# Patient Record
Sex: MALE | Race: WHITE | NOT HISPANIC OR LATINO | Employment: OTHER | URBAN - METROPOLITAN AREA
[De-identification: names, ages, dates, MRNs, and addresses within clinical notes are randomized per-mention and may not be internally consistent; named-entity substitution may affect disease eponyms.]

---

## 2022-01-15 ENCOUNTER — APPOINTMENT (OUTPATIENT)
Dept: CT IMAGING | Facility: HOSPITAL | Age: 67
End: 2022-01-15

## 2022-01-15 ENCOUNTER — HOSPITAL ENCOUNTER (EMERGENCY)
Facility: HOSPITAL | Age: 67
Discharge: HOME | End: 2022-01-16
Attending: EMERGENCY MEDICINE | Admitting: EMERGENCY MEDICINE

## 2022-01-15 DIAGNOSIS — R73.9 HYPERGLYCEMIA: ICD-10-CM

## 2022-01-15 DIAGNOSIS — R10.33 PERIUMBILICAL ABDOMINAL PAIN: Primary | ICD-10-CM

## 2022-01-15 LAB
ACETONE BLD QL: NEGATIVE
ALBUMIN SERPL-MCNC: 4.1 G/DL (ref 3.5–5.2)
ALBUMIN/GLOB SERPL: 0.9 G/DL
ALP SERPL-CCNC: 133 U/L (ref 39–117)
ALT SERPL W P-5'-P-CCNC: 49 U/L (ref 1–41)
ANION GAP SERPL CALCULATED.3IONS-SCNC: 13.9 MMOL/L (ref 5–15)
AST SERPL-CCNC: 62 U/L (ref 1–40)
BASOPHILS # BLD AUTO: 0.05 10*3/MM3 (ref 0–0.2)
BASOPHILS NFR BLD AUTO: 0.5 % (ref 0–1.5)
BILIRUB SERPL-MCNC: 0.6 MG/DL (ref 0–1.2)
BILIRUB UR QL STRIP: NEGATIVE
BUN SERPL-MCNC: 27 MG/DL (ref 8–23)
BUN/CREAT SERPL: 18.9 (ref 7–25)
CALCIUM SPEC-SCNC: 9.6 MG/DL (ref 8.6–10.5)
CHLORIDE SERPL-SCNC: 94 MMOL/L (ref 98–107)
CLARITY UR: CLEAR
CO2 SERPL-SCNC: 28.1 MMOL/L (ref 22–29)
COLOR UR: YELLOW
CREAT SERPL-MCNC: 1.43 MG/DL (ref 0.76–1.27)
DEPRECATED RDW RBC AUTO: 44.5 FL (ref 37–54)
EOSINOPHIL # BLD AUTO: 0.24 10*3/MM3 (ref 0–0.4)
EOSINOPHIL NFR BLD AUTO: 2.6 % (ref 0.3–6.2)
ERYTHROCYTE [DISTWIDTH] IN BLOOD BY AUTOMATED COUNT: 13.2 % (ref 12.3–15.4)
GFR SERPL CREATININE-BSD FRML MDRD: 50 ML/MIN/1.73
GLOBULIN UR ELPH-MCNC: 4.6 GM/DL
GLUCOSE BLDC GLUCOMTR-MCNC: 340 MG/DL (ref 70–99)
GLUCOSE SERPL-MCNC: 369 MG/DL (ref 65–99)
GLUCOSE UR STRIP-MCNC: ABNORMAL MG/DL
HCT VFR BLD AUTO: 37.7 % (ref 37.5–51)
HGB BLD-MCNC: 12.4 G/DL (ref 13–17.7)
HGB UR QL STRIP.AUTO: NEGATIVE
HOLD SPECIMEN: NORMAL
HOLD SPECIMEN: NORMAL
IMM GRANULOCYTES # BLD AUTO: 0.09 10*3/MM3 (ref 0–0.05)
IMM GRANULOCYTES NFR BLD AUTO: 1 % (ref 0–0.5)
KETONES UR QL STRIP: NEGATIVE
LEUKOCYTE ESTERASE UR QL STRIP.AUTO: NEGATIVE
LIPASE SERPL-CCNC: 43 U/L (ref 13–60)
LYMPHOCYTES # BLD AUTO: 2.88 10*3/MM3 (ref 0.7–3.1)
LYMPHOCYTES NFR BLD AUTO: 31.3 % (ref 19.6–45.3)
MCH RBC QN AUTO: 30.3 PG (ref 26.6–33)
MCHC RBC AUTO-ENTMCNC: 32.9 G/DL (ref 31.5–35.7)
MCV RBC AUTO: 92.2 FL (ref 79–97)
MONOCYTES # BLD AUTO: 0.71 10*3/MM3 (ref 0.1–0.9)
MONOCYTES NFR BLD AUTO: 7.7 % (ref 5–12)
NEUTROPHILS NFR BLD AUTO: 5.22 10*3/MM3 (ref 1.7–7)
NEUTROPHILS NFR BLD AUTO: 56.9 % (ref 42.7–76)
NITRITE UR QL STRIP: NEGATIVE
NRBC BLD AUTO-RTO: 0 /100 WBC (ref 0–0.2)
PH UR STRIP.AUTO: <=5 [PH] (ref 5–8)
PLATELET # BLD AUTO: 270 10*3/MM3 (ref 140–450)
PMV BLD AUTO: 10.8 FL (ref 6–12)
POTASSIUM SERPL-SCNC: 4.1 MMOL/L (ref 3.5–5.2)
PROT SERPL-MCNC: 8.7 G/DL (ref 6–8.5)
PROT UR QL STRIP: NEGATIVE
RBC # BLD AUTO: 4.09 10*6/MM3 (ref 4.14–5.8)
SODIUM SERPL-SCNC: 136 MMOL/L (ref 136–145)
SP GR UR STRIP: 1.01 (ref 1–1.03)
TROPONIN T SERPL-MCNC: <0.01 NG/ML (ref 0–0.03)
UROBILINOGEN UR QL STRIP: ABNORMAL
WBC NRBC COR # BLD: 9.19 10*3/MM3 (ref 3.4–10.8)
WHOLE BLOOD HOLD SPECIMEN: NORMAL
WHOLE BLOOD HOLD SPECIMEN: NORMAL

## 2022-01-15 PROCEDURE — 36415 COLL VENOUS BLD VENIPUNCTURE: CPT

## 2022-01-15 PROCEDURE — 84484 ASSAY OF TROPONIN QUANT: CPT | Performed by: NURSE PRACTITIONER

## 2022-01-15 PROCEDURE — 74177 CT ABD & PELVIS W/CONTRAST: CPT

## 2022-01-15 PROCEDURE — 80053 COMPREHEN METABOLIC PANEL: CPT

## 2022-01-15 PROCEDURE — 93010 ELECTROCARDIOGRAM REPORT: CPT | Performed by: INTERNAL MEDICINE

## 2022-01-15 PROCEDURE — 0 IOPAMIDOL PER 1 ML: Performed by: EMERGENCY MEDICINE

## 2022-01-15 PROCEDURE — 82009 KETONE BODYS QUAL: CPT | Performed by: NURSE PRACTITIONER

## 2022-01-15 PROCEDURE — 82962 GLUCOSE BLOOD TEST: CPT

## 2022-01-15 PROCEDURE — 81003 URINALYSIS AUTO W/O SCOPE: CPT

## 2022-01-15 PROCEDURE — 83690 ASSAY OF LIPASE: CPT

## 2022-01-15 PROCEDURE — 96360 HYDRATION IV INFUSION INIT: CPT

## 2022-01-15 PROCEDURE — 99283 EMERGENCY DEPT VISIT LOW MDM: CPT

## 2022-01-15 PROCEDURE — 93005 ELECTROCARDIOGRAM TRACING: CPT | Performed by: NURSE PRACTITIONER

## 2022-01-15 PROCEDURE — 85025 COMPLETE CBC W/AUTO DIFF WBC: CPT

## 2022-01-15 RX ORDER — SODIUM CHLORIDE 0.9 % (FLUSH) 0.9 %
10 SYRINGE (ML) INJECTION AS NEEDED
Status: DISCONTINUED | OUTPATIENT
Start: 2022-01-15 | End: 2022-01-16 | Stop reason: HOSPADM

## 2022-01-15 RX ADMIN — IOPAMIDOL 100 ML: 755 INJECTION, SOLUTION INTRAVENOUS at 22:43

## 2022-01-15 RX ADMIN — SODIUM CHLORIDE 1000 ML: 9 INJECTION, SOLUTION INTRAVENOUS at 22:30

## 2022-01-16 VITALS
RESPIRATION RATE: 18 BRPM | TEMPERATURE: 98.6 F | BODY MASS INDEX: 46.65 KG/M2 | HEART RATE: 69 BPM | HEIGHT: 69 IN | OXYGEN SATURATION: 97 % | SYSTOLIC BLOOD PRESSURE: 138 MMHG | WEIGHT: 315 LBS | DIASTOLIC BLOOD PRESSURE: 76 MMHG

## 2022-01-16 LAB — GLUCOSE BLDC GLUCOMTR-MCNC: 189 MG/DL (ref 70–99)

## 2022-01-16 PROCEDURE — 63710000001 INSULIN REGULAR HUMAN PER 5 UNITS: Performed by: NURSE PRACTITIONER

## 2022-01-16 PROCEDURE — 82962 GLUCOSE BLOOD TEST: CPT

## 2022-01-16 RX ORDER — DICYCLOMINE HCL 20 MG
20 TABLET ORAL EVERY 6 HOURS
Qty: 20 TABLET | Refills: 0 | Status: SHIPPED | OUTPATIENT
Start: 2022-01-16

## 2022-01-16 RX ORDER — DICYCLOMINE HCL 20 MG
20 TABLET ORAL EVERY 6 HOURS
Qty: 20 TABLET | Refills: 0 | Status: SHIPPED | OUTPATIENT
Start: 2022-01-16 | End: 2022-01-16 | Stop reason: SDUPTHER

## 2022-01-16 RX ADMIN — INSULIN HUMAN 4 UNITS: 100 INJECTION, SOLUTION PARENTERAL at 00:55

## 2022-01-16 NOTE — ED TRIAGE NOTES
Patient  States he was treated last night at the [facility] for an elevated blood sugar, patient states he was treated for his high blood sugar but the abdominal pain was untreated. Patient states he has been having this abdominal pain since he received the remdesiver infusion. Patient states his labs were abnormal on his 4th day so he did not complete the infusion,. Patient states he was home today and his sugar remains elevated. Patient states he is also sill positive with covid. Patient states over the last couple of weeks his stomach has been distended.

## 2022-01-16 NOTE — ED PROVIDER NOTES
"Altagracia Liu is a 66-year-old male that presents to the emergency department today for complaints of abdominal pain and hyperglycemia for the last couple of days.  [facility] and [facility] his [facility] but not his abdominal pain.  He reports distention of his abdomen and generalized pain that he rates a 3 out of 10.  He denies any nausea, vomiting, diarrhea, fever, testicular pain or any other complaints with this.  He reports he took remdesivir when he had COVID and is concerned this may be causing his abdominal pain?          Review of Systems   Gastrointestinal: Positive for abdominal pain.   All other systems reviewed and are negative.      Past Medical History:   Diagnosis Date   • COPD (chronic obstructive pulmonary disease) (HCC)    • Diabetes mellitus (HCC)    • Hypertension        Allergies   Allergen Reactions   • Morphine Shortness Of Breath     \"stops breathing\"       Past Surgical History:   Procedure Laterality Date   • CARPAL TUNNEL RELEASE     • EYE SURGERY     • JOINT REPLACEMENT         History reviewed. No pertinent family history.    Social History     Socioeconomic History   • Marital status:    Tobacco Use   • Smoking status: Former Smoker   Substance and Sexual Activity   • Alcohol use: Yes     Comment: social   • Drug use: Never           Objective   Physical Exam  Vitals and nursing note reviewed.   Constitutional:       General: He is not in acute distress.     Appearance: He is well-developed. He is obese. He is not ill-appearing, toxic-appearing or diaphoretic.   Cardiovascular:      Rate and Rhythm: Normal rate and regular rhythm.      Heart sounds: Normal heart sounds.   Pulmonary:      Effort: Pulmonary effort is normal.      Breath sounds: Normal breath sounds.   Abdominal:      General: Bowel sounds are normal. There is distension.      Palpations: Abdomen is soft.      Tenderness: There is abdominal tenderness in the epigastric " area and periumbilical area.   Skin:     General: Skin is warm and dry.      Capillary Refill: Capillary refill takes less than 2 seconds.   Neurological:      Mental Status: He is alert.   Psychiatric:         Mood and Affect: Mood normal.         Behavior: Behavior normal.         Procedures           ED Course                                                 MDM  Number of Diagnoses or Management Options  Diagnosis management comments: Seen and assessed patient as noted.  Vital stable, no acute distress, afebrile.      Differentials include but are not limited to: DKA, hyperglycemia, other abdominal etiology.    Labs and imaging ordered.  CT abdomen pelvis shows no acute findings.  All of patient's labs showed no acute findings.  No signs of DKA today, but patient's blood sugar is 369.  Treating with IV insulin and will reevaluate.    I feel patient's abdominal pain may be related to a viral syndrome or his uncontrolled sugars.  Patient reports he was a controlled diabetic on metformin 875 mg twice a day with his sugars running in the 190s.  However, he reports recently that his sugars have been running in the 400s.  He denies any weight gain or change in diet.    Once sugar is corrected tonight and less than 300, will discharge patient home with a prescription for metformin 1000 mg twice a day and instructions to monitor his sugar 3 times a day until he can see with his PCP on Monday or Tuesday to see if this regimen is working.  I also prescribed Bentyl for his abdominal pain as needed.  I feel patient is safe to discharge home at this time as he has been taking without complication, resting, no acute distress.  I educated the patient and wife on worrisome symptoms to follow-up for and they verbalized understanding.         Amount and/or Complexity of Data Reviewed  Clinical lab tests: reviewed and ordered  Tests in the radiology section of CPT®: reviewed and ordered  Tests in the medicine section of CPT®:  reviewed  Decide to obtain previous medical records or to obtain history from someone other than the patient: yes    Risk of Complications, Morbidity, and/or Mortality  Presenting problems: moderate  Diagnostic procedures: moderate  Management options: moderate    Patient Progress  Patient progress: stable      Final diagnoses:   Periumbilical abdominal pain   Hyperglycemia       ED Disposition  ED Disposition     ED Disposition Condition Comment    Discharge Stable           [facility] EMERGENCY ROOM  [address] [facility] [address] [phone]  Go to   If symptoms worsen    Nic Anderson,   [address] [address]  [phone]    Go on 1/18/2022  for blood sugar follow up         Medication List      No changes were made to your prescriptions during this visit.          Berenice Maria APRN  01/16/22 0113       Berenice Maria, WESLEY  01/16/22 0113

## 2022-01-16 NOTE — DISCHARGE INSTRUCTIONS
You were seen in the ER tonight for abdominal pain and hyperglycemia.  Your CT abdomen pelvis showed no acute findings.  Your labs showed no emergent findings other than your blood sugar was 369 which we treated here.    I feel your abdominal pain is related to your uncontrolled sugars.  Please start taking metformin 1000 mg twice a day and keeping a blood sugar log 3 times a day until he can follow-up with your PCP on Tuesday as discussed.  Return to ER if you worsen (increased fatigue/weakness, confusion).  Take Bentyl as needed.

## 2022-01-24 LAB — QT INTERVAL: 402 MS

## 2022-12-12 ENCOUNTER — HOSPITAL ENCOUNTER (EMERGENCY)
Facility: HOSPITAL | Age: 67
Discharge: HOME | End: 2022-12-12
Attending: EMERGENCY MEDICINE | Admitting: EMERGENCY MEDICINE

## 2022-12-12 VITALS
RESPIRATION RATE: 18 BRPM | HEART RATE: 73 BPM | BODY MASS INDEX: 43.89 KG/M2 | WEIGHT: 296.3 LBS | SYSTOLIC BLOOD PRESSURE: 140 MMHG | OXYGEN SATURATION: 98 % | DIASTOLIC BLOOD PRESSURE: 86 MMHG | TEMPERATURE: 98.1 F | HEIGHT: 69 IN

## 2022-12-12 DIAGNOSIS — M10.9 ACUTE GOUT, UNSPECIFIED CAUSE, UNSPECIFIED SITE: Primary | ICD-10-CM

## 2022-12-12 DIAGNOSIS — M79.674 PAIN OF TOE OF RIGHT FOOT: ICD-10-CM

## 2022-12-12 PROCEDURE — 96372 THER/PROPH/DIAG INJ SC/IM: CPT

## 2022-12-12 PROCEDURE — 99282 EMERGENCY DEPT VISIT SF MDM: CPT

## 2022-12-12 PROCEDURE — 25010000002 KETOROLAC TROMETHAMINE PER 15 MG

## 2022-12-12 RX ORDER — FUROSEMIDE 40 MG/1
TABLET ORAL
COMMUNITY

## 2022-12-12 RX ORDER — COLCHICINE 0.6 MG/1
TABLET ORAL
Qty: 4 TABLET | Refills: 0 | Status: SHIPPED | OUTPATIENT
Start: 2022-12-12 | End: 2023-01-13

## 2022-12-12 RX ORDER — GABAPENTIN 300 MG/1
CAPSULE ORAL
COMMUNITY
End: 2023-01-13

## 2022-12-12 RX ORDER — ALLOPURINOL 300 MG/1
TABLET ORAL
COMMUNITY

## 2022-12-12 RX ORDER — LEVOTHYROXINE SODIUM 0.2 MG/1
TABLET ORAL
COMMUNITY

## 2022-12-12 RX ORDER — KETOROLAC TROMETHAMINE 30 MG/ML
60 INJECTION, SOLUTION INTRAMUSCULAR; INTRAVENOUS ONCE
Status: COMPLETED | OUTPATIENT
Start: 2022-12-12 | End: 2022-12-12

## 2022-12-12 RX ADMIN — KETOROLAC TROMETHAMINE 60 MG: 60 INJECTION, SOLUTION INTRAMUSCULAR at 13:20

## 2022-12-12 NOTE — DISCHARGE INSTRUCTIONS
Please take the medication prescribed you today as directed.  Once you have finished that medication if you continue to have pain please follow-up with your primary care provider regarding additional medications.    For the time.  In between finishing the new medication and seeing your physician, you may take anti-inflammatory such as Motrin, naproxen, or Advil.  You have not been given steroids due to the fact that you are diabetic and starting a new diabetic regiment and the steroids could possibly increase your blood glucose and interfere with your new medication.  Please follow a low purine diet.  Information regarding that diet has been provided for you.  Return to the ER if you develop worsening of pain, fever that cannot be controlled with Tylenol or Motrin, severe nausea, vomiting, or diarrhea, or feel as if you have lost feeling to your foot.
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
## 2023-01-13 ENCOUNTER — OFFICE VISIT (OUTPATIENT)
Dept: PODIATRY | Facility: CLINIC | Age: 68
End: 2023-01-13
Payer: MEDICARE

## 2023-01-13 VITALS
OXYGEN SATURATION: 98 % | HEIGHT: 69 IN | DIASTOLIC BLOOD PRESSURE: 89 MMHG | BODY MASS INDEX: 43.84 KG/M2 | HEART RATE: 72 BPM | SYSTOLIC BLOOD PRESSURE: 160 MMHG | TEMPERATURE: 96.9 F | WEIGHT: 296 LBS

## 2023-01-13 DIAGNOSIS — E11.65 TYPE 2 DIABETES MELLITUS WITH HYPERGLYCEMIA, UNSPECIFIED WHETHER LONG TERM INSULIN USE: ICD-10-CM

## 2023-01-13 DIAGNOSIS — M79.672 PAIN IN BOTH FEET: ICD-10-CM

## 2023-01-13 DIAGNOSIS — M79.671 PAIN IN BOTH FEET: ICD-10-CM

## 2023-01-13 DIAGNOSIS — B35.1 ONYCHOMYCOSIS: Primary | ICD-10-CM

## 2023-01-13 PROCEDURE — G8404 LOW EXTEMITY NEUR EXAM DOCUM: HCPCS | Performed by: PODIATRIST

## 2023-01-13 PROCEDURE — 99203 OFFICE O/P NEW LOW 30 MIN: CPT | Performed by: PODIATRIST

## 2023-01-13 PROCEDURE — 11721 DEBRIDE NAIL 6 OR MORE: CPT | Performed by: PODIATRIST

## 2023-01-13 RX ORDER — SEMAGLUTIDE 1.34 MG/ML
0.5 INJECTION, SOLUTION SUBCUTANEOUS WEEKLY
COMMUNITY

## 2023-01-13 RX ORDER — PAROXETINE 30 MG/1
30 TABLET, FILM COATED ORAL EVERY MORNING
COMMUNITY

## 2023-01-13 RX ORDER — ATORVASTATIN CALCIUM 80 MG/1
80 TABLET, FILM COATED ORAL DAILY
COMMUNITY

## 2023-01-13 RX ORDER — OMEPRAZOLE 40 MG/1
40 CAPSULE, DELAYED RELEASE ORAL DAILY
COMMUNITY

## 2023-01-13 RX ORDER — TRAMADOL HYDROCHLORIDE 50 MG/1
TABLET ORAL
COMMUNITY

## 2023-01-13 RX ORDER — HYDROXYZINE HYDROCHLORIDE 10 MG/1
10 TABLET, FILM COATED ORAL 3 TIMES DAILY PRN
COMMUNITY

## 2023-01-13 RX ORDER — LOSARTAN POTASSIUM 50 MG/1
50 TABLET ORAL DAILY
COMMUNITY

## 2023-01-13 NOTE — PROGRESS NOTES
[facility] - PODIATRY    Today's Date: 01/13/23    Patient Name: Raymond M Jr Damico  MRN: [id number]  CSN: [id number]  PCP: Nic Anderson DO,Referring Provider: No ref. provider found    SUBJECTIVE     Chief Complaint   Patient presents with   • Left Foot - Establish Care, Diabetes, Annual Exam   • Right Foot - Diabetes, Annual Exam, Establish Care     HPI: Raymond M Jr Damico, a 67 y.o.male, presents to clinic for a diabetic foot evaluation.    Patient is diabetic male with an A1c of around 9.  [facility]  He is here for bilateral foot issues.  Patient states his toes are painful in shoe gear and his nails are painful to touch.    Patient denies any fevers, chills, nausea, vomiting, shortness of breath, nor any other constitutional signs nor symptoms.    No other pedal complaints at this time.    Past Medical History:   Diagnosis Date   • Callus    • COPD (chronic obstructive pulmonary disease) (HCC)    • Diabetes mellitus (HCC)    • Gout    • Hypertension      Past Surgical History:   Procedure Laterality Date   • CARPAL TUNNEL RELEASE     • EYE SURGERY     • JOINT REPLACEMENT       Family History   Problem Relation Age of Onset   • Cancer Father    • Diabetes Father    • Heart disease Neg Hx    • Hypertension Neg Hx    • Thyroid disease Neg Hx      Social History     Socioeconomic History   • Marital status:    Tobacco Use   • Smoking status: Former     Packs/day: 1.00     Years: 40.00     Pack years: 40.00     Types: Cigarettes, Cigars     Quit date: 12/19/2012     Years since quitting: 10.0   • Tobacco comments:     Never should have started   Vaping Use   • Vaping Use: Never used   Substance and Sexual Activity   • Alcohol use: Yes     Alcohol/week: 3.0 standard drinks     Types: 3 Shots of liquor per week     Comment: social   • Drug use: Yes     Frequency: 2.0 times per week     Types: Marijuana   • Sexual activity: Yes     Partners: Female     Birth  "control/protection: None     Allergies   Allergen Reactions   • Morphine Shortness Of Breath     \"stops breathing\"     Current Outpatient Medications   Medication Sig Dispense Refill   • allopurinol (ZYLOPRIM) 300 MG tablet allopurinol oral tablet 300 mg take 1 tablet (300 mg) by oral route once daily   Active     • atorvastatin (LIPITOR) 80 MG tablet Take 80 mg by mouth Daily.     • Cyanocobalamin (VITAMIN B 12 PO) Take  by mouth.     • empagliflozin (JARDIANCE) 25 MG tablet tablet Take  by mouth Daily.     • furosemide (LASIX) 40 MG tablet Lasix oral tablet 40 mg take 1 tablet (40 mg) by oral route once daily   Active     • hydrOXYzine (ATARAX) 10 MG tablet Take 10 mg by mouth 3 (Three) Times a Day As Needed for Itching.     • levothyroxine (SYNTHROID, LEVOTHROID) 200 MCG tablet levothyroxine oral tablet 200 mcg take 1 tablet (200 mcg) by oral route once daily   Active     • losartan (COZAAR) 50 MG tablet Take 50 mg by mouth Daily.     • metFORMIN (GLUCOPHAGE) 500 MG tablet Take 2 tablets by mouth 2 (Two) Times a Day With Meals. 60 tablet 0   • omeprazole (priLOSEC) 40 MG capsule Take 40 mg by mouth Daily.     • PARoxetine (PAXIL) 30 MG tablet Take 30 mg by mouth Every Morning.     • Semaglutide,0.25 or 0.5MG/DOS, (Ozempic, 0.25 or 0.5 MG/DOSE,) 2 MG/1.5ML solution pen-injector Inject 0.5 mg under the skin into the appropriate area as directed 1 (One) Time Per Week. Takes once a week.     • traMADol (ULTRAM) 50 MG tablet tramadol oral tablet 50 mg take 1 tablet (50 mg) by oral route every twice daily as needed   Active     • dicyclomine (BENTYL) 20 MG tablet Take 1 tablet by mouth Every 6 (Six) Hours. 20 tablet 0     No current facility-administered medications for this visit.     Review of Systems   Musculoskeletal:        Bilateral foot pain       OBJECTIVE     Vitals:    01/13/23 0958   BP: 160/89   Pulse: 72   Temp: 96.9 °F (36.1 °C)   SpO2: 98%       Body mass index is 43.69 kg/m².    No results found for: " HGBA1C    Lab Results   Component Value Date    GLUCOSE 369 (H) 01/15/2022    CALCIUM 9.6 01/15/2022     01/15/2022    K 4.1 01/15/2022    CO2 28.1 01/15/2022    CL 94 (L) 01/15/2022    BUN 27 (H) 01/15/2022    CREATININE 1.43 (H) 01/15/2022    EGFRIFNONA 50 (L) 01/15/2022    BCR 18.9 01/15/2022    ANIONGAP 13.9 01/15/2022       Patient seen in no apparent distress.      PHYSICAL EXAM:     Foot/Ankle Exam:       General:   Appearance: appears stated age and healthy    Orientation: AAOx3    Affect: appropriate    Gait: unimpaired    Shoe Gear:  Casual shoes    VASCULAR      Right Foot Vascularity   Normal vascular exam    Dorsalis pedis:  2+  Posterior tibial:  2+  Skin Temperature: warm    Edema Grading:  None  CFT:  < 3 seconds  Pedal Hair Growth:  Present  Varicosities: none       Left Foot Vascularity   Normal vascular exam    Dorsalis pedis:  2+  Posterior tibial:  2+  Skin Temperature: warm    Edema Grading:  None  CFT:  < 3 seconds  Pedal Hair Growth:  Present  Varicosities: none        NEUROLOGIC     Right Foot Neurologic   Normal sensation    Light touch sensation:  Normal  Vibratory sensation:  Normal  Hot/Cold sensation: normal    Protective Sensation using Tuttle-Ari Monofilament:  10     Left Foot Neurologic   Normal sensation    Light touch sensation:  Normal  Vibratory sensation:  Normal  Hot/cold sensation: normal    Protective Sensation using Tuttle-Ari Monofilament:  10     MUSCLE STRENGTH     Right Foot Muscle Strength   Foot dorsiflexion:  4  Foot plantar flexion:  4  Foot inversion:  4  Foot eversion:  4     Left Foot Muscle Strength   Foot dorsiflexion:  4  Foot plantar flexion:  4  Foot inversion:  4  Foot eversion:  4     RANGE OF MOTION      Right Foot Range of Motion   Foot and ankle ROM within normal limits       Left Foot Range of Motion   Foot and ankle ROM within normal limits       DERMATOLOGIC     Right Foot Dermatologic   Skin: skin intact    Nails: onychomycosis,  abnormally thick, subungual debris, dystrophic nails and ingrown toenail    Nails comment:  Toenails 1, 2, 3, 4, and 5     Left Foot Dermatologic   Skin: skin intact    Nails: onychomycosis, abnormally thick, subungual debris, dystrophic nails and ingrown toenail    Nails comment:  Toenails 1, 2, 3, 4, and 5        ASSESSMENT/PLAN     Diagnoses and all orders for this visit:    1. Onychomycosis (Primary)    2. Pain in both feet    3. Type 2 diabetes mellitus with hyperglycemia, unspecified whether long term insulin use (HCC)       Toenails 1, 2, 3, 4, 5 on Right and 1, 2, 3, 4, 5 on Left were debrided with nail nippers then filed with a Navneetmel nail jon.  Patient tolerated procedure well without complications.  Comprehensive lower extremity examination and evaluation was performed.    Discussed findings and treatment plan including risks, benefits, and treatment options with patient in detail. Patient agreed with treatment plan.    Medications and allergies reviewed.  Reviewed available blood glucose and HgB A1C lab values along with other pertinent labs.  These were discussed with the patient as to their importance of diabetic maintenance.    Diabetic foot exam performed and documented this date, compliant with CQM required standards. Detail of findings as noted in physical exam.  Lower extremity Neurologic exam for diabetic patient performed and documented this date, compliant with PQRS required standards. Detail of findings as noted in physical exam.  Advised patient importance of good routine lower extremity hygiene. Advised patient importance of evaluating for intact skin and pain free nail borders.  Advised patient to use mirror to evaluate plantar/ soles of feet for better visualization. Advised patient monitor and phone office to be seen if any cracking to skin, open lesions, painful nail borders or if nails become elongated prior to next visit. Advised patient importance of daily cleansing of lower  extremities, followed by good skin cream to maintain normal hydration of skin. Also advised patient importance of close daily monitoring of blood sugar. Advised to regulate diet and medications to maintain control of blood sugar in optimal range. Contact primary care provider if difficulties maintaining blood sugar levels.  Advised Patient of presence of Diabetes Mellitus condition.  Advised Patient risk of progression and worsening or improvement, then return of condition.  Will monitor condition for any change in future. Treat with most appropriate treatment pending status of condition.  Counseled and advised patient extensively on nature and ramifications of diabetes. Standard instructions given to patient for good diabetic foot care and maintenance. Advised importance of careful monitoring to avoid break down and complications secondary to diabetes. Advised patient importance of strict maintenance of blood sugar control. Advised patient of possible ominous results from neglect of condition, i.e.: amputation/ loss of digits, feet and legs, or even death.  Patient states understands counseling, will monitor closely, continue good hygiene and routine diabetic foot care. Patient will contact office is questions or problems.      An After Visit Summary was printed and given to the patient at discharge, including (if requested) any available informative/educational handouts regarding diagnosis, treatment, or medications. All questions were answered to patient/family satisfaction. Should symptoms fail to improve or worsen they agree to call or return to clinic or to go to the Emergency Department. Discussed the importance of following up with any needed screening tests/labs/specialist appointments and any requested follow-up recommended by me today. Importance of maintaining follow-up discussed and patient accepts that missed appointments can delay diagnosis and potentially lead to worsening of conditions.    Return in  about 3 months (around 4/13/2023)., or sooner if acute issues arise.    This document has been electronically signed by Lg Robles DPM on January 13, 2023 10:51 EST

## 2023-04-18 ENCOUNTER — HOSPITAL ENCOUNTER (EMERGENCY)
Facility: HOSPITAL | Age: 68
Discharge: HOME | End: 2023-04-18
Attending: EMERGENCY MEDICINE | Admitting: EMERGENCY MEDICINE
Payer: OTHER GOVERNMENT

## 2023-04-18 ENCOUNTER — APPOINTMENT (OUTPATIENT)
Dept: CT IMAGING | Facility: HOSPITAL | Age: 68
End: 2023-04-18
Payer: OTHER GOVERNMENT

## 2023-04-18 ENCOUNTER — APPOINTMENT (OUTPATIENT)
Dept: GENERAL RADIOLOGY | Facility: HOSPITAL | Age: 68
End: 2023-04-18
Payer: OTHER GOVERNMENT

## 2023-04-18 VITALS
RESPIRATION RATE: 18 BRPM | OXYGEN SATURATION: 95 % | HEIGHT: 69 IN | TEMPERATURE: 98.6 F | DIASTOLIC BLOOD PRESSURE: 88 MMHG | SYSTOLIC BLOOD PRESSURE: 154 MMHG | HEART RATE: 85 BPM | BODY MASS INDEX: 43.27 KG/M2 | WEIGHT: 292.11 LBS

## 2023-04-18 DIAGNOSIS — J20.9 ACUTE BRONCHITIS, UNSPECIFIED ORGANISM: Primary | ICD-10-CM

## 2023-04-18 LAB
ALBUMIN SERPL-MCNC: 4 G/DL (ref 3.5–5.2)
ALBUMIN/GLOB SERPL: 0.8 G/DL
ALP SERPL-CCNC: 94 U/L (ref 39–117)
ALT SERPL W P-5'-P-CCNC: 18 U/L (ref 1–41)
ANION GAP SERPL CALCULATED.3IONS-SCNC: 14.1 MMOL/L (ref 5–15)
AST SERPL-CCNC: 20 U/L (ref 1–40)
BASOPHILS # BLD AUTO: 0.03 10*3/MM3 (ref 0–0.2)
BASOPHILS NFR BLD AUTO: 0.4 % (ref 0–1.5)
BILIRUB SERPL-MCNC: 0.4 MG/DL (ref 0–1.2)
BUN SERPL-MCNC: 18 MG/DL (ref 8–23)
BUN/CREAT SERPL: 17.1 (ref 7–25)
CALCIUM SPEC-SCNC: 9.1 MG/DL (ref 8.6–10.5)
CHLORIDE SERPL-SCNC: 98 MMOL/L (ref 98–107)
CO2 SERPL-SCNC: 25.9 MMOL/L (ref 22–29)
CREAT SERPL-MCNC: 1.05 MG/DL (ref 0.76–1.27)
DEPRECATED RDW RBC AUTO: 41 FL (ref 37–54)
EGFRCR SERPLBLD CKD-EPI 2021: 78.3 ML/MIN/1.73
EOSINOPHIL # BLD AUTO: 0.3 10*3/MM3 (ref 0–0.4)
EOSINOPHIL NFR BLD AUTO: 3.7 % (ref 0.3–6.2)
ERYTHROCYTE [DISTWIDTH] IN BLOOD BY AUTOMATED COUNT: 12.5 % (ref 12.3–15.4)
FLUAV AG NPH QL: NEGATIVE
FLUBV AG NPH QL IA: NEGATIVE
GLOBULIN UR ELPH-MCNC: 4.8 GM/DL
GLUCOSE SERPL-MCNC: 165 MG/DL (ref 65–99)
HCT VFR BLD AUTO: 39 % (ref 37.5–51)
HGB BLD-MCNC: 13 G/DL (ref 13–17.7)
HOLD SPECIMEN: NORMAL
HOLD SPECIMEN: NORMAL
IMM GRANULOCYTES # BLD AUTO: 0.06 10*3/MM3 (ref 0–0.05)
IMM GRANULOCYTES NFR BLD AUTO: 0.7 % (ref 0–0.5)
LYMPHOCYTES # BLD AUTO: 2.76 10*3/MM3 (ref 0.7–3.1)
LYMPHOCYTES NFR BLD AUTO: 34 % (ref 19.6–45.3)
MCH RBC QN AUTO: 29.7 PG (ref 26.6–33)
MCHC RBC AUTO-ENTMCNC: 33.3 G/DL (ref 31.5–35.7)
MCV RBC AUTO: 89 FL (ref 79–97)
MONOCYTES # BLD AUTO: 0.75 10*3/MM3 (ref 0.1–0.9)
MONOCYTES NFR BLD AUTO: 9.2 % (ref 5–12)
NEUTROPHILS NFR BLD AUTO: 4.22 10*3/MM3 (ref 1.7–7)
NEUTROPHILS NFR BLD AUTO: 52 % (ref 42.7–76)
NRBC BLD AUTO-RTO: 0 /100 WBC (ref 0–0.2)
NT-PROBNP SERPL-MCNC: 90.5 PG/ML (ref 0–900)
PLATELET # BLD AUTO: 277 10*3/MM3 (ref 140–450)
PMV BLD AUTO: 9.5 FL (ref 6–12)
POTASSIUM SERPL-SCNC: 3.8 MMOL/L (ref 3.5–5.2)
PROT SERPL-MCNC: 8.8 G/DL (ref 6–8.5)
RBC # BLD AUTO: 4.38 10*6/MM3 (ref 4.14–5.8)
S PYO AG THROAT QL: NEGATIVE
SARS-COV-2 RNA RESP QL NAA+PROBE: NOT DETECTED
SODIUM SERPL-SCNC: 138 MMOL/L (ref 136–145)
TROPONIN T SERPL HS-MCNC: 21 NG/L
WBC NRBC COR # BLD: 8.12 10*3/MM3 (ref 3.4–10.8)
WHOLE BLOOD HOLD COAG: NORMAL
WHOLE BLOOD HOLD SPECIMEN: NORMAL

## 2023-04-18 PROCEDURE — 71045 X-RAY EXAM CHEST 1 VIEW: CPT

## 2023-04-18 PROCEDURE — 87804 INFLUENZA ASSAY W/OPTIC: CPT | Performed by: EMERGENCY MEDICINE

## 2023-04-18 PROCEDURE — 85025 COMPLETE CBC W/AUTO DIFF WBC: CPT | Performed by: EMERGENCY MEDICINE

## 2023-04-18 PROCEDURE — 93005 ELECTROCARDIOGRAM TRACING: CPT | Performed by: EMERGENCY MEDICINE

## 2023-04-18 PROCEDURE — 87081 CULTURE SCREEN ONLY: CPT | Performed by: EMERGENCY MEDICINE

## 2023-04-18 PROCEDURE — 36415 COLL VENOUS BLD VENIPUNCTURE: CPT

## 2023-04-18 PROCEDURE — 84484 ASSAY OF TROPONIN QUANT: CPT | Performed by: EMERGENCY MEDICINE

## 2023-04-18 PROCEDURE — 71250 CT THORAX DX C-: CPT

## 2023-04-18 PROCEDURE — C9803 HOPD COVID-19 SPEC COLLECT: HCPCS | Performed by: EMERGENCY MEDICINE

## 2023-04-18 PROCEDURE — 87880 STREP A ASSAY W/OPTIC: CPT | Performed by: EMERGENCY MEDICINE

## 2023-04-18 PROCEDURE — 87040 BLOOD CULTURE FOR BACTERIA: CPT | Performed by: EMERGENCY MEDICINE

## 2023-04-18 PROCEDURE — 80053 COMPREHEN METABOLIC PANEL: CPT | Performed by: EMERGENCY MEDICINE

## 2023-04-18 PROCEDURE — 83880 ASSAY OF NATRIURETIC PEPTIDE: CPT | Performed by: EMERGENCY MEDICINE

## 2023-04-18 PROCEDURE — U0004 COV-19 TEST NON-CDC HGH THRU: HCPCS | Performed by: EMERGENCY MEDICINE

## 2023-04-18 PROCEDURE — 99284 EMERGENCY DEPT VISIT MOD MDM: CPT

## 2023-04-18 RX ORDER — AMOXICILLIN AND CLAVULANATE POTASSIUM 875; 125 MG/1; MG/1
1 TABLET, FILM COATED ORAL ONCE
Status: COMPLETED | OUTPATIENT
Start: 2023-04-18 | End: 2023-04-18

## 2023-04-18 RX ORDER — AMOXICILLIN AND CLAVULANATE POTASSIUM 875; 125 MG/1; MG/1
1 TABLET, FILM COATED ORAL EVERY 12 HOURS
Qty: 20 TABLET | Refills: 0 | Status: SHIPPED | OUTPATIENT
Start: 2023-04-18

## 2023-04-18 RX ORDER — SODIUM CHLORIDE 0.9 % (FLUSH) 0.9 %
10 SYRINGE (ML) INJECTION AS NEEDED
Status: DISCONTINUED | OUTPATIENT
Start: 2023-04-18 | End: 2023-04-18 | Stop reason: HOSPADM

## 2023-04-18 RX ADMIN — AMOXICILLIN AND CLAVULANATE POTASSIUM 1 TABLET: 875; 125 TABLET, FILM COATED ORAL at 17:26

## 2023-04-18 NOTE — DISCHARGE INSTRUCTIONS
Rest at home.  Drink plenty of fluids.  Ensure adequate nutritional intake.  Take the antibiotics as prescribed.  Up with your primary care provider in 1 week if symptoms or not improving.  Return to the ER for any change or worsening symptoms especially increasing shortness of breath, persistent fever greater than 101, or any other concerns issues that may arise.

## 2023-04-18 NOTE — ED PROVIDER NOTES
"Time: 2:58 PM EDT  Date of encounter:  4/18/2023  Independent Historian/Clinical History and Information was obtained by: Patient and Chart  Chief Complaint: sore throat  History is limited by: N/A  Room number: 06/06     History of Present Illness:  HPI  Patient is a 67 y.o. year old male who presents to the Emergency Department via private car for evaluation of sore throat. Patient has no one at bedside on initial evaluation. Patient has a medical history of chronic obstructive pulmonary disease (COPD), diabetes mellitus (DM) and hypertension (HTN). Patient has a surgical history of no clinical significance on this case. Patient has a social history of current alcohol user, current substance (marijuana) user and former smoker.    Patient is experiencing symptoms of sore throat with chills, ear pain, cough, shortness of breath that started approximately a few days ago. Patient notes cough was initially productive with yellow sputum. Patient states they are in no pain and rates their pain level 0 out of 10 at rest and with movement or activity. Patient states no modifying factors. Patient denies symptoms fever. All other systems reviews are negative.    Patient has tried over-the-counter medication(s) with minimal relief to symptoms. Patient denies known exposure to person(s) with illness. Patient took an at-home COVID-19 test, which was negative.      Patient Care Team  Primary Care Provider: Nic Anderson DO    Past Medical History:  Allergies   Allergen Reactions   • Morphine Shortness Of Breath     \"stops breathing\"     Past Medical History:   Diagnosis Date   • Callus    • COPD (chronic obstructive pulmonary disease)    • Diabetes mellitus    • Gout    • Hypertension      Past Surgical History:   Procedure Laterality Date   • CARPAL TUNNEL RELEASE     • EYE SURGERY     • JOINT REPLACEMENT       Family History   Problem Relation Age of Onset   • Cancer Father    • Diabetes Father    • Heart disease Neg Hx    • " Hypertension Neg Hx    • Thyroid disease Neg Hx        Home Medications:  Prior to Admission medications    Medication Sig Start Date End Date Taking? Authorizing Provider   allopurinol (ZYLOPRIM) 300 MG tablet allopurinol oral tablet 300 mg take 1 tablet (300 mg) by oral route once daily   Active    Mona Barron MD   atorvastatin (LIPITOR) 80 MG tablet Take 80 mg by mouth Daily.    Mona Barron MD   Cyanocobalamin (VITAMIN B 12 PO) Take  by mouth.    Mona Barron MD   dicyclomine (BENTYL) 20 MG tablet Take 1 tablet by mouth Every 6 (Six) Hours. 1/16/22   Johnson Galaviz APRN   empagliflozin (JARDIANCE) 25 MG tablet tablet Take  by mouth Daily.    Mona Barron MD   furosemide (LASIX) 40 MG tablet Lasix oral tablet 40 mg take 1 tablet (40 mg) by oral route once daily   Active    Mona Barron MD   hydrOXYzine (ATARAX) 10 MG tablet Take 10 mg by mouth 3 (Three) Times a Day As Needed for Itching.    Mona Barron MD   levothyroxine (SYNTHROID, LEVOTHROID) 200 MCG tablet levothyroxine oral tablet 200 mcg take 1 tablet (200 mcg) by oral route once daily   Active    Mona Barron MD   losartan (COZAAR) 50 MG tablet Take 50 mg by mouth Daily.    Mona Barron MD   metFORMIN (GLUCOPHAGE) 500 MG tablet Take 2 tablets by mouth 2 (Two) Times a Day With Meals. 1/16/22   Johnson Galaviz APRN   omeprazole (priLOSEC) 40 MG capsule Take 40 mg by mouth Daily.    Mona Barron MD   PARoxetine (PAXIL) 30 MG tablet Take 30 mg by mouth Every Morning.    Mona Barron MD   Semaglutide,0.25 or 0.5MG/DOS, (Ozempic, 0.25 or 0.5 MG/DOSE,) 2 MG/1.5ML solution pen-injector Inject 0.5 mg under the skin into the appropriate area as directed 1 (One) Time Per Week. Takes once a week.    Mona Barron MD   traMADol (ULTRAM) 50 MG tablet tramadol oral tablet 50 mg take 1 tablet (50 mg) by oral route every twice daily as needed   Active     "Provider, Historical, MD        Social History:  Social History     Tobacco Use   • Smoking status: Former     Packs/day: 1.00     Years: 40.00     Pack years: 40.00     Types: Cigarettes, Cigars     Quit date: 12/19/2012     Years since quitting: 10.3   • Tobacco comments:     Never should have started   Vaping Use   • Vaping Use: Never used   Substance Use Topics   • Alcohol use: Yes     Alcohol/week: 3.0 standard drinks     Types: 3 Shots of liquor per week     Comment: social   • Drug use: Yes     Frequency: 2.0 times per week     Types: Marijuana       Review of Systems:   Review of Systems   Constitutional: Positive for chills. Negative for fever.   HENT: Positive for ear pain and sore throat.    Eyes: Negative.    Respiratory: Positive for cough (productive with yellow sputum) and shortness of breath.    Cardiovascular: Negative.    Gastrointestinal: Negative.    Endocrine: Negative.    Genitourinary: Negative.    Musculoskeletal: Negative.    Skin: Negative.    Allergic/Immunologic: Negative.    Neurological: Negative.    Hematological: Negative.    Psychiatric/Behavioral: Negative.    All other systems reviewed and are negative.         Physical Exam:   /93   Pulse 84   Temp 98.6 °F (37 °C) (Oral)   Resp 18   Ht 175.3 cm (69\")   Wt 132 kg (292 lb 1.8 oz)   SpO2 96%   BMI 43.14 kg/m²     Physical Exam  Vitals and nursing note reviewed.   Constitutional:       General: He is awake. He is not in acute distress.     Appearance: Normal appearance. He is not ill-appearing or toxic-appearing.   HENT:      Head: Normocephalic and atraumatic.      Right Ear: Hearing, tympanic membrane, ear canal and external ear normal.      Left Ear: Hearing, tympanic membrane, ear canal and external ear normal.      Nose: Nose normal. No congestion or rhinorrhea.      Mouth/Throat:      Lips: Pink.      Mouth: Mucous membranes are moist.      Pharynx: Oropharynx is clear. Uvula midline. Posterior oropharyngeal " erythema present. No pharyngeal swelling, oropharyngeal exudate or uvula swelling.   Eyes:      General: No scleral icterus.     Conjunctiva/sclera: Conjunctivae normal.   Cardiovascular:      Rate and Rhythm: Normal rate and regular rhythm.      Heart sounds: Normal heart sounds.   Pulmonary:      Effort: Pulmonary effort is normal. No respiratory distress.      Breath sounds: Examination of the right-lower field reveals decreased breath sounds. Examination of the left-lower field reveals decreased breath sounds. Decreased breath sounds present.   Musculoskeletal:      Right lower leg: No edema.      Left lower leg: No edema.   Skin:     General: Skin is warm.      Coloration: Skin is not pale.      Findings: No rash.   Neurological:      General: No focal deficit present.      Mental Status: He is alert and oriented to person, place, and time.   Psychiatric:         Behavior: Behavior normal. Behavior is cooperative.                Procedures:  Procedures    Medical Decision Making:    Comorbidities that affect care:    chronic obstructive pulmonary disease (COPD), diabetes mellitus (DM) and hypertension (HTN), former smoker, current substance user (marijuana)    External Notes reviewed:    None    The following orders were placed and all results were independently analyzed by me:  Orders Placed This Encounter   Procedures   • COVID-19,APTIMA PANTHER(RALPH),[facility]/ ZEESHAN, NP/OP SWAB IN UTM/VTM/SALINE TRANSPORT MEDIA,24 HR TAT - Swab, Nasopharynx   • Influenza Antigen, Rapid - Swab, Nasopharynx   • Rapid Strep A Screen - Swab, Throat   • Beta Strep Culture, Throat - Swab, Throat   • Blood Culture - Blood,   • Blood Culture - Blood,   • XR Chest 1 View   • CT Chest Without Contrast Diagnostic   • [place] Draw   • Comprehensive Metabolic Panel   • BNP   • Single High Sensitivity Troponin T   • CBC Auto Differential   • Urinalysis With Culture If Indicated - Urine, Clean Catch   • NPO Diet NPO Type: Strict NPO   •  Undress & Gown   • Cardiac Monitoring   • Continuous Pulse Oximetry   • Vital Signs   • Oxygen Therapy- Nasal Cannula; 2 LPM; Titrate for SPO2: equal to or greater than, 92%   • ECG 12 Lead ED Triage Standing Order; SOA   • Insert Peripheral IV   • CBC & Differential   • Green Top (Gel)   • Lavender Top   • Gold Top - SST   • Light Blue Top       Medications Given in the Emergency Department:  Medications   sodium chloride 0.9 % flush 10 mL (has no administration in time range)   amoxicillin-clavulanate (AUGMENTIN) 875-125 MG per tablet 1 tablet (1 tablet Oral Given 4/18/23 1726)       ED Course:       Labs:  Lab Results (last 24 hours)     Procedure Component Value Units Date/Time    COVID-19,APTIMA PANTHER(RALPH), JOHANNA/ ZEESHAN, NP/OP SWAB IN UTM/VTM/SALINE TRANSPORT MEDIA,24 HR TAT - Swab, Nasopharynx [[id number]] Collected: 04/18/23 1448    Specimen: Swab from Nasopharynx Updated: 04/18/23 1453    Influenza Antigen, Rapid - Swab, Nasopharynx [[id number]]  (Normal) Collected: 04/18/23 1448    Specimen: Swab from Nasopharynx Updated: 04/18/23 1519     Influenza A Ag, EIA Negative     Influenza B Ag, EIA Negative    Rapid Strep A Screen - Swab, Throat [[id number]]  (Normal) Collected: 04/18/23 1448    Specimen: Swab from Throat Updated: 04/18/23 1510     Strep A Ag Negative    Beta Strep Culture, Throat - Swab, Throat [[id number]] Collected: 04/18/23 1448    Specimen: Swab from Throat Updated: 04/18/23 1510    CBC & Differential [[id number]]  (Abnormal) Collected: 04/18/23 1522    Specimen: Blood Updated: 04/18/23 1539    Narrative:      The following orders were created for panel order CBC & Differential.  Procedure                               Abnormality         Status                     ---------                               -----------         ------                     CBC Auto Differential[[id number]]        Abnormal            Final result                 Please view results for these tests on the individual  orders.    Comprehensive Metabolic Panel [[id number]]  (Abnormal) Collected: 04/18/23 1522    Specimen: Blood Updated: 04/18/23 1607     Glucose 165 mg/dL      BUN 18 mg/dL      Creatinine 1.05 mg/dL      Sodium 138 mmol/L      Potassium 3.8 mmol/L      Chloride 98 mmol/L      CO2 25.9 mmol/L      Calcium 9.1 mg/dL      Total Protein 8.8 g/dL      Albumin 4.0 g/dL      ALT (SGPT) 18 U/L      AST (SGOT) 20 U/L      Alkaline Phosphatase 94 U/L      Total Bilirubin 0.4 mg/dL      Globulin 4.8 gm/dL      A/G Ratio 0.8 g/dL      BUN/Creatinine Ratio 17.1     Anion Gap 14.1 mmol/L      eGFR 78.3 mL/min/1.73     Narrative:      GFR Normal >60  Chronic Kidney Disease <60  Kidney Failure <15      BNP [[id number]]  (Normal) Collected: 04/18/23 1522    Specimen: Blood Updated: 04/18/23 1606     proBNP 90.5 pg/mL     Narrative:      Among patients with dyspnea, NT-proBNP is highly sensitive for the detection of acute congestive heart failure. In addition NT-proBNP of <300 pg/ml effectively rules out acute congestive heart failure with 99% negative predictive value.      Single High Sensitivity Troponin T [[id number]]  (Abnormal) Collected: 04/18/23 1522    Specimen: Blood Updated: 04/18/23 1607     HS Troponin T 21 ng/L     Narrative:      High Sensitive Troponin T Reference Range:  <10.0 ng/L- Negative Female for AMI  <15.0 ng/L- Negative Male for AMI  >=10 - Abnormal Female indicating possible myocardial injury.  >=15 - Abnormal Male indicating possible myocardial injury.   Clinicians would have to utilize clinical acumen, EKG, Troponin, and serial changes to determine if it is an Acute Myocardial Infarction or myocardial injury due to an underlying chronic condition.         CBC Auto Differential [[id number]]  (Abnormal) Collected: 04/18/23 1522    Specimen: Blood Updated: 04/18/23 1539     WBC 8.12 10*3/mm3      RBC 4.38 10*6/mm3      Hemoglobin 13.0 g/dL      Hematocrit 39.0 %      MCV 89.0 fL      MCH 29.7 pg      MCHC 33.3  g/dL      RDW 12.5 %      RDW-SD 41.0 fl      MPV 9.5 fL      Platelets 277 10*3/mm3      Neutrophil % 52.0 %      Lymphocyte % 34.0 %      Monocyte % 9.2 %      Eosinophil % 3.7 %      Basophil % 0.4 %      Immature Grans % 0.7 %      Neutrophils, Absolute 4.22 10*3/mm3      Lymphocytes, Absolute 2.76 10*3/mm3      Monocytes, Absolute 0.75 10*3/mm3      Eosinophils, Absolute 0.30 10*3/mm3      Basophils, Absolute 0.03 10*3/mm3      Immature Grans, Absolute 0.06 10*3/mm3      nRBC 0.0 /100 WBC     Blood Culture - Blood, Arm, Left [[id number]] Collected: 04/18/23 1643    Specimen: Blood from Arm, Left Updated: 04/18/23 1647    Blood Culture - Blood, Arm, Left [[id number]] Collected: 04/18/23 1643    Specimen: Blood from Arm, Left Updated: 04/18/23 1647         The patient was seen and evaluated the ED by me.  The above history and physical examination was performed as document.  Diagnostic data was obtained.  Results reviewed.  Discussed with the patient.  Patient be treated for acute bronchitis.  Blood cultures are pending.  Patient is aware and agreeable to this treatment plan.    Imaging:  CT Chest Without Contrast Diagnostic    Result Date: 4/18/2023  PROCEDURE: CT CHEST WO CONTRAST DIAGNOSTIC  COMPARISON: [facility], CT, CHEST W/ CONTRAST, 6/24/2019, 11:54.  [facility], CR, XR CHEST 1 VW, 4/18/2023, 13:58.  [facility], CT, CT ABDOMEN PELVIS W CONTRAST, 1/15/2022, 22:44.  INDICATIONS: Shortness of breath, cough, rigors  TECHNIQUE: CT images were created without the administration of contrast material.   PROTOCOL:   Standard imaging protocol performed    RADIATION:   DLP: 692.4mGy*cm   Automated exposure control was utilized to minimize radiation dose.  FINDINGS:  Lung window images reveal subsegmental atelectasis and/or linear fibrosis in the left upper lobe and at the lung bases.  No consolidation or mass is evident.  Mediastinal windows reveal no mediastinal, hilar, or  axillary adenopathy.  Extensive coronary artery calcifications are evident.  A small hiatal hernia is seen.  The liver is of normal size.  The liver is of diffusely diminished density consistent with mild fatty infiltration.  Moderate degenerative spurring is seen in the thoracic spine.        CT scan of the chest without IV contrast demonstrating subsegmental atelectasis and/or linear fibrosis in the left upper lobe and at the lung bases.     JOSE MARTIN VENEGAS MD       Electronically Signed and Approved By: JOSE MARTIN VENEGAS MD on 4/18/2023 at 16:22             XR Chest 1 View    Result Date: 4/18/2023  PROCEDURE: XR CHEST 1 VW  COMPARISON: [facility], , CHEST AP/PA 1 VIEW, 8/14/2020, 22:41.  INDICATIONS: SOA Triage Protocol  FINDINGS:  Heart size and pulmonary vessels are within normal limits.  Lungs are clear bilaterally.  No pleural effusions are seen.  There is chronic elevation the right hemidiaphragm.        1. No acute cardiopulmonary disease.       STEPHANE BRIGHT MD       Electronically Signed and Approved By: STEPHANE BRIGHT MD on 4/18/2023 at 14:24               Differential Diagnosis and Discussion:    Dyspnea: Differential diagnosis includes but is not limited to metabolic acidosis, neurological disorders, psychogenic, asthma, pneumothorax, upper airway obstruction, COPD, pneumonia, noncardiogenic pulmonary edema, interstitial lung disease, anemia, congestive heart failure, and pulmonary embolism  Sore Throat: Differential diagnosis includes but is not limited to bacterial infection, viral infection, inhaled irritants, sinus drainage, thyroiditis, epiglottitis, and retropharyngeal abscess.    All labs were reviewed and interpreted by me.  All X-rays impressions were independently interpreted by me.  EKG was interpreted by me.  CT scan radiology impression was interpreted by me.    MDM         Patient Care Considerations:        Consultants/Shared Management Plan:    None    Social Determinants  of Health:    Patient is independent, reliable, and has access to care.     Disposition and Care Coordination:    Discharged: The patient is suitable and stable for discharge with no need for consideration of observation or admission.    I have explained the patient´s condition, diagnoses and treatment plan based on the information available to me at this time. I have answered questions and addressed any concerns. The patient has a good  understanding of the patient´s diagnosis, condition, and treatment plan as can be expected at this point. The vital signs have been stable. The patient´s condition is stable and appropriate for discharge from the emergency department.      The patient will pursue further outpatient evaluation with the primary care physician or other designated or consulting physician as outlined in the discharge instructions. They are agreeable to this plan of care and follow-up instructions have been explained in detail. The patient has received these instructions in written format and have expressed an understanding of the discharge instructions. The patient is aware that any significant change in condition or worsening of symptoms should prompt an immediate return to this or the closest emergency department or call to 911.  I have explained discharge medications and the need for follow up with the patient/caretakers. This was also printed in the discharge instructions. Patient was discharged with the following medications and follow up:      Medication List      No changes were made to your prescriptions during this visit.      Nic Anderson, DO  [address] [facility] [place] [address] [phone]    In 1 week  If symptoms fail to resolve or improve       Final diagnoses:   Acute bronchitis, unspecified organism        ED Disposition     ED Disposition   Discharge    Condition   Stable    Comment   --             This medical record created using voice recognition software.    Documentation  assistance provided by Christi Jorgensen acting a scribe for Uziel Mann DO. Information recorded by the scribe was verified and validated at my direction.     Christi Jorgensen  04/18/23 1508       Uziel Mann DO  04/18/23 1725

## 2023-04-20 LAB — BACTERIA SPEC AEROBE CULT: NORMAL

## 2023-04-23 LAB
BACTERIA SPEC AEROBE CULT: NORMAL
BACTERIA SPEC AEROBE CULT: NORMAL

## 2023-04-26 LAB — QT INTERVAL: 392 MS

## 2023-11-21 ENCOUNTER — APPOINTMENT (OUTPATIENT)
Dept: CT IMAGING | Facility: HOSPITAL | Age: 68
DRG: 872 | End: 2023-11-21
Payer: OTHER GOVERNMENT

## 2023-11-21 ENCOUNTER — HOSPITAL ENCOUNTER (INPATIENT)
Facility: HOSPITAL | Age: 68
LOS: 10 days | Discharge: HOME HEALTH | DRG: 872 | End: 2023-12-01
Attending: EMERGENCY MEDICINE | Admitting: FAMILY MEDICINE
Payer: OTHER GOVERNMENT

## 2023-11-21 DIAGNOSIS — A41.9 SEPSIS, DUE TO UNSPECIFIED ORGANISM, UNSPECIFIED WHETHER ACUTE ORGAN DYSFUNCTION PRESENT: Primary | ICD-10-CM

## 2023-11-21 DIAGNOSIS — L03.116 CELLULITIS OF LEFT LOWER EXTREMITY: ICD-10-CM

## 2023-11-21 DIAGNOSIS — R26.2 DIFFICULTY WALKING: ICD-10-CM

## 2023-11-21 LAB
ALBUMIN SERPL-MCNC: 3.9 G/DL (ref 3.5–5.2)
ALBUMIN/GLOB SERPL: 0.9 G/DL
ALP SERPL-CCNC: 90 U/L (ref 39–117)
ALT SERPL W P-5'-P-CCNC: 16 U/L (ref 1–41)
ANION GAP SERPL CALCULATED.3IONS-SCNC: 13.7 MMOL/L (ref 5–15)
AST SERPL-CCNC: 18 U/L (ref 1–40)
BASOPHILS # BLD AUTO: 0.07 10*3/MM3 (ref 0–0.2)
BASOPHILS NFR BLD AUTO: 0.4 % (ref 0–1.5)
BILIRUB SERPL-MCNC: 0.9 MG/DL (ref 0–1.2)
BUN SERPL-MCNC: 13 MG/DL (ref 8–23)
BUN/CREAT SERPL: 9.9 (ref 7–25)
CALCIUM SPEC-SCNC: 8.5 MG/DL (ref 8.6–10.5)
CHLORIDE SERPL-SCNC: 100 MMOL/L (ref 98–107)
CO2 SERPL-SCNC: 23.3 MMOL/L (ref 22–29)
CREAT SERPL-MCNC: 1.31 MG/DL (ref 0.76–1.27)
CRP SERPL-MCNC: 16.64 MG/DL (ref 0–0.5)
D-LACTATE SERPL-SCNC: 2.2 MMOL/L (ref 0.5–2)
DEPRECATED RDW RBC AUTO: 43.4 FL (ref 37–54)
EGFRCR SERPLBLD CKD-EPI 2021: 59.7 ML/MIN/1.73
EOSINOPHIL # BLD AUTO: 0.05 10*3/MM3 (ref 0–0.4)
EOSINOPHIL NFR BLD AUTO: 0.3 % (ref 0.3–6.2)
ERYTHROCYTE [DISTWIDTH] IN BLOOD BY AUTOMATED COUNT: 13.3 % (ref 12.3–15.4)
ERYTHROCYTE [SEDIMENTATION RATE] IN BLOOD: 62 MM/HR (ref 0–20)
GLOBULIN UR ELPH-MCNC: 4.4 GM/DL
GLUCOSE SERPL-MCNC: 231 MG/DL (ref 65–99)
HCT VFR BLD AUTO: 39.2 % (ref 37.5–51)
HGB BLD-MCNC: 13 G/DL (ref 13–17.7)
HOLD SPECIMEN: NORMAL
HOLD SPECIMEN: NORMAL
IMM GRANULOCYTES # BLD AUTO: 0.15 10*3/MM3 (ref 0–0.05)
IMM GRANULOCYTES NFR BLD AUTO: 0.9 % (ref 0–0.5)
LYMPHOCYTES # BLD AUTO: 1.61 10*3/MM3 (ref 0.7–3.1)
LYMPHOCYTES NFR BLD AUTO: 10 % (ref 19.6–45.3)
MCH RBC QN AUTO: 29.5 PG (ref 26.6–33)
MCHC RBC AUTO-ENTMCNC: 33.2 G/DL (ref 31.5–35.7)
MCV RBC AUTO: 88.9 FL (ref 79–97)
MONOCYTES # BLD AUTO: 1.06 10*3/MM3 (ref 0.1–0.9)
MONOCYTES NFR BLD AUTO: 6.6 % (ref 5–12)
NEUTROPHILS NFR BLD AUTO: 13.21 10*3/MM3 (ref 1.7–7)
NEUTROPHILS NFR BLD AUTO: 81.8 % (ref 42.7–76)
NRBC BLD AUTO-RTO: 0 /100 WBC (ref 0–0.2)
PLATELET # BLD AUTO: 241 10*3/MM3 (ref 140–450)
PMV BLD AUTO: 10.3 FL (ref 6–12)
POTASSIUM SERPL-SCNC: 3.8 MMOL/L (ref 3.5–5.2)
PROT SERPL-MCNC: 8.3 G/DL (ref 6–8.5)
RBC # BLD AUTO: 4.41 10*6/MM3 (ref 4.14–5.8)
SODIUM SERPL-SCNC: 137 MMOL/L (ref 136–145)
WBC NRBC COR # BLD AUTO: 16.15 10*3/MM3 (ref 3.4–10.8)
WHOLE BLOOD HOLD COAG: NORMAL
WHOLE BLOOD HOLD SPECIMEN: NORMAL

## 2023-11-21 PROCEDURE — 90715 TDAP VACCINE 7 YRS/> IM: CPT | Performed by: EMERGENCY MEDICINE

## 2023-11-21 PROCEDURE — 73701 CT LOWER EXTREMITY W/DYE: CPT

## 2023-11-21 PROCEDURE — 25010000002 PIPERACILLIN SOD-TAZOBACTAM PER 1 G: Performed by: EMERGENCY MEDICINE

## 2023-11-21 PROCEDURE — 90471 IMMUNIZATION ADMIN: CPT | Performed by: EMERGENCY MEDICINE

## 2023-11-21 PROCEDURE — 85652 RBC SED RATE AUTOMATED: CPT | Performed by: EMERGENCY MEDICINE

## 2023-11-21 PROCEDURE — 25510000001 IOPAMIDOL PER 1 ML: Performed by: EMERGENCY MEDICINE

## 2023-11-21 PROCEDURE — 99223 1ST HOSP IP/OBS HIGH 75: CPT | Performed by: STUDENT IN AN ORGANIZED HEALTH CARE EDUCATION/TRAINING PROGRAM

## 2023-11-21 PROCEDURE — 87040 BLOOD CULTURE FOR BACTERIA: CPT

## 2023-11-21 PROCEDURE — 85025 COMPLETE CBC W/AUTO DIFF WBC: CPT

## 2023-11-21 PROCEDURE — 86140 C-REACTIVE PROTEIN: CPT | Performed by: EMERGENCY MEDICINE

## 2023-11-21 PROCEDURE — 87040 BLOOD CULTURE FOR BACTERIA: CPT | Performed by: EMERGENCY MEDICINE

## 2023-11-21 PROCEDURE — 80053 COMPREHEN METABOLIC PANEL: CPT | Performed by: EMERGENCY MEDICINE

## 2023-11-21 PROCEDURE — 36415 COLL VENOUS BLD VENIPUNCTURE: CPT

## 2023-11-21 PROCEDURE — 25010000002 TETANUS-DIPHTH-ACELL PERTUSSIS 5-2.5-18.5 LF-MCG/0.5 SUSPENSION PREFILLED SYRINGE: Performed by: EMERGENCY MEDICINE

## 2023-11-21 PROCEDURE — 99285 EMERGENCY DEPT VISIT HI MDM: CPT

## 2023-11-21 PROCEDURE — 83605 ASSAY OF LACTIC ACID: CPT | Performed by: EMERGENCY MEDICINE

## 2023-11-21 RX ADMIN — TETANUS TOXOID, REDUCED DIPHTHERIA TOXOID AND ACELLULAR PERTUSSIS VACCINE, ADSORBED 0.5 ML: 5; 2.5; 8; 8; 2.5 SUSPENSION INTRAMUSCULAR at 22:36

## 2023-11-21 RX ADMIN — SODIUM CHLORIDE, POTASSIUM CHLORIDE, SODIUM LACTATE AND CALCIUM CHLORIDE 2868 ML: 600; 310; 30; 20 INJECTION, SOLUTION INTRAVENOUS at 22:37

## 2023-11-21 RX ADMIN — IOPAMIDOL 93 ML: 755 INJECTION, SOLUTION INTRAVENOUS at 21:51

## 2023-11-21 RX ADMIN — PIPERACILLIN AND TAZOBACTAM 4.5 G: 4; .5 INJECTION, POWDER, FOR SOLUTION INTRAVENOUS at 22:37

## 2023-11-21 NOTE — LETTER
[facility]  [address]  [place] [address]  [phone]        December 1, 2023      Patient: Raymond M Jr Damico  YOB: 1955  Date of Visit: 11/21/2023    Updated Progress note    Thank you,  Nilsa Read, MSW  [phone]

## 2023-11-22 LAB
ALBUMIN SERPL-MCNC: 3.2 G/DL (ref 3.5–5.2)
ALBUMIN/GLOB SERPL: 0.8 G/DL
ALP SERPL-CCNC: 76 U/L (ref 39–117)
ALT SERPL W P-5'-P-CCNC: 13 U/L (ref 1–41)
ANION GAP SERPL CALCULATED.3IONS-SCNC: 11 MMOL/L (ref 5–15)
AST SERPL-CCNC: 15 U/L (ref 1–40)
BASOPHILS # BLD AUTO: 0.05 10*3/MM3 (ref 0–0.2)
BASOPHILS NFR BLD AUTO: 0.4 % (ref 0–1.5)
BILIRUB SERPL-MCNC: 0.9 MG/DL (ref 0–1.2)
BUN SERPL-MCNC: 13 MG/DL (ref 8–23)
BUN/CREAT SERPL: 10.2 (ref 7–25)
CALCIUM SPEC-SCNC: 8.1 MG/DL (ref 8.6–10.5)
CHLORIDE SERPL-SCNC: 100 MMOL/L (ref 98–107)
CO2 SERPL-SCNC: 22 MMOL/L (ref 22–29)
CREAT SERPL-MCNC: 1.27 MG/DL (ref 0.76–1.27)
D-LACTATE SERPL-SCNC: 1.5 MMOL/L (ref 0.5–2)
D-LACTATE SERPL-SCNC: 2.2 MMOL/L (ref 0.5–2)
D-LACTATE SERPL-SCNC: 2.4 MMOL/L (ref 0.5–2)
DEPRECATED RDW RBC AUTO: 44.3 FL (ref 37–54)
EGFRCR SERPLBLD CKD-EPI 2021: 61.9 ML/MIN/1.73
EOSINOPHIL # BLD AUTO: 0.05 10*3/MM3 (ref 0–0.4)
EOSINOPHIL NFR BLD AUTO: 0.4 % (ref 0.3–6.2)
ERYTHROCYTE [DISTWIDTH] IN BLOOD BY AUTOMATED COUNT: 13.5 % (ref 12.3–15.4)
GLOBULIN UR ELPH-MCNC: 4.1 GM/DL
GLUCOSE BLDC GLUCOMTR-MCNC: 197 MG/DL (ref 70–99)
GLUCOSE BLDC GLUCOMTR-MCNC: 197 MG/DL (ref 70–99)
GLUCOSE BLDC GLUCOMTR-MCNC: 211 MG/DL (ref 70–99)
GLUCOSE BLDC GLUCOMTR-MCNC: 220 MG/DL (ref 70–99)
GLUCOSE BLDC GLUCOMTR-MCNC: 229 MG/DL (ref 70–99)
GLUCOSE BLDC GLUCOMTR-MCNC: 231 MG/DL (ref 70–99)
GLUCOSE SERPL-MCNC: 297 MG/DL (ref 65–99)
HCT VFR BLD AUTO: 35.7 % (ref 37.5–51)
HGB BLD-MCNC: 11.4 G/DL (ref 13–17.7)
IMM GRANULOCYTES # BLD AUTO: 0.12 10*3/MM3 (ref 0–0.05)
IMM GRANULOCYTES NFR BLD AUTO: 0.9 % (ref 0–0.5)
LYMPHOCYTES # BLD AUTO: 1.8 10*3/MM3 (ref 0.7–3.1)
LYMPHOCYTES NFR BLD AUTO: 13.5 % (ref 19.6–45.3)
MAGNESIUM SERPL-MCNC: 1.5 MG/DL (ref 1.6–2.4)
MCH RBC QN AUTO: 28.8 PG (ref 26.6–33)
MCHC RBC AUTO-ENTMCNC: 31.9 G/DL (ref 31.5–35.7)
MCV RBC AUTO: 90.2 FL (ref 79–97)
MONOCYTES # BLD AUTO: 0.89 10*3/MM3 (ref 0.1–0.9)
MONOCYTES NFR BLD AUTO: 6.7 % (ref 5–12)
NEUTROPHILS NFR BLD AUTO: 10.4 10*3/MM3 (ref 1.7–7)
NEUTROPHILS NFR BLD AUTO: 78.1 % (ref 42.7–76)
NRBC BLD AUTO-RTO: 0 /100 WBC (ref 0–0.2)
PHOSPHATE SERPL-MCNC: 2.4 MG/DL (ref 2.5–4.5)
PLATELET # BLD AUTO: 183 10*3/MM3 (ref 140–450)
PMV BLD AUTO: 10.1 FL (ref 6–12)
POTASSIUM SERPL-SCNC: 3.6 MMOL/L (ref 3.5–5.2)
PROCALCITONIN SERPL-MCNC: 1.8 NG/ML (ref 0–0.25)
PROT SERPL-MCNC: 7.3 G/DL (ref 6–8.5)
RBC # BLD AUTO: 3.96 10*6/MM3 (ref 4.14–5.8)
SODIUM SERPL-SCNC: 133 MMOL/L (ref 136–145)
WBC NRBC COR # BLD AUTO: 13.31 10*3/MM3 (ref 3.4–10.8)

## 2023-11-22 PROCEDURE — 25810000003 SODIUM CHLORIDE 0.9 % SOLUTION: Performed by: EMERGENCY MEDICINE

## 2023-11-22 PROCEDURE — 82948 REAGENT STRIP/BLOOD GLUCOSE: CPT

## 2023-11-22 PROCEDURE — 63710000001 INSULIN LISPRO (HUMAN) PER 5 UNITS: Performed by: PHYSICIAN ASSISTANT

## 2023-11-22 PROCEDURE — 99233 SBSQ HOSP IP/OBS HIGH 50: CPT | Performed by: INTERNAL MEDICINE

## 2023-11-22 PROCEDURE — 25810000003 SODIUM CHLORIDE 0.9 % SOLUTION: Performed by: STUDENT IN AN ORGANIZED HEALTH CARE EDUCATION/TRAINING PROGRAM

## 2023-11-22 PROCEDURE — 84145 PROCALCITONIN (PCT): CPT | Performed by: INTERNAL MEDICINE

## 2023-11-22 PROCEDURE — 83735 ASSAY OF MAGNESIUM: CPT | Performed by: STUDENT IN AN ORGANIZED HEALTH CARE EDUCATION/TRAINING PROGRAM

## 2023-11-22 PROCEDURE — 83605 ASSAY OF LACTIC ACID: CPT | Performed by: EMERGENCY MEDICINE

## 2023-11-22 PROCEDURE — 80053 COMPREHEN METABOLIC PANEL: CPT | Performed by: STUDENT IN AN ORGANIZED HEALTH CARE EDUCATION/TRAINING PROGRAM

## 2023-11-22 PROCEDURE — 25010000002 VANCOMYCIN 5 G RECONSTITUTED SOLUTION: Performed by: EMERGENCY MEDICINE

## 2023-11-22 PROCEDURE — 25010000002 VANCOMYCIN 5 G RECONSTITUTED SOLUTION: Performed by: STUDENT IN AN ORGANIZED HEALTH CARE EDUCATION/TRAINING PROGRAM

## 2023-11-22 PROCEDURE — 25010000002 HEPARIN (PORCINE) PER 1000 UNITS: Performed by: STUDENT IN AN ORGANIZED HEALTH CARE EDUCATION/TRAINING PROGRAM

## 2023-11-22 PROCEDURE — 25010000002 PIPERACILLIN SOD-TAZOBACTAM PER 1 G: Performed by: STUDENT IN AN ORGANIZED HEALTH CARE EDUCATION/TRAINING PROGRAM

## 2023-11-22 PROCEDURE — 84100 ASSAY OF PHOSPHORUS: CPT | Performed by: STUDENT IN AN ORGANIZED HEALTH CARE EDUCATION/TRAINING PROGRAM

## 2023-11-22 PROCEDURE — 25810000003 SODIUM CHLORIDE 0.9 % SOLUTION 500 ML FLEX CONT: Performed by: STUDENT IN AN ORGANIZED HEALTH CARE EDUCATION/TRAINING PROGRAM

## 2023-11-22 PROCEDURE — 85025 COMPLETE CBC W/AUTO DIFF WBC: CPT | Performed by: STUDENT IN AN ORGANIZED HEALTH CARE EDUCATION/TRAINING PROGRAM

## 2023-11-22 RX ORDER — INSULIN LISPRO 100 [IU]/ML
2-9 INJECTION, SOLUTION INTRAVENOUS; SUBCUTANEOUS
Status: DISCONTINUED | OUTPATIENT
Start: 2023-11-22 | End: 2023-12-01 | Stop reason: HOSPADM

## 2023-11-22 RX ORDER — IBUPROFEN 600 MG/1
1 TABLET ORAL
Status: DISCONTINUED | OUTPATIENT
Start: 2023-11-22 | End: 2023-12-01 | Stop reason: HOSPADM

## 2023-11-22 RX ORDER — NICOTINE POLACRILEX 4 MG
15 LOZENGE BUCCAL
Status: DISCONTINUED | OUTPATIENT
Start: 2023-11-22 | End: 2023-12-01 | Stop reason: HOSPADM

## 2023-11-22 RX ORDER — SODIUM CHLORIDE 0.9 % (FLUSH) 0.9 %
10 SYRINGE (ML) INJECTION AS NEEDED
Status: DISCONTINUED | OUTPATIENT
Start: 2023-11-22 | End: 2023-12-01 | Stop reason: HOSPADM

## 2023-11-22 RX ORDER — POLYETHYLENE GLYCOL 3350 17 G/17G
17 POWDER, FOR SOLUTION ORAL DAILY PRN
Status: DISCONTINUED | OUTPATIENT
Start: 2023-11-22 | End: 2023-12-01 | Stop reason: HOSPADM

## 2023-11-22 RX ORDER — LEVOTHYROXINE SODIUM 175 UG/1
175 TABLET ORAL
Status: DISCONTINUED | OUTPATIENT
Start: 2023-11-22 | End: 2023-12-01 | Stop reason: HOSPADM

## 2023-11-22 RX ORDER — BISACODYL 5 MG/1
5 TABLET, DELAYED RELEASE ORAL DAILY PRN
Status: DISCONTINUED | OUTPATIENT
Start: 2023-11-22 | End: 2023-12-01 | Stop reason: HOSPADM

## 2023-11-22 RX ORDER — DEXTROSE MONOHYDRATE 25 G/50ML
25 INJECTION, SOLUTION INTRAVENOUS
Status: DISCONTINUED | OUTPATIENT
Start: 2023-11-22 | End: 2023-12-01 | Stop reason: HOSPADM

## 2023-11-22 RX ORDER — HEPARIN SODIUM 5000 [USP'U]/ML
5000 INJECTION, SOLUTION INTRAVENOUS; SUBCUTANEOUS EVERY 8 HOURS SCHEDULED
Status: DISCONTINUED | OUTPATIENT
Start: 2023-11-22 | End: 2023-12-01 | Stop reason: HOSPADM

## 2023-11-22 RX ORDER — BISACODYL 10 MG
10 SUPPOSITORY, RECTAL RECTAL DAILY PRN
Status: DISCONTINUED | OUTPATIENT
Start: 2023-11-22 | End: 2023-12-01 | Stop reason: HOSPADM

## 2023-11-22 RX ORDER — TRAZODONE HYDROCHLORIDE 100 MG/1
200 TABLET ORAL NIGHTLY PRN
COMMUNITY

## 2023-11-22 RX ORDER — PANTOPRAZOLE SODIUM 40 MG/1
40 TABLET, DELAYED RELEASE ORAL
Status: DISCONTINUED | OUTPATIENT
Start: 2023-11-22 | End: 2023-12-01 | Stop reason: HOSPADM

## 2023-11-22 RX ORDER — ATORVASTATIN CALCIUM 40 MG/1
80 TABLET, FILM COATED ORAL NIGHTLY
Status: DISCONTINUED | OUTPATIENT
Start: 2023-11-22 | End: 2023-12-01 | Stop reason: HOSPADM

## 2023-11-22 RX ORDER — INSULIN LISPRO 100 [IU]/ML
2-7 INJECTION, SOLUTION INTRAVENOUS; SUBCUTANEOUS
Status: DISCONTINUED | OUTPATIENT
Start: 2023-11-22 | End: 2023-11-22

## 2023-11-22 RX ORDER — SODIUM CHLORIDE 9 MG/ML
40 INJECTION, SOLUTION INTRAVENOUS AS NEEDED
Status: DISCONTINUED | OUTPATIENT
Start: 2023-11-22 | End: 2023-12-01 | Stop reason: HOSPADM

## 2023-11-22 RX ORDER — PAROXETINE HYDROCHLORIDE 20 MG/1
60 TABLET, FILM COATED ORAL EVERY MORNING
Status: DISCONTINUED | OUTPATIENT
Start: 2023-11-22 | End: 2023-12-01 | Stop reason: HOSPADM

## 2023-11-22 RX ORDER — SODIUM CHLORIDE 0.9 % (FLUSH) 0.9 %
10 SYRINGE (ML) INJECTION EVERY 12 HOURS SCHEDULED
Status: DISCONTINUED | OUTPATIENT
Start: 2023-11-22 | End: 2023-12-01 | Stop reason: HOSPADM

## 2023-11-22 RX ORDER — HYDROXYZINE HYDROCHLORIDE 10 MG/1
30 TABLET, FILM COATED ORAL DAILY
Status: DISCONTINUED | OUTPATIENT
Start: 2023-11-22 | End: 2023-12-01 | Stop reason: HOSPADM

## 2023-11-22 RX ORDER — AMOXICILLIN 250 MG
2 CAPSULE ORAL 2 TIMES DAILY
Status: DISCONTINUED | OUTPATIENT
Start: 2023-11-22 | End: 2023-12-01 | Stop reason: HOSPADM

## 2023-11-22 RX ORDER — KETOCONAZOLE 20 MG/G
1 CREAM TOPICAL
Status: DISCONTINUED | OUTPATIENT
Start: 2023-11-22 | End: 2023-12-01 | Stop reason: HOSPADM

## 2023-11-22 RX ADMIN — KETOCONAZOLE 1 APPLICATION: 20 CREAM TOPICAL at 08:32

## 2023-11-22 RX ADMIN — INSULIN LISPRO 4 UNITS: 100 INJECTION, SOLUTION INTRAVENOUS; SUBCUTANEOUS at 08:32

## 2023-11-22 RX ADMIN — INSULIN LISPRO 4 UNITS: 100 INJECTION, SOLUTION INTRAVENOUS; SUBCUTANEOUS at 16:48

## 2023-11-22 RX ADMIN — VANCOMYCIN HYDROCHLORIDE 750 MG: 5 INJECTION, POWDER, LYOPHILIZED, FOR SOLUTION INTRAVENOUS at 14:02

## 2023-11-22 RX ADMIN — HEPARIN SODIUM 5000 UNITS: 5000 INJECTION INTRAVENOUS; SUBCUTANEOUS at 14:03

## 2023-11-22 RX ADMIN — PIPERACILLIN AND TAZOBACTAM 4.5 G: 4; .5 INJECTION, POWDER, FOR SOLUTION INTRAVENOUS at 14:49

## 2023-11-22 RX ADMIN — MICONAZOLE NITRATE 1 APPLICATION: 2 POWDER TOPICAL at 21:11

## 2023-11-22 RX ADMIN — PIPERACILLIN AND TAZOBACTAM 4.5 G: 4; .5 INJECTION, POWDER, FOR SOLUTION INTRAVENOUS at 21:10

## 2023-11-22 RX ADMIN — PANTOPRAZOLE SODIUM 40 MG: 40 TABLET, DELAYED RELEASE ORAL at 06:14

## 2023-11-22 RX ADMIN — ATORVASTATIN CALCIUM 80 MG: 40 TABLET, FILM COATED ORAL at 21:10

## 2023-11-22 RX ADMIN — MINERAL OIL, PETROLATUM, PHENYLEPHRINE HCI: .25; 14; 74.9 OINTMENT TOPICAL at 11:58

## 2023-11-22 RX ADMIN — HEPARIN SODIUM 5000 UNITS: 5000 INJECTION INTRAVENOUS; SUBCUTANEOUS at 06:14

## 2023-11-22 RX ADMIN — Medication 10 ML: at 21:10

## 2023-11-22 RX ADMIN — MICONAZOLE NITRATE 1 APPLICATION: 2 POWDER TOPICAL at 02:58

## 2023-11-22 RX ADMIN — PIPERACILLIN AND TAZOBACTAM 4.5 G: 4; .5 INJECTION, POWDER, FOR SOLUTION INTRAVENOUS at 06:14

## 2023-11-22 RX ADMIN — SODIUM CHLORIDE 40 ML: 9 INJECTION, SOLUTION INTRAVENOUS at 06:14

## 2023-11-22 RX ADMIN — INSULIN LISPRO 2 UNITS: 100 INJECTION, SOLUTION INTRAVENOUS; SUBCUTANEOUS at 02:57

## 2023-11-22 RX ADMIN — Medication 10 ML: at 08:32

## 2023-11-22 RX ADMIN — LEVOTHYROXINE SODIUM 175 MCG: 175 TABLET ORAL at 06:14

## 2023-11-22 RX ADMIN — ATORVASTATIN CALCIUM 80 MG: 40 TABLET, FILM COATED ORAL at 02:58

## 2023-11-22 RX ADMIN — PAROXETINE HYDROCHLORIDE 60 MG: 20 TABLET, FILM COATED ORAL at 09:49

## 2023-11-22 RX ADMIN — DOCUSATE SODIUM 50MG AND SENNOSIDES 8.6MG 2 TABLET: 8.6; 5 TABLET, FILM COATED ORAL at 08:32

## 2023-11-22 RX ADMIN — INSULIN LISPRO 4 UNITS: 100 INJECTION, SOLUTION INTRAVENOUS; SUBCUTANEOUS at 11:58

## 2023-11-22 RX ADMIN — Medication 10 ML: at 02:58

## 2023-11-22 RX ADMIN — VANCOMYCIN HYDROCHLORIDE 2750 MG: 5 INJECTION, POWDER, LYOPHILIZED, FOR SOLUTION INTRAVENOUS at 01:02

## 2023-11-22 RX ADMIN — HYDROXYZINE HYDROCHLORIDE 30 MG: 10 TABLET ORAL at 08:32

## 2023-11-22 RX ADMIN — INSULIN LISPRO 4 UNITS: 100 INJECTION, SOLUTION INTRAVENOUS; SUBCUTANEOUS at 21:10

## 2023-11-22 RX ADMIN — HEPARIN SODIUM 5000 UNITS: 5000 INJECTION INTRAVENOUS; SUBCUTANEOUS at 21:10

## 2023-11-22 NOTE — H&P
[facility]  Date: 2023   Patient Name: Raymond M Jr Damico  : 1955  MRN: [id number]  Primary Care Physician:  Nic Anderson DO  Date of admission: 2023    Subjective   Subjective     Chief Complaint: Left leg erythema, fever    HPI:    Raymond M Jr Damico is a 68 y.o. male  with a past medical history of hypertension, hypothyroidism, type 2 diabetes mellitus presented to the ED for evaluation of left lower leg erythema, swelling and fever.  Approximately 10 days ago patient had scraped his left shin region with wood logs with a superficial skin lesion.  Yesterday patient's wife noted erythema of left shin region, today patient noted to have temperature of 101 at home and worsening erythema of the left leg and has been brought into the ED for further evaluation.  Patient took Tylenol prior to coming to the ED.  Patient has a pet rabbit at home and he had few areas of cuts from the rabbit nails on his right hand with surrounding erythema that started yesterday.  Patient states that the rabbit is vaccinated.  Denies any chest pain, shortness of breath, nausea, vomiting, focal weakness, numbness, tingling, diarrhea, dysuria.    In the ED, vital signs temperature 99.8, pulse 108, respiratory 20, blood pressure 136/62 on room air saturating around 94%.  Labs showed normal sodium, normal potassium, creatinine 1.31, baseline was around 1.1, glucose 231, rest of the CMP with no significant findings, CRP 16.64, lactic acid 2.2, WBC elevated to 16.15, rest of the CBC with no significant findings, ESR 62, CT of the left lower extremity with contrast showed 2 mm calcification of foreign body in the soft tissue anterior to the mid to distal tibia.  Lower leg subcutaneous edema and skin thickening which may be due to cellulitis.  Ill-defined fluid in the subcutaneous fat of the left lower leg but no rim-enhancing fluid collection is seen.  Other arthritic changes are  "noted.  Receiving IV fluids, Zosyn and vancomycin in the ED.  Patient has been admitted for further evaluation and management of cellulitis of the left lower extremity    Personal History     Past Medical History:   Diagnosis Date   • Callus    • COPD (chronic obstructive pulmonary disease)    • Diabetes mellitus    • Gout    • Hypertension          Past Surgical History:   Procedure Laterality Date   • CARPAL TUNNEL RELEASE     • EYE SURGERY     • JOINT REPLACEMENT           Family History   Problem Relation Age of Onset   • Cancer Father    • Diabetes Father    • Heart disease Neg Hx    • Hypertension Neg Hx    • Thyroid disease Neg Hx          Social History     Socioeconomic History   • Marital status:    Tobacco Use   • Smoking status: Former     Packs/day: 1.00     Years: 40.00     Additional pack years: 0.00     Total pack years: 40.00     Types: Cigarettes, Cigars     Quit date: 12/19/2012     Years since quitting: 10.9   • Tobacco comments:     Never should have started   Vaping Use   • Vaping Use: Never used   Substance and Sexual Activity   • Alcohol use: Yes     Alcohol/week: 3.0 standard drinks of alcohol     Types: 3 Shots of liquor per week     Comment: social   • Drug use: Yes     Frequency: 2.0 times per week     Types: Marijuana   • Sexual activity: Yes     Partners: Female     Birth control/protection: None         Home Medications:  Cyanocobalamin, PARoxetine, Semaglutide(0.25 or 0.5MG/DOS), allopurinol, atorvastatin, empagliflozin, furosemide, hydrOXYzine, levothyroxine, losartan, metFORMIN, omeprazole, and traZODone    Allergies:  Allergies   Allergen Reactions   • Morphine Shortness Of Breath     \"stops breathing\"       Review of Systems   All other systems reviewed and negative except as mentioned above in the HPI    Objective   Objective     Vitals:   Temp:  [99.8 °F (37.7 °C)] 99.8 °F (37.7 °C)  Heart Rate:  [108] 108  Resp:  [20] 20  BP: (126-143)/(62-75) 132/69    Physical " Exam    Constitutional: Awake, alert, no acute distress   Eyes: Pupils equal, sclerae anicteric, no conjunctival injection   HENT: NCAT, mucous membranes moist   Respiratory: Clear to auscultation bilaterally, nonlabored respirations    Cardiovascular: RRR, no murmurs, rubs, or gallops, palpable pedal pulses bilaterally   Gastrointestinal: Positive bowel sounds, soft, nontender, nondistended   Musculoskeletal: Superficial skin wound on the shin of the left leg with surrounding erythema extending up to the knee region.  Mild tenderness of the left lower extremity.  Warm to touch.  Few areas of small superficial skin Cuts in the right hand, no tenderness in the right hand region.  No bilateral ankle edema, no clubbing or cyanosis to extremities   Neurologic: Oriented x 3, speech clear      Result Review    Result Review:  I have personally reviewed the results from the time of this admission to 11/22/2023 01:04 EST and agree with these findings:  [x]  Laboratory  []  Microbiology  [x]  Radiology  []  EKG/Telemetry   []  Cardiology/Vascular   []  Pathology  []  Old records  []  Other:        Imaging Results (Last 24 Hours)       Procedure Component Value Units Date/Time    CT Lower Extremity Left With Contrast [[id number]] Collected: 11/21/23 2232     Updated: 11/21/23 2235    Narrative:      PROCEDURE: CT LOWER EXTREMITY LEFT W CONTRAST     COMPARISON: None     INDICATIONS: LOWER LEG PAIN AND REDNESS X 1 DAY, POST INJURY X 10 DAYS. Infection involving the mid   to lower tibia     PROTOCOL:   Standard imaging protocol performed      RADIATION:   DLP: 293.5 mGy*cm    Automated exposure control was utilized to minimize radiation dose.   CONTRAST: 92 cc Isovue 370 I.V.  LABS:   eGFR: 59.7 ml/min/1.73m2     TECHNIQUE: After obtaining the patient's consent, multi-planar CT images of the extremity were   created with non-ionic intravenous contrast.       FINDINGS:   There is a 2 mm calcification or radiopaque foreign body  anterior to the mid to distal tibia on   axial image 156. No acute fracture is seen.  There is edema in the subcutaneous fat and skin   thickening of the lower leg.  There is ill-defined fluid in the subcutaneous fat of the lateral   left lower leg.  No rim enhancing fluid collection is seen.     Tricompartmental arthritic changes of the knee and suprapatellar joint effusion are included.    There also degenerative changes of the partially included hindfoot.         Impression:         1. 2 mm calcification or foreign body in the soft tissues anterior to the mid to distal tibia.  2. Lower leg subcutaneous edema and skin thickening, which may be due to cellulitis.  Ill-defined   fluid in the subcutaneous fat of the lateral lower leg, but no rim enhancing fluid collection is   seen.  3. Tricompartmental arthritic changes of the knee and partially included suprapatellar joint   effusion.            ALHAJI MARTINEZ MD         Electronically Signed and Approved By: ALHAJI MARTINEZ MD on 11/21/2023 at 22:32                              vancomycin, 20 mg/kg, Intravenous, Once         Assessment & Plan   Assessment / Plan     Assessment/Plan:   Sepsis, present on admission-fever, elevated white count  Cellulitis of the left lower extremity  Mildly elevated creatinine  Elevated lactic acid   Superficial skin cuts from the rabbit nails of the right hand  Intertrigo of the abdominal folds and groin  Hypertension  Hypothyroidism  Type 2 diabetes mellitus  Gout  Hyperlipidemia    Plan  Admit to observation, remote telemetry  Continue Zosyn and vancomycin  Monitor electrolytes, kidney function with a.m. labs  Follow-up on blood cultures  Trend lactic acid   Heart healthy, consistent carb diet  Low-dose sliding scale insulin  Topical Ketoconazole  Resume other appropriate home medications once reconciled      DVT prophylaxis:  No DVT prophylaxis order currently exists.    CODE STATUS:    Level Of Support Discussed With:  Patient  Code Status (Patient has no pulse and is not breathing): CPR (Attempt to Resuscitate)  Medical Interventions (Patient has pulse or is breathing): Full Support      Admission Status:  I believe this patient meets inpatient status.    Part of this note may be an electronic transcription/translation of spoken language to printed text using the Dragon Dictation System    Sly Oneill MD

## 2023-11-22 NOTE — ED TRIAGE NOTES
Pt to ED from home per [facility] with reports or left lower leg redness/swelling.      Pt states he hit leg with tree branch ten days ago and today developed redness and swelling to leg.      Wife states he has hx of MRSA.

## 2023-11-22 NOTE — SIGNIFICANT NOTE
Wound Eval / Progress Noted     Rina     Patient Name: Raymond M Jr Damico  : 1955  MRN: [id number]  Today's Date: 2023                 Admit Date: 2023    Visit Dx:    ICD-10-CM ICD-9-CM   1. Sepsis, due to unspecified organism, unspecified whether acute organ dysfunction present  A41.9 038.9     995.91   2. Cellulitis of left lower extremity  L03.116 682.6         Cellulitis of left lower extremity        Past Medical History:   Diagnosis Date    Callus     COPD (chronic obstructive pulmonary disease)     Diabetes mellitus     Gout     Hypertension         Past Surgical History:   Procedure Laterality Date    CARPAL TUNNEL RELEASE      EYE SURGERY      JOINT REPLACEMENT           Physical Assessment:  Wound 23 0150 Left lower leg Other (comment) (Active)   Wound Image   23   Dressing Appearance dry;intact 23   Closure None 23   Base moist;red;yellow 23   Periwound dry;intact;edematous;redness 23   Periwound Temperature warm 23   Periwound Skin Turgor soft 23   Edges open 23   Drainage Characteristics/Odor serosanguineous 23   Drainage Amount small 23   Care, Wound cleansed with;sterile normal saline 23   Dressing Care dressing applied;hydrofiber;silver impregnated;silicone;border dressing 23   Periwound Care absorptive dressing applied 23       Wound 23 015 Left anterior hip MASD (Moisture associated skin damage) (Active)   Wound Image   23   Dressing Appearance open to air 23   Closure None 23   Base pink;red;moist;yeast 23   Periwound moist;redness;pink;yeast 23   Periwound Temperature warm 23   Periwound Skin Turgor soft 23   Edges open 23   Drainage Characteristics/Odor creamy;yellow;malodorous 23   Drainage Amount small 23    Care, Wound cleansed with;soap and water 11/22/23 0330   Dressing Care open to air 11/22/23 0946   Periwound Care topical treatment applied;dry periwound area maintained 11/22/23 0330       Wound 11/22/23 0150 Right anterior hip MASD (Moisture associated skin damage) (Active)   Wound Image   11/22/23 0330   Dressing Appearance open to air 11/22/23 0946   Closure None 11/22/23 0946   Base moist;pink;red;yeast 11/22/23 0946   Periwound moist;pink;redness;yeast 11/22/23 0946   Periwound Temperature warm 11/22/23 0946   Periwound Skin Turgor soft 11/22/23 0946   Edges rolled/closed 11/22/23 0946   Drainage Characteristics/Odor yellow;creamy;malodorous 11/22/23 0946   Drainage Amount small 11/22/23 0946   Care, Wound cleansed with;soap and water 11/22/23 0330   Dressing Care open to air 11/22/23 0946       Wound 11/22/23 0150 Right lower arm Skin Tear (Active)   Wound Image   11/22/23 0330   Dressing Appearance dry;intact 11/22/23 0946   Closure None 11/22/23 0946   Base moist;red;yellow 11/22/23 0946   Periwound dry;intact;pink 11/22/23 0946   Periwound Temperature warm 11/22/23 0946   Periwound Skin Turgor soft 11/22/23 0946   Drainage Characteristics/Odor serosanguineous;yellow 11/22/23 0946   Drainage Amount small 11/22/23 0946   Care, Wound cleansed with;sterile normal saline 11/22/23 0946   Dressing Care dressing applied;hydrofiber;silver impregnated;silicone;border dressing 11/22/23 0946   Periwound Care absorptive dressing applied 11/22/23 0946       Wound 11/22/23 0150 Left gluteal Other (comment) (Active)   Wound Image   11/22/23 0330   Dressing Appearance open to air 11/22/23 0946   Closure None 11/22/23 0946   Base dry;scab 11/22/23 0946   Periwound dry;intact;pink 11/22/23 0946   Periwound Temperature warm 11/22/23 0946   Periwound Skin Turgor soft 11/22/23 0946   Edges rolled/closed 11/22/23 0946   Drainage Amount none 11/22/23 0946   Care, Wound cleansed with;sterile normal saline;barrier applied  11/22/23 0946   Dressing Care open to air 11/22/23 0946   Periwound Care barrier ointment applied 11/22/23 0946       Wound 11/22/23 0150 Bilateral gluteal MASD (Moisture associated skin damage) (Active)   Wound Image   11/22/23 0330   Pressure Injury Stage O 11/22/23 0946   Dressing Appearance open to air 11/22/23 0946   Closure None 11/22/23 0946   Base pink;red;moist;blanchable 11/22/23 0946   Periwound dry;intact;pink;redness;blanchable 11/22/23 0946   Periwound Temperature warm 11/22/23 0946   Periwound Skin Turgor soft 11/22/23 0946   Edges open;rolled/closed 11/22/23 0946   Drainage Characteristics/Odor serosanguineous 11/22/23 0946   Drainage Amount scant 11/22/23 0946   Care, Wound cleansed with;sterile normal saline;barrier applied 11/22/23 0946   Dressing Care open to air 11/22/23 0946   Periwound Care barrier ointment applied 11/22/23 0946      Wound Check / Follow-up: Patient seen today for wound consult. Patient is awake and alert at time of visit.  Patient with traumatic injury to left anterior lower leg. Wound base red and moist with some yellow tissue present as well. Erythema and edema noted to left lower leg. Patient admitted for cellulitis of LLE. Cleansed wound with normal saline and gauze. Recommending daily dressing changes with silver impregnated hydrofiber and silicone border dressing.  Bilateral groin folds/creases with moisture, redness and yeast present. Recommending topical treatment with antifungal powder and then pad folds/creases with moisture wicking fabric.  Moisture and blanchable redness noted within gluteal crease. Small amount of erosion present. There is also a small crusted area to left gluteal aspect. Recommending skin care/protection with application of barrier cream.   Traumatic injury noted to right anterior wrist, which patient states is a result of being scratched by his pet rabbit. Moist red and yellow tissue noted to wound base. Cleansed with normal saline and gauze.  Recommending daily dressing changes with silver impregnated hydrofiber and silicone border dressing.  Implement every two hour turns, offload heels, keep patient free from moisture/moisture managed.      Impression: Traumatic injuries. ITD with yeast. MASD and blanchable redness.        Short term goals: Regain skin integrity, skin protection, moisture prevention, pressure reduction, topical treatment, skin care, daily dressing changes.       Christel Henning RN    11/22/2023    11:44 EST

## 2023-11-22 NOTE — PROGRESS NOTES
"[facility] Clinical Pharmacy Services: Vancomycin Pharmacokinetic Initial Consult Note    Raymond M Jr Damico is a 68 y.o. male who is on day  of pharmacy to dose vancomycin for Sepsis and Skin and Soft Tissue.    Consult Information  Consulting Provider: Mai  Planned Duration of Therapy: 7 days  Loading Dose  Given: vancomycin 2750mg iv x 1 this am.  PK/PD Target: -600 mg/L.hr   Other Antimicrobials: Piperacillin/Tazobactam    Imaging Reviewed?: Yes    Microbiology Data   blood cx ordered       Vitals/Labs  Ht: 175.3 cm (69.02\"); Wt: 134 kg (295 lb 3.1 oz)  Temp (24hrs), Av.6 °F (37 °C), Min:98.1 °F (36.7 °C), Max:99.8 °F (37.7 °C)   Estimated Creatinine Clearance: 76.6 mL/min (by C-G formula based on SCr of 1.27 mg/dL).       Results from last 7 days   Lab Units 23  0520 23   CREATININE mg/dL 1.27 1.31*   WBC 10*3/mm3 13.31* 16.15*     Assessment/Plan:    Vancomycin Dose:  750 mg IV every 12 hours; which provides the following predicted parameters:  Exposure target: AUC24 (range)400-600 mg/L.hr   AUC24,ss: 518 mg/L.hr  PAUC*: 69 %  Ctrough,ss: 16.5 mg/L  Pconc*: 39 %  Tox.: 12 %    Vanc Trough ordered for tomorrow.    Pharmacy will follow patient's kidney function and will adjust doses and obtain levels as necessary. Thank you for involving pharmacy in this patient's care. Please contact pharmacy with any questions or concerns.                           Ruth Ann Fonseca  Clinical Pharmacist    "

## 2023-11-22 NOTE — ED PROVIDER NOTES
"Time: 9:25 PM EST  Date of encounter:  11/21/2023  Independent Historian/Clinical History and Information was obtained by:   Patient and wife  Chief Complaint: Leg wound    History is limited by: N/A    History of Present Illness:  Patient is a 68 y.o. year old male who presents to the emergency department for evaluation of left leg wound.  Patient states that he dropped a piece of wood on his left leg about 10 days ago.  Patient states that it struck his anterior tibia region.  Patient is unsure when his last tetanus was.  He states that he was doing fine until last night when his wife noticed some redness around wound area.  This morning she noticed that there was increasing redness so she gloria a line around it before going to work.  Patient been running a fever today of 101.  Patient was took a gram of Tylenol around 6:30 PM.  Patient's wife states when she got home she found that he was still in bed he was reported he was too weak to take and be up.  The wife noticed that the redness has expanded significantly past the line that she gloria this morning and thus she brought him to the hospital for evaluation.  Additionally she states that he was too weak for her to get him out of bed.    HPI    Patient Care Team  Primary Care Provider: Nic Anderson DO    Past Medical History:     Allergies   Allergen Reactions    Morphine Shortness Of Breath     \"stops breathing\"     Past Medical History:   Diagnosis Date    Callus     COPD (chronic obstructive pulmonary disease)     Diabetes mellitus     Gout     Hypertension      Past Surgical History:   Procedure Laterality Date    CARPAL TUNNEL RELEASE      EYE SURGERY      JOINT REPLACEMENT       Family History   Problem Relation Age of Onset    Cancer Father     Diabetes Father     Heart disease Neg Hx     Hypertension Neg Hx     Thyroid disease Neg Hx        Home Medications:  Prior to Admission medications    Medication Sig Start Date End Date Taking? Authorizing " Provider   allopurinol (ZYLOPRIM) 300 MG tablet allopurinol oral tablet 300 mg take 1 tablet (300 mg) by oral route once daily   Active    Mona Barron MD   amoxicillin-clavulanate (AUGMENTIN) 875-125 MG per tablet Take 1 tablet by mouth Every 12 (Twelve) Hours.  Patient not taking: Reported on 11/21/2023 4/18/23   Uziel Mann DO   atorvastatin (LIPITOR) 80 MG tablet Take 1 tablet by mouth Daily.    Mona Barron MD   Cyanocobalamin (VITAMIN B 12 PO) Take  by mouth.    Mona Barron MD   dicyclomine (BENTYL) 20 MG tablet Take 1 tablet by mouth Every 6 (Six) Hours.  Patient not taking: Reported on 11/21/2023 1/16/22   Johnson Galaviz APRN   empagliflozin (JARDIANCE) 25 MG tablet tablet Take  by mouth Daily.    Mona Barron MD   furosemide (LASIX) 40 MG tablet Take 0.5 tablets by mouth Daily.    Mona Barron MD   hydrOXYzine (ATARAX) 10 MG tablet Take 1 tablet by mouth 3 (Three) Times a Day As Needed for Itching.    Mona Barron MD   levothyroxine (SYNTHROID, LEVOTHROID) 200 MCG tablet Take 175 mcg by mouth Daily.    Mona Barron MD   losartan (COZAAR) 50 MG tablet Take 1 tablet by mouth Daily.    Mona Barron MD   metFORMIN (GLUCOPHAGE) 500 MG tablet Take 2 tablets by mouth 2 (Two) Times a Day With Meals. 1/16/22   Johnson Galaviz APRN   omeprazole (priLOSEC) 40 MG capsule Take 20 mg by mouth Daily.    Mona Barron MD   PARoxetine (PAXIL) 30 MG tablet Take 1 tablet by mouth Every Morning.    Mona Barron MD   Semaglutide,0.25 or 0.5MG/DOS, (Ozempic, 0.25 or 0.5 MG/DOSE,) 2 MG/1.5ML solution pen-injector Inject 0.5 mg under the skin into the appropriate area as directed 1 (One) Time Per Week. Takes once a week.    Mona Barron MD   traMADol (ULTRAM) 50 MG tablet tramadol oral tablet 50 mg take 1 tablet (50 mg) by oral route every twice daily as needed   Active  Patient not taking: Reported on 11/21/2023     "Provider, Historical, MD        Social History:   Social History     Tobacco Use    Smoking status: Former     Packs/day: 1.00     Years: 40.00     Additional pack years: 0.00     Total pack years: 40.00     Types: Cigarettes, Cigars     Quit date: 12/19/2012     Years since quitting: 10.9    Tobacco comments:     Never should have started   Vaping Use    Vaping Use: Never used   Substance Use Topics    Alcohol use: Yes     Alcohol/week: 3.0 standard drinks of alcohol     Types: 3 Shots of liquor per week     Comment: social    Drug use: Yes     Frequency: 2.0 times per week     Types: Marijuana         Review of Systems:  Review of Systems   Constitutional:  Positive for chills and fever.   HENT:  Negative for congestion, ear pain and sore throat.    Eyes:  Negative for pain.   Respiratory:  Negative for cough, chest tightness and shortness of breath.    Cardiovascular:  Negative for chest pain.   Gastrointestinal:  Negative for abdominal pain, diarrhea, nausea and vomiting.   Genitourinary:  Negative for flank pain and hematuria.   Musculoskeletal:  Negative for joint swelling.   Skin:  Positive for color change and wound. Negative for pallor.   Neurological:  Negative for seizures and headaches.   All other systems reviewed and are negative.       Physical Exam:  /71 (BP Location: Left arm, Patient Position: Lying)   Pulse 77   Temp 98.1 °F (36.7 °C) (Oral)   Resp 16   Ht 175.3 cm (69.02\")   Wt 134 kg (295 lb 3.1 oz)   SpO2 96%   BMI 43.57 kg/m²     Physical Exam    Vital signs were reviewed under triage note.  General appearance - Patient appears well-developed and well-nourished.  Patient is in no acute distress.  Head - Normocephalic, atraumatic.  Pupils - Equal, round, reactive to light.  Extraocular muscles are intact.  Conjunctiva is clear.  Nasal - Normal inspection.  No evidence of trauma or epistaxis.  Tympanic membranes - Gray, intact without erythema or retractions.  Oral mucosa - Pink and " moist without lesions or erythema.  Uvula is midline.  Chest wall - Atraumatic.  Chest wall is nontender.  There are no vesicular rashes noted.  Neck - Supple.  Trachea was midline.  There is no palpable lymphadenopathy or thyromegaly.  There are no meningeal signs  Lungs - Clear to auscultation and percussion bilaterally.  Heart - Regular rate and rhythm without any murmurs, clicks, or gallops.  Abdomen - Soft.  Bowel sounds are present.  There is no palpable tenderness.  There is no rebound, guarding, or rigidity.  There are no palpable masses.  There are no pulsatile masses.  Back - Spine is straight and midline.  There is no CVA tenderness.  Extremities - Intact x4 with full range of motion.  There is no palpable edema.  Pulses are intact x4 and equal.  Left lower anterior shin reveals an infected lower leg wound.  Neurologic - Patient is awake, alert, and oriented x3.  Cranial nerves II through XII are grossly intact.  Motor and sensory functions grossly intact.  Cerebellar function was normal.  Integument - There are no rashes.  There is obvious cellulitis involving the patient's left lower leg.  There are no petechia or purpura lesions noted.  There are no vesicular lesions noted.                         Procedures:  Procedures      Medical Decision Making:      Comorbidities that affect care:    Diabetes, hypertension, COPD, gout    External Notes reviewed:    Previous Clinic Note: Office visit with Dr. Robles dated 1/13/2023 was reviewed by me.      The following orders were placed and all results were independently analyzed by me:  Orders Placed This Encounter   Procedures    Blood Culture - Blood,    Blood Culture - Blood,    Wound Culture - Wound, Leg, Left    CT Lower Extremity Left With Contrast    Comprehensive Metabolic Panel    [place] Draw    Lactic Acid, Plasma    Sedimentation Rate    C-reactive Protein    CBC Auto Differential    STAT Lactic Acid, Reflex    Phosphorus    Magnesium     Comprehensive Metabolic Panel    CBC Auto Differential    STAT Lactic Acid, Reflex    STAT Lactic Acid, Reflex    Vancomycin, Trough    Procalcitonin    Comprehensive Metabolic Panel    Procalcitonin    CBC Auto Differential    Vancomycin, Random    Comprehensive Metabolic Panel    Procalcitonin    CBC Auto Differential    Basic Metabolic Panel    Magnesium    Phosphorus    Hepatic Function Panel    Diet: Cardiac Diets, Diabetic Diets; Healthy Heart (2-3 Na+); Consistent Carbohydrate; Texture: Regular Texture (IDDSI 7); Fluid Consistency: Thin (IDDSI 0)    Vital Signs    Intake & Output    Weigh Patient    Oral Care    Saline Lock & Maintain IV Access    Elevate Heels Off of Bed    Turn Patient    Use Seat Cushion When Up In Chair    Head of Bed 30 Degrees or Less    Skin Care    Skin Care    Remote Cardiac Monitor    Wound Care Skin Tear    Wound Care    Code Status and Medical Interventions:    Hospitalist (on-call MD unless specified)    Inpatient Case Management  Consult    POC Glucose Once    POC Glucose 4x Daily Before Meals & at Bedtime    POC Glucose Once    POC Glucose Once    POC Glucose Once    POC Glucose Once    POC Glucose Once    POC Glucose Once    POC Glucose Once    POC Glucose Once    POC Glucose Once    POC Glucose Once    POC Glucose Once    Wound Ostomy Eval & Treat    Wound Ostomy Eval & Treat    Insert Peripheral IV    Inpatient Admission    CBC & Differential    Green Top (Gel)    Lavender Top    Gold Top - SST    Light Blue Top    CBC & Differential    CBC & Differential    CBC & Differential    CBC & Differential       Medications Given in the Emergency Department:  Medications   atorvastatin (LIPITOR) tablet 80 mg (80 mg Oral Given 11/23/23 2153)   hydrOXYzine (ATARAX) tablet 30 mg (30 mg Oral Given 11/24/23 0900)   levothyroxine (SYNTHROID, LEVOTHROID) tablet 175 mcg (175 mcg Oral Given 11/24/23 0600)   pantoprazole (PROTONIX) EC tablet 40 mg (40 mg Oral Given 11/24/23  0600)   PARoxetine (PAXIL) tablet 60 mg (60 mg Oral Given 11/24/23 0856)   sodium chloride 0.9 % flush 10 mL (10 mL Intravenous Not Given 11/24/23 0857)   sodium chloride 0.9 % flush 10 mL (has no administration in time range)   sodium chloride 0.9 % infusion 40 mL (40 mL Intravenous Given 11/22/23 0614)   sennosides-docusate (PERICOLACE) 8.6-50 MG per tablet 2 tablet (1 tablet Oral Not Given 11/24/23 0856)     And   polyethylene glycol (MIRALAX) packet 17 g (has no administration in time range)     And   bisacodyl (DULCOLAX) EC tablet 5 mg (has no administration in time range)     And   bisacodyl (DULCOLAX) suppository 10 mg (has no administration in time range)   heparin (porcine) 5000 UNIT/ML injection 5,000 Units (5,000 Units Subcutaneous Given 11/24/23 0600)   Pharmacy to Dose Zosyn (has no administration in time range)   Pharmacy to dose vancomycin (has no administration in time range)   dextrose (GLUTOSE) oral gel 15 g (has no administration in time range)   dextrose (D50W) (25 g/50 mL) IV injection 25 g (has no administration in time range)   glucagon (GLUCAGEN) injection 1 mg (has no administration in time range)   ketoconazole (NIZORAL) 2 % cream 1 application  (1 application  Topical Given 11/24/23 0857)   miconazole (MICOTIN) 2 % powder 1 application  (1 application  Topical Given 11/24/23 0857)   phenylephrine-mineral oil-petrolatum (PREPARATION H) 0.25-14-74.9 % hemorhoidal ointment ( Rectal Given 11/22/23 1158)   piperacillin-tazobactam (ZOSYN) 4.5 g/100 mL 0.9% NS IVPB (mbp) (4.5 g Intravenous New Bag 11/24/23 0440)   Insulin Lispro (humaLOG) injection 2-9 Units (2 Units Subcutaneous Given 11/24/23 1252)   vancomycin 1000 mg/250 mL 0.9% NS IVPB ([facility]) (1,000 mg Intravenous New Bag 11/24/23 0158)   furosemide (LASIX) tablet 40 mg (40 mg Oral Given 11/24/23 0199)   ondansetron (ZOFRAN) injection 4 mg (4 mg Intravenous Given 11/24/23 8330)   Tetanus-Diphth-Acell Pertussis (BOOSTRIX) injection 0.5 mL (0.5  mL Intramuscular Given 11/21/23 2236)   piperacillin-tazobactam (ZOSYN) 4.5 g/100 mL 0.9% NS IVPB (mbp) (0 g Intravenous Stopped 11/22/23 0100)   vancomycin 2750 mg/500 mL 0.9% NS IVPB ([facility]) (2,750 mg Intravenous New Bag 11/22/23 0102)   sepsis fluid LR bolus 2,868 mL (2,868 mL Intravenous New Bag 11/21/23 2237)   iopamidol (ISOVUE-370) 76 % injection 100 mL (93 mL Intravenous Given 11/21/23 2151)   potassium chloride (MICRO-K/KLOR-CON) CR capsule (40 mEq Oral Given 11/23/23 1028)   furosemide (LASIX) injection 20 mg (20 mg Intravenous Given 11/23/23 1433)        ED Course:    The patient was seen and evaluated in the ED by me.  The above history and physical examination was performed as document.  Diagnostic data was obtained.  Results reviewed.  Patient was noted to meet sepsis criteria upon initial evaluation.  IV antibiotics and IV fluids were initiated.  I have consult the hospitalist service for admission.  There is no obvious drainable abscess this appears to be strictly cellulitis at this time.  A wound culture was obtained.    Sepsis criteria was met in the emergency department and the Sepsis protocol (including antibiotic administration) was initiated.      SIRS criteria considered:   1.  Temperature > 100.4 or <98.6    2.  Heart Rate > 90    3.  Respiratory Rate > 22    4.  WBC > 12K or <4K.             Severe Sepsis:     Respiratory: Mechanical Ventilation or Bipap  Hypotension: SBP > 90 or MAP < 65  Renal: Creatinine > 2  Metabolic: Lactic Acid > 2  Hematologic: Platelets < 100K or INR > 1.5  Hepatic: BILI  >  2  CNS: Sudden AMS     Septic Shock:     Severe Sepsis + Persistent hypotension or Lactic Acid > 4     Normal saline bolus, Antibiotics, and final disposition was based on these definitions.        Sepsis was recognized at 9:25 PM    Antibiotics were ordered.     30 cc/kg bolus was indicated.       Total Critical Care time of 40 minutes. Total critical care time documented does not include time  spent on separately billed procedures for services of nurses or physician assistants. I personally saw and examined the patient. I have reviewed all diagnostic interpretations and treatment plans as written. I was present for the key portions of any procedures performed and the inclusive time noted in any critical care statement. Critical care time includes patient management by me, time spent at the patients bedside,  time to review lab and imaging results, discussing patient care, documentation in the medical record, and time spent with family or caregiver.    Labs:    Lab Results (last 24 hours)       Procedure Component Value Units Date/Time    POC Glucose Once [[id number]]  (Abnormal) Collected: 11/23/23 1706    Specimen: Blood Updated: 11/23/23 1707     Glucose 217 mg/dL      Comment: Serial Number: [id number]:  [id number]       POC Glucose Once [[id number]]  (Abnormal) Collected: 11/23/23 2057    Specimen: Blood Updated: 11/23/23 2058     Glucose 272 mg/dL      Comment: Serial Number: [id number]:  [id number]       Vancomycin, Random [[id number]]  (Normal) Collected: 11/24/23 0625    Specimen: Blood from Hand, Left Updated: 11/24/23 0725     Vancomycin Random 15.50 mcg/mL     Narrative:      Therapeutic Ranges for Vancomycin    Vancomycin Random   5.0-40.0 mcg/mL  Vancomycin Trough   5.0-20.0 mcg/mL  Vancomycin Peak     20.0-40.0 mcg/mL    CBC & Differential [[id number]]  (Abnormal) Collected: 11/24/23 0625    Specimen: Blood from Hand, Left Updated: 11/24/23 0644    Narrative:      The following orders were created for panel order CBC & Differential.  Procedure                               Abnormality         Status                     ---------                               -----------         ------                     CBC Auto Differential[[id number]]        Abnormal            Final result                 Please view results for these tests on the individual orders.    Comprehensive Metabolic  "Panel [[id number]]  (Abnormal) Collected: 11/24/23 0625    Specimen: Blood from Hand, Left Updated: 11/24/23 0704     Glucose 166 mg/dL      BUN 16 mg/dL      Creatinine 1.20 mg/dL      Sodium 133 mmol/L      Potassium 3.6 mmol/L      Chloride 100 mmol/L      CO2 24.4 mmol/L      Calcium 8.0 mg/dL      Total Protein 7.5 g/dL      Albumin 2.8 g/dL      ALT (SGPT) 14 U/L      AST (SGOT) 17 U/L      Alkaline Phosphatase 81 U/L      Total Bilirubin 0.5 mg/dL      Globulin 4.7 gm/dL      A/G Ratio 0.6 g/dL      BUN/Creatinine Ratio 13.3     Anion Gap 8.6 mmol/L      eGFR 66.3 mL/min/1.73     Narrative:      GFR Normal >60  Chronic Kidney Disease <60  Kidney Failure <15      Procalcitonin [[id number]]  (Abnormal) Collected: 11/24/23 0625    Specimen: Blood from Hand, Left Updated: 11/24/23 0707     Procalcitonin 0.83 ng/mL     Narrative:      As a Marker for Sepsis (Non-Neonates):    1. <0.5 ng/mL represents a low risk of severe sepsis and/or septic shock.  2. >2 ng/mL represents a high risk of severe sepsis and/or septic shock.    As a Marker for Lower Respiratory Tract Infections that require antibiotic therapy:    PCT on Admission    Antibiotic Therapy       6-12 Hrs later    >0.5                Strongly Recommended  >0.25 - <0.5        Recommended  0.1 - 0.25          Discouraged              Remeasure/reassess PCT  <0.1                Strongly Discouraged     Remeasure/reassess PCT    As 28 day mortality risk marker: \"Change in Procalcitonin Result\" (>80% or <=80%) if Day 0 (or Day 1) and Day 4 values are available. Refer to http://www.[employer]-pct-calculator.com    Change in PCT <=80%  A decrease of PCT levels below or equal to 80% defines a positive change in PCT test result representing a higher risk for 28-day all-cause mortality of patients diagnosed with severe sepsis for septic shock.    Change in PCT >80%  A decrease of PCT levels of more than 80% defines a negative change in PCT result representing a lower risk " for 28-day all-cause mortality of patients diagnosed with severe sepsis or septic shock.    This test is Prognostic not Diagnostic, if elevated correlate with clinical findings before administering antibiotic treatment.        CBC Auto Differential [[id number]]  (Abnormal) Collected: 11/24/23 0625    Specimen: Blood from Hand, Left Updated: 11/24/23 0644     WBC 10.83 10*3/mm3      RBC 3.77 10*6/mm3      Hemoglobin 10.9 g/dL      Hematocrit 33.7 %      MCV 89.4 fL      MCH 28.9 pg      MCHC 32.3 g/dL      RDW 13.8 %      RDW-SD 44.9 fl      MPV 10.0 fL      Platelets 223 10*3/mm3      Neutrophil % 58.5 %      Lymphocyte % 24.1 %      Monocyte % 10.0 %      Eosinophil % 2.9 %      Basophil % 0.6 %      Immature Grans % 3.9 %      Neutrophils, Absolute 6.34 10*3/mm3      Lymphocytes, Absolute 2.61 10*3/mm3      Monocytes, Absolute 1.08 10*3/mm3      Eosinophils, Absolute 0.31 10*3/mm3      Basophils, Absolute 0.07 10*3/mm3      Immature Grans, Absolute 0.42 10*3/mm3      nRBC 0.0 /100 WBC     POC Glucose Once [[id number]]  (Abnormal) Collected: 11/24/23 0846    Specimen: Blood Updated: 11/24/23 0849     Glucose 168 mg/dL      Comment: Serial Number: [id number]:  [id number]       POC Glucose Once [[id number]]  (Abnormal) Collected: 11/24/23 1238    Specimen: Blood Updated: 11/24/23 1240     Glucose 196 mg/dL      Comment: Serial Number: [id number]:  [id number]                Imaging:    No Radiology Exams Resulted Within Past 24 Hours      Differential Diagnosis and Discussion:    Rash: Differential diagnosis includes but is not limited to sepsis, cellulitis, [place] Spotted Fever, meningitis, meningococcemia, Varicella, Strep infection, dermatitis, allergic reaction, Lyme disease, and toxic shock syndrome.    All labs were reviewed and interpreted by me.  CT scan radiology impression was interpreted by me.    MDM     Amount and/or Complexity of Data Reviewed  Clinical lab tests: reviewed          Critical Care Note: Total Critical Care time of 40 minutes. Total critical care time documented does not include time spent on separately billed procedures for services of nurses or physician assistants. I personally saw and examined the patient. I have reviewed all diagnostic interpretations and treatment plans as written. I was present for the key portions of any procedures performed and the inclusive time noted in any critical care statement. Critical care time includes patient management by me, time spent at the patients bedside,  time to review lab and imaging results, discussing patient care, documentation in the medical record, and time spent with family or caregiver.    Patient Care Considerations:          Consultants/Shared Management Plan:    Hospitalist: I have discussed the case with hospitalist who agrees to accept the patient for admission.    Social Determinants of Health:    Patient is independent, reliable, and has access to care.       Disposition and Care Coordination:    Admit:   Through independent evaluation of the patient's history, physical, and imperical data, the patient meets criteria for observation/admission to the hospital.        Final diagnoses:   Sepsis, due to unspecified organism, unspecified whether acute organ dysfunction present   Cellulitis of left lower extremity        ED Disposition       ED Disposition   Decision to Admit    Condition   --    Comment   Level of Care: Remote Telemetry [26]   Diagnosis: Cellulitis of left lower extremity [807601]   Certification: I Certify That Inpatient Hospital Services Are Medically Necessary For Greater Than 2 Midnights                 This medical record created using voice recognition software.             Uziel Mann DO  11/24/23 7663

## 2023-11-23 LAB
ALBUMIN SERPL-MCNC: 3 G/DL (ref 3.5–5.2)
ALBUMIN/GLOB SERPL: 0.7 G/DL
ALP SERPL-CCNC: 84 U/L (ref 39–117)
ALT SERPL W P-5'-P-CCNC: 10 U/L (ref 1–41)
ANION GAP SERPL CALCULATED.3IONS-SCNC: 12 MMOL/L (ref 5–15)
AST SERPL-CCNC: 14 U/L (ref 1–40)
BASOPHILS # BLD AUTO: 0.06 10*3/MM3 (ref 0–0.2)
BASOPHILS NFR BLD AUTO: 0.4 % (ref 0–1.5)
BILIRUB SERPL-MCNC: 0.6 MG/DL (ref 0–1.2)
BUN SERPL-MCNC: 16 MG/DL (ref 8–23)
BUN/CREAT SERPL: 13.3 (ref 7–25)
CALCIUM SPEC-SCNC: 8.1 MG/DL (ref 8.6–10.5)
CHLORIDE SERPL-SCNC: 99 MMOL/L (ref 98–107)
CO2 SERPL-SCNC: 23 MMOL/L (ref 22–29)
CREAT SERPL-MCNC: 1.2 MG/DL (ref 0.76–1.27)
DEPRECATED RDW RBC AUTO: 44.3 FL (ref 37–54)
EGFRCR SERPLBLD CKD-EPI 2021: 66.3 ML/MIN/1.73
EOSINOPHIL # BLD AUTO: 0.32 10*3/MM3 (ref 0–0.4)
EOSINOPHIL NFR BLD AUTO: 2.3 % (ref 0.3–6.2)
ERYTHROCYTE [DISTWIDTH] IN BLOOD BY AUTOMATED COUNT: 13.5 % (ref 12.3–15.4)
GLOBULIN UR ELPH-MCNC: 4.1 GM/DL
GLUCOSE BLDC GLUCOMTR-MCNC: 160 MG/DL (ref 70–99)
GLUCOSE BLDC GLUCOMTR-MCNC: 209 MG/DL (ref 70–99)
GLUCOSE BLDC GLUCOMTR-MCNC: 217 MG/DL (ref 70–99)
GLUCOSE BLDC GLUCOMTR-MCNC: 272 MG/DL (ref 70–99)
GLUCOSE SERPL-MCNC: 168 MG/DL (ref 65–99)
HCT VFR BLD AUTO: 32.8 % (ref 37.5–51)
HGB BLD-MCNC: 10.6 G/DL (ref 13–17.7)
IMM GRANULOCYTES # BLD AUTO: 0.29 10*3/MM3 (ref 0–0.05)
IMM GRANULOCYTES NFR BLD AUTO: 2.1 % (ref 0–0.5)
LYMPHOCYTES # BLD AUTO: 2.42 10*3/MM3 (ref 0.7–3.1)
LYMPHOCYTES NFR BLD AUTO: 17.4 % (ref 19.6–45.3)
MCH RBC QN AUTO: 28.8 PG (ref 26.6–33)
MCHC RBC AUTO-ENTMCNC: 32.3 G/DL (ref 31.5–35.7)
MCV RBC AUTO: 89.1 FL (ref 79–97)
MONOCYTES # BLD AUTO: 1.19 10*3/MM3 (ref 0.1–0.9)
MONOCYTES NFR BLD AUTO: 8.5 % (ref 5–12)
NEUTROPHILS NFR BLD AUTO: 69.3 % (ref 42.7–76)
NEUTROPHILS NFR BLD AUTO: 9.64 10*3/MM3 (ref 1.7–7)
NRBC BLD AUTO-RTO: 0 /100 WBC (ref 0–0.2)
PLATELET # BLD AUTO: 208 10*3/MM3 (ref 140–450)
PMV BLD AUTO: 10.2 FL (ref 6–12)
POTASSIUM SERPL-SCNC: 3.3 MMOL/L (ref 3.5–5.2)
PROCALCITONIN SERPL-MCNC: 1.31 NG/ML (ref 0–0.25)
PROT SERPL-MCNC: 7.1 G/DL (ref 6–8.5)
RBC # BLD AUTO: 3.68 10*6/MM3 (ref 4.14–5.8)
SODIUM SERPL-SCNC: 134 MMOL/L (ref 136–145)
VANCOMYCIN TROUGH SERPL-MCNC: 12.34 MCG/ML (ref 5–20)
WBC NRBC COR # BLD AUTO: 13.92 10*3/MM3 (ref 3.4–10.8)

## 2023-11-23 PROCEDURE — 87205 SMEAR GRAM STAIN: CPT | Performed by: INTERNAL MEDICINE

## 2023-11-23 PROCEDURE — 99233 SBSQ HOSP IP/OBS HIGH 50: CPT | Performed by: INTERNAL MEDICINE

## 2023-11-23 PROCEDURE — 85025 COMPLETE CBC W/AUTO DIFF WBC: CPT | Performed by: INTERNAL MEDICINE

## 2023-11-23 PROCEDURE — 25010000002 HEPARIN (PORCINE) PER 1000 UNITS: Performed by: STUDENT IN AN ORGANIZED HEALTH CARE EDUCATION/TRAINING PROGRAM

## 2023-11-23 PROCEDURE — 63710000001 INSULIN LISPRO (HUMAN) PER 5 UNITS: Performed by: PHYSICIAN ASSISTANT

## 2023-11-23 PROCEDURE — 25810000003 SODIUM CHLORIDE 0.9 % SOLUTION: Performed by: STUDENT IN AN ORGANIZED HEALTH CARE EDUCATION/TRAINING PROGRAM

## 2023-11-23 PROCEDURE — 80053 COMPREHEN METABOLIC PANEL: CPT | Performed by: INTERNAL MEDICINE

## 2023-11-23 PROCEDURE — 84145 PROCALCITONIN (PCT): CPT | Performed by: INTERNAL MEDICINE

## 2023-11-23 PROCEDURE — 25010000002 VANCOMYCIN 5 G RECONSTITUTED SOLUTION: Performed by: STUDENT IN AN ORGANIZED HEALTH CARE EDUCATION/TRAINING PROGRAM

## 2023-11-23 PROCEDURE — 82948 REAGENT STRIP/BLOOD GLUCOSE: CPT

## 2023-11-23 PROCEDURE — 25010000002 FUROSEMIDE PER 20 MG: Performed by: INTERNAL MEDICINE

## 2023-11-23 PROCEDURE — 87070 CULTURE OTHR SPECIMN AEROBIC: CPT | Performed by: INTERNAL MEDICINE

## 2023-11-23 PROCEDURE — 25010000002 PIPERACILLIN SOD-TAZOBACTAM PER 1 G: Performed by: STUDENT IN AN ORGANIZED HEALTH CARE EDUCATION/TRAINING PROGRAM

## 2023-11-23 PROCEDURE — 80202 ASSAY OF VANCOMYCIN: CPT | Performed by: STUDENT IN AN ORGANIZED HEALTH CARE EDUCATION/TRAINING PROGRAM

## 2023-11-23 RX ORDER — FUROSEMIDE 40 MG/1
40 TABLET ORAL DAILY
Status: DISCONTINUED | OUTPATIENT
Start: 2023-11-24 | End: 2023-11-26

## 2023-11-23 RX ORDER — FUROSEMIDE 10 MG/ML
20 INJECTION INTRAMUSCULAR; INTRAVENOUS ONCE
Status: COMPLETED | OUTPATIENT
Start: 2023-11-23 | End: 2023-11-23

## 2023-11-23 RX ORDER — POTASSIUM CHLORIDE 750 MG/1
40 CAPSULE, EXTENDED RELEASE ORAL ONCE
Status: COMPLETED | OUTPATIENT
Start: 2023-11-23 | End: 2023-11-23

## 2023-11-23 RX ADMIN — INSULIN LISPRO 2 UNITS: 100 INJECTION, SOLUTION INTRAVENOUS; SUBCUTANEOUS at 08:37

## 2023-11-23 RX ADMIN — HEPARIN SODIUM 5000 UNITS: 5000 INJECTION INTRAVENOUS; SUBCUTANEOUS at 21:53

## 2023-11-23 RX ADMIN — HEPARIN SODIUM 5000 UNITS: 5000 INJECTION INTRAVENOUS; SUBCUTANEOUS at 14:34

## 2023-11-23 RX ADMIN — VANCOMYCIN HYDROCHLORIDE 750 MG: 5 INJECTION, POWDER, LYOPHILIZED, FOR SOLUTION INTRAVENOUS at 00:08

## 2023-11-23 RX ADMIN — PIPERACILLIN AND TAZOBACTAM 4.5 G: 4; .5 INJECTION, POWDER, FOR SOLUTION INTRAVENOUS at 05:07

## 2023-11-23 RX ADMIN — INSULIN LISPRO 6 UNITS: 100 INJECTION, SOLUTION INTRAVENOUS; SUBCUTANEOUS at 21:54

## 2023-11-23 RX ADMIN — VANCOMYCIN HYDROCHLORIDE 1000 MG: 5 INJECTION, POWDER, LYOPHILIZED, FOR SOLUTION INTRAVENOUS at 12:56

## 2023-11-23 RX ADMIN — MICONAZOLE NITRATE 1 APPLICATION: 2 POWDER TOPICAL at 21:54

## 2023-11-23 RX ADMIN — LEVOTHYROXINE SODIUM 175 MCG: 175 TABLET ORAL at 05:07

## 2023-11-23 RX ADMIN — INSULIN LISPRO 4 UNITS: 100 INJECTION, SOLUTION INTRAVENOUS; SUBCUTANEOUS at 17:30

## 2023-11-23 RX ADMIN — ATORVASTATIN CALCIUM 80 MG: 40 TABLET, FILM COATED ORAL at 21:53

## 2023-11-23 RX ADMIN — MICONAZOLE NITRATE 1 APPLICATION: 2 POWDER TOPICAL at 08:38

## 2023-11-23 RX ADMIN — PIPERACILLIN AND TAZOBACTAM 4.5 G: 4; .5 INJECTION, POWDER, FOR SOLUTION INTRAVENOUS at 21:54

## 2023-11-23 RX ADMIN — FUROSEMIDE 20 MG: 10 INJECTION, SOLUTION INTRAMUSCULAR; INTRAVENOUS at 14:33

## 2023-11-23 RX ADMIN — INSULIN LISPRO 4 UNITS: 100 INJECTION, SOLUTION INTRAVENOUS; SUBCUTANEOUS at 12:51

## 2023-11-23 RX ADMIN — PANTOPRAZOLE SODIUM 40 MG: 40 TABLET, DELAYED RELEASE ORAL at 05:07

## 2023-11-23 RX ADMIN — POTASSIUM CHLORIDE 40 MEQ: 750 CAPSULE, EXTENDED RELEASE ORAL at 10:28

## 2023-11-23 RX ADMIN — Medication 10 ML: at 08:37

## 2023-11-23 RX ADMIN — HEPARIN SODIUM 5000 UNITS: 5000 INJECTION INTRAVENOUS; SUBCUTANEOUS at 05:07

## 2023-11-23 RX ADMIN — Medication 10 ML: at 21:54

## 2023-11-23 RX ADMIN — KETOCONAZOLE 1 APPLICATION: 20 CREAM TOPICAL at 08:38

## 2023-11-23 RX ADMIN — PIPERACILLIN AND TAZOBACTAM 4.5 G: 4; .5 INJECTION, POWDER, FOR SOLUTION INTRAVENOUS at 12:51

## 2023-11-23 RX ADMIN — HYDROXYZINE HYDROCHLORIDE 30 MG: 10 TABLET ORAL at 08:37

## 2023-11-23 RX ADMIN — PAROXETINE HYDROCHLORIDE 60 MG: 20 TABLET, FILM COATED ORAL at 08:38

## 2023-11-23 NOTE — PROGRESS NOTES
"[facility] Clinical Pharmacy Services: Vancomycin Monitoring Note    Raymond M Jr Damico is a 68 y.o. male who is on day  of pharmacy to dose vancomycin for Sepsis and Skin and Soft Tissue.    Previous Vancomycin Dose:   750 mg IV every  12  hours  Imaging Reviewed?: NA  Updated Cultures and Sensitivities:   23: BLOOD CX, RIGHT ARM: NGD1  23: BLOOD CX, RIGHT ARM: NGD1    Vitals/Labs  Ht: 175.3 cm (69.02\"); Wt: 134 kg (295 lb 3.1 oz)     Temp (24hrs), Av.5 °F (36.9 °C), Min:97.5 °F (36.4 °C), Max:99.1 °F (37.3 °C)    Estimated Creatinine Clearance: 81.1 mL/min (by C-G formula based on SCr of 1.2 mg/dL).       Results from last 7 days   Lab Units 23  1114 23  0523 23  0520 23   VANCOMYCIN TR mcg/mL 12.34  --   --   --    CREATININE mg/dL  --  1.20 1.27 1.31*   WBC 10*3/mm3  --  13.92* 13.31* 16.15*     Assessment/Plan    Current Vancomycin Dose:  750 mg IV every 12 hours; which provides the following predicted parameters:  AUC24,ss: 365 mg/L.hr  PAUC*: 33 %  Ctrough,ss: 8.8 mg/L  Pconc*: 0 %  Tox.: 5 %  Low AUC: will change to 1000 mg IV every 12 hours for: AUC24,ss: 486 mg/L.hr, Ctrough,ss: 11.8 mg/L, and Tox.: 7 %  Next Vanc Random ordered for tomorrow at 0600  We will continue to monitor patient changes and renal function     Thank you for involving pharmacy in this patient's care. Please contact pharmacy with any questions or concerns.    Jo Ann Reynoso  Clinical Pharmacist    "

## 2023-11-23 NOTE — PROGRESS NOTES
[facility]   Hospitalist Progress Note  Date: 2023  Patient Name: Raymond M Jr Damico  : 1955  MRN: [id number]  Date of admission: 2023  Room/Bed: [facility]      Subjective   Subjective     Chief Complaint: leg cellulitis    Summary:Raymond M Jr Damico is a 68 y.o. male with hypertension, hypothyroidism, diabetes presented with left leg erythema swelling and fever.  10 days prior he had scraped his left shin superficially with a piece of wood.  The day prior to admission he began having erythema and was brought to the ED and was diagnosed with cellulitis and placed on IV abx.    Interval Followup:  Patient reports leg is less painful and slightly less swollen today but still red; denies f/c    Review of Systems    All systems reviewed and negative except for what is outlined above.      Objective   Objective     Vitals:   Temp:  [97.5 °F (36.4 °C)-99.1 °F (37.3 °C)] 99.1 °F (37.3 °C)  Heart Rate:  [80-92] 80  Resp:  [16-18] 18  BP: (127-149)/(70-78) 135/77    Physical Exam   General: Awake, alert, NAD  HENT: NCAT, MMM  Eyes: pupils equal, no scleral icterus  Cardiovascular: RRR, no murmurs   Pulmonary: CTA bilaterally; no wheezes; no conversational dyspnea  Gastrointestinal: S/ND/NT, +BS  Musculoskeletal: No gross deformities  Skin: erythematous left leg, still red but has receded some from yesterday; swelling persists but slightly improved  Neuro: CN II through XII grossly intact; speech clear; no tremor  Psych: Mood and affect appropriate  : No Rivera catheter; no suprapubic tenderness    Result Review    Result Review:  I have personally reviewed these results on 2023    [x]  Laboratory      Lab 23  0523 23  0842 23  0520 23  0101 23   WBC 13.92*  --  13.31*  --  16.15*   HEMOGLOBIN 10.6*  --  11.4*  --  13.0   HEMATOCRIT 32.8*  --  35.7*  --  39.2   PLATELETS 208  --  183  --  241   NEUTROS ABS 9.64*  --  10.40*  --  13.21*   IMMATURE GRANS (ABS)  0.29*  --  0.12*  --  0.15*   LYMPHS ABS 2.42  --  1.80  --  1.61   MONOS ABS 1.19*  --  0.89  --  1.06*   EOS ABS 0.32  --  0.05  --  0.05   MCV 89.1  --  90.2  --  88.9   SED RATE  --   --   --   --  62*   CRP  --   --   --   --  16.64*   PROCALCITONIN 1.31*  --  1.80*  --   --    LACTATE  --  1.5 2.2* 2.4* 2.2*         Lab 11/23/23  0523 11/22/23 0520 11/21/23 2020   SODIUM 134* 133* 137   POTASSIUM 3.3* 3.6 3.8   CHLORIDE 99 100 100   CO2 23.0 22.0 23.3   ANION GAP 12.0 11.0 13.7   BUN 16 13 13   CREATININE 1.20 1.27 1.31*   EGFR 66.3 61.9 59.7*   GLUCOSE 168* 297* 231*   CALCIUM 8.1* 8.1* 8.5*   MAGNESIUM  --  1.5*  --    PHOSPHORUS  --  2.4*  --          Lab 11/23/23 0523 11/22/23 0520 11/21/23 2020   TOTAL PROTEIN 7.1 7.3 8.3   ALBUMIN 3.0* 3.2* 3.9   GLOBULIN 4.1 4.1 4.4   ALT (SGPT) 10 13 16   AST (SGOT) 14 15 18   BILIRUBIN 0.6 0.9 0.9   ALK PHOS 84 76 90                     Brief Urine Lab Results       None          [x]  Microbiology   Microbiology Results (last 10 days)       Procedure Component Value - Date/Time    Wound Culture - Wound, Leg, Left [[id number]] Collected: 11/23/23 1029    Lab Status: Preliminary result Specimen: Wound from Leg, Left Updated: 11/23/23 1222     Gram Stain No WBCs or organisms seen    Blood Culture - Blood, Arm, Right [[id number]]  (Normal) Collected: 11/21/23 2055    Lab Status: Preliminary result Specimen: Blood from Arm, Right Updated: 11/22/23 2215     Blood Culture No growth at 24 hours    Blood Culture - Blood, Arm, Right [[id number]]  (Normal) Collected: 11/21/23 2020    Lab Status: Preliminary result Specimen: Blood from Arm, Right Updated: 11/22/23 2030     Blood Culture No growth at 24 hours          [x]  Radiology  CT Lower Extremity Left With Contrast    Result Date: 11/21/2023    1. 2 mm calcification or foreign body in the soft tissues anterior to the mid to distal tibia. 2. Lower leg subcutaneous edema and skin thickening, which may be due to cellulitis.   Ill-defined fluid in the subcutaneous fat of the lateral lower leg, but no rim enhancing fluid collection is seen. 3. Tricompartmental arthritic changes of the knee and partially included suprapatellar joint effusion.     ALHAJI MARTINEZ MD       Electronically Signed and Approved By: ALHAJI MARTINEZ MD on 11/21/2023 at 22:32            []  EKG/Telemetry   []  Cardiology/Vascular   []  Pathology  []  Old records  []  Other:    Assessment & Plan   Assessment / Plan     Assessment:  Sepsis, present on admission-fever, elevated white count  Cellulitis of the left lower extremity  Mildly elevated creatinine  Elevated lactic acid   Superficial skin cuts from the rabbit nails of the right hand  Intertrigo of the abdominal folds and groin  Hypertension  Hypothyroidism  Type 2 diabetes mellitus  Gout  Hyperlipidemia      Plan:  Admitted to Medicine  IV abx  Topical antifungal  Will give a dose of Lasix  Replace potassium  Recheck labs in am on .tomorrow       Discussed case and plan with RN on 11/23/2023      DVT prophylaxis:  Medical DVT prophylaxis orders are present.    CODE STATUS:   Level Of Support Discussed With: Patient  Code Status (Patient has no pulse and is not breathing): CPR (Attempt to Resuscitate)  Medical Interventions (Patient has pulse or is breathing): Full Support    Electronically signed by Nik Quinn MD, 11/23/23, 1:45 PM EST.

## 2023-11-23 NOTE — PROGRESS NOTES
[facility]   Hospitalist Progress Note  Date: 2023  Patient Name: Raymond M Jr Damico  : 1955  MRN: [id number]  Date of admission: 2023  Room/Bed: [facility] [place]note 2[place]      Subjective   Subjective     Chief Complaint: leg cellulitis    Summary:Raymond M Jr Damico is a 68 y.o. male with hypertension, hypothyroidism, diabetes presented with left leg erythema swelling and fever.  10 days prior he had scraped his left shin superficially with a piece of wood.  The day prior to admission he began having erythema and was brought to the ED and was diagnosed with cellulitis and placed on IV abx.    Interval Followup: Leg erythema a little better.    Review of Systems    All systems reviewed and negative except for what is outlined above.      Objective   Objective     Vitals:   Temp:  [98.1 °F (36.7 °C)-99.8 °F (37.7 °C)] 98.2 °F (36.8 °C)  Heart Rate:  [] 91  Resp:  [16-20] 16  BP: (122-163)/(50-86) 149/76    Physical Exam   General: Awake, alert, NAD  HENT: NCAT, MMM  Eyes: pupils equal, no scleral icterus  Cardiovascular: RRR, no murmurs   Pulmonary: CTA bilaterally; no wheezes; no conversational dyspnea  Gastrointestinal: S/ND/NT, +BS  Musculoskeletal: No gross deformities  Skin: erythematous left leg, receded from outline; yeast in skin folds  Neuro: CN II through XII grossly intact; speech clear; no tremor  Psych: Mood and affect appropriate  : No Rivera catheter; no suprapubic tenderness    Result Review    Result Review:  I have personally reviewed these results:  [x]  Laboratory      Lab 23  0842 23  0520 23  0101 23   WBC  --  13.31*  --  16.15*   HEMOGLOBIN  --  11.4*  --  13.0   HEMATOCRIT  --  35.7*  --  39.2   PLATELETS  --  183  --  241   NEUTROS ABS  --  10.40*  --  13.21*   IMMATURE GRANS (ABS)  --  0.12*  --  0.15*   LYMPHS ABS  --  1.80  --  1.61   MONOS ABS  --  0.89  --  1.06*   EOS ABS  --  0.05  --  0.05   MCV  --  90.2  --  88.9   SED RATE  --   --   --   62*   CRP  --   --   --  16.64*   PROCALCITONIN  --  1.80*  --   --    LACTATE 1.5 2.2* 2.4* 2.2*         Lab 11/22/23 0520 11/21/23 2020   SODIUM 133* 137   POTASSIUM 3.6 3.8   CHLORIDE 100 100   CO2 22.0 23.3   ANION GAP 11.0 13.7   BUN 13 13   CREATININE 1.27 1.31*   EGFR 61.9 59.7*   GLUCOSE 297* 231*   CALCIUM 8.1* 8.5*   MAGNESIUM 1.5*  --    PHOSPHORUS 2.4*  --          Lab 11/22/23 0520 11/21/23 2020   TOTAL PROTEIN 7.3 8.3   ALBUMIN 3.2* 3.9   GLOBULIN 4.1 4.4   ALT (SGPT) 13 16   AST (SGOT) 15 18   BILIRUBIN 0.9 0.9   ALK PHOS 76 90                     Brief Urine Lab Results       None          [x]  Microbiology   Microbiology Results (last 10 days)       ** No results found for the last 240 hours. **          [x]  Radiology  CT Lower Extremity Left With Contrast    Result Date: 11/21/2023    1. 2 mm calcification or foreign body in the soft tissues anterior to the mid to distal tibia. 2. Lower leg subcutaneous edema and skin thickening, which may be due to cellulitis.  Ill-defined fluid in the subcutaneous fat of the lateral lower leg, but no rim enhancing fluid collection is seen. 3. Tricompartmental arthritic changes of the knee and partially included suprapatellar joint effusion.     ALHAJI MARTINEZ MD       Electronically Signed and Approved By: ALHAJI MARTINEZ MD on 11/21/2023 at 22:32            []  EKG/Telemetry   []  Cardiology/Vascular   []  Pathology  []  Old records  []  Other:    Assessment & Plan   Assessment / Plan     Assessment:  Sepsis, present on admission-fever, elevated white count  Cellulitis of the left lower extremity  Mildly elevated creatinine  Elevated lactic acid   Superficial skin cuts from the rabbit nails of the right hand  Intertrigo of the abdominal folds and groin  Hypertension  Hypothyroidism  Type 2 diabetes mellitus  Gout  Hyperlipidemia      Plan:  Admitted to Medicine  IV abx  Topical antifungal       Discussed case and plan with RN on 11/22/2023      DVT  prophylaxis:  Medical DVT prophylaxis orders are present.    CODE STATUS:   Level Of Support Discussed With: Patient  Code Status (Patient has no pulse and is not breathing): CPR (Attempt to Resuscitate)  Medical Interventions (Patient has pulse or is breathing): Full Support      Electronically signed by Nik Quinn MD, 11/22/23, 7:16 PM EST.

## 2023-11-23 NOTE — CASE MANAGEMENT/SOCIAL WORK
Discharge Planning Assessment   Rina     Patient Name: Raymond M Jr Damico  MRN: [id number]  Today's Date: 11/23/2023    Admit Date: 11/21/2023    Plan: Pt lives with wife, Pt has good support at home. PCP: JEANNE Anderson, Pt has V.A benefits and MCR. Wife plans for Pt to return home but does have concerns about his leg, if it does not get better and Pt is unable to help with transfers at home Pt may need InPt rehab. Wife denies any fin. stressors at this time. SW will continue to follow for needs.   Discharge Needs Assessment       Row Name 11/23/23 1352       Living Environment    People in Home spouse;child(rock), dependent    Current Living Arrangements home    Potentially Unsafe Housing Conditions none    In the past 12 months has the electric, gas, oil, or water company threatened to shut off services in your home? No    Primary Care Provided by self    Provides Primary Care For no one    Family Caregiver if Needed spouse    Quality of Family Relationships helpful;involved;supportive    Able to Return to Prior Arrangements yes       Resource/Environmental Concerns    Resource/Environmental Concerns none    Transportation Concerns none       Food Insecurity    Within the past 12 months, you worried that your food would run out before you got the money to buy more. Never true    Within the past 12 months, the food you bought just didn't last and you didn't have money to get more. Never true       Transition Planning    Patient/Family Anticipates Transition to home with family;inpatient rehabilitation facility    Transportation Anticipated family or friend will provide       Discharge Needs Assessment    Readmission Within the Last 30 Days no previous admission in last 30 days    Equipment Currently Used at Home cpap;walker, rolling;glucometer;wheelchair    Concerns to be Addressed discharge planning    Anticipated Changes Related to Illness none    Equipment Needed After Discharge none    Discharge  Coordination/Progress Pt lives with wife, Pt has good support at home. PCP: JEANNE Anderson, Pt has V.A benefits and MCR. Wife plans for Pt to return home but does have concerns about his leg, if it does not get better and Pt is unable to help with transfers at home Pt may need InPt rehab. Wife denies any fin. stressors at this time. SW will continue to follow for needs.                   Discharge Plan       Row Name 11/23/23 1358       Plan    Plan Pt lives with wife, Pt has good support at home. PCP: JEANNE Anderson, Pt has V.A benefits and MCR. Wife plans for Pt to return home but does have concerns about his leg, if it does not get better and Pt is unable to help with transfers at home Pt may need InPt rehab. Wife denies any fin. stressors at this time. SW will continue to follow for needs.                  Continued Care and Services - Admitted Since 11/21/2023    Coordination has not been started for this encounter.       Expected Discharge Date and Time       Expected Discharge Date Expected Discharge Time    Nov 24, 2023            Demographic Summary       Row Name 11/23/23 1349       General Information    Admission Type inpatient    Arrived From emergency department    Referral Source admission list    Reason for Consult discharge planning    Preferred Language English       Contact Information    Permission Granted to Share Info With lay caregiver    Contact Information Obtained for lay caregiver       Lay Caregiver Information    Name, Lay Caregiver Susan damico    Phone, Lay Caregiver [phone]                   Functional Status       Row Name 11/23/23 1351       Functional Status    Usual Activity Tolerance moderate    Current Activity Tolerance moderate       Physical Activity    On average, how many days per week do you engage in moderate to strenuous exercise (like a brisk walk)? 0 days    On average, how many minutes do you engage in exercise at this level? 0 min    Number of minutes of exercise per week  0       Assessment of Health Literacy    How often do you have someone help you read hospital materials? Never    How often do you have problems learning about your medical condition because of difficulty understanding written information? Never    How often do you have a problem understanding what is told to you about your medical condition? Never    How confident are you filling out medical forms by yourself? Extremely    Health Literacy Excellent       Functional Status, IADL    Medications independent    Meal Preparation independent;assistive person    Housekeeping independent;assistive person    Laundry independent;assistive person    Shopping independent;assistive person       Mental Status    General Appearance WDL WDL       Mental Status Summary    Recent Changes in Mental Status/Cognitive Functioning no changes       Employment/    Employment Status retired                   Psychosocial    No documentation.                  Abuse/Neglect    No documentation.                  Legal       Row Name 11/23/23 0414       Financial Resource Strain    How hard is it for you to pay for the very basics like food, housing, medical care, and heating? Not hard       Financial/Legal    Source of Income social security;pension/FDC    Application for Public Assistance not applied       Legal    Criminal Activity/Legal Involvement none                   Substance Abuse    No documentation.                  Patient Forms    No documentation.                     Jillian Preston

## 2023-11-24 LAB
ALBUMIN SERPL-MCNC: 2.8 G/DL (ref 3.5–5.2)
ALBUMIN/GLOB SERPL: 0.6 G/DL
ALP SERPL-CCNC: 81 U/L (ref 39–117)
ALT SERPL W P-5'-P-CCNC: 14 U/L (ref 1–41)
ANION GAP SERPL CALCULATED.3IONS-SCNC: 8.6 MMOL/L (ref 5–15)
AST SERPL-CCNC: 17 U/L (ref 1–40)
BASOPHILS # BLD AUTO: 0.07 10*3/MM3 (ref 0–0.2)
BASOPHILS NFR BLD AUTO: 0.6 % (ref 0–1.5)
BILIRUB SERPL-MCNC: 0.5 MG/DL (ref 0–1.2)
BUN SERPL-MCNC: 16 MG/DL (ref 8–23)
BUN/CREAT SERPL: 13.3 (ref 7–25)
CALCIUM SPEC-SCNC: 8 MG/DL (ref 8.6–10.5)
CHLORIDE SERPL-SCNC: 100 MMOL/L (ref 98–107)
CO2 SERPL-SCNC: 24.4 MMOL/L (ref 22–29)
CREAT SERPL-MCNC: 1.2 MG/DL (ref 0.76–1.27)
DEPRECATED RDW RBC AUTO: 44.9 FL (ref 37–54)
EGFRCR SERPLBLD CKD-EPI 2021: 66.3 ML/MIN/1.73
EOSINOPHIL # BLD AUTO: 0.31 10*3/MM3 (ref 0–0.4)
EOSINOPHIL NFR BLD AUTO: 2.9 % (ref 0.3–6.2)
ERYTHROCYTE [DISTWIDTH] IN BLOOD BY AUTOMATED COUNT: 13.8 % (ref 12.3–15.4)
GLOBULIN UR ELPH-MCNC: 4.7 GM/DL
GLUCOSE BLDC GLUCOMTR-MCNC: 168 MG/DL (ref 70–99)
GLUCOSE BLDC GLUCOMTR-MCNC: 177 MG/DL (ref 70–99)
GLUCOSE BLDC GLUCOMTR-MCNC: 196 MG/DL (ref 70–99)
GLUCOSE BLDC GLUCOMTR-MCNC: 248 MG/DL (ref 70–99)
GLUCOSE SERPL-MCNC: 166 MG/DL (ref 65–99)
HCT VFR BLD AUTO: 33.7 % (ref 37.5–51)
HGB BLD-MCNC: 10.9 G/DL (ref 13–17.7)
IMM GRANULOCYTES # BLD AUTO: 0.42 10*3/MM3 (ref 0–0.05)
IMM GRANULOCYTES NFR BLD AUTO: 3.9 % (ref 0–0.5)
LYMPHOCYTES # BLD AUTO: 2.61 10*3/MM3 (ref 0.7–3.1)
LYMPHOCYTES NFR BLD AUTO: 24.1 % (ref 19.6–45.3)
MCH RBC QN AUTO: 28.9 PG (ref 26.6–33)
MCHC RBC AUTO-ENTMCNC: 32.3 G/DL (ref 31.5–35.7)
MCV RBC AUTO: 89.4 FL (ref 79–97)
MONOCYTES # BLD AUTO: 1.08 10*3/MM3 (ref 0.1–0.9)
MONOCYTES NFR BLD AUTO: 10 % (ref 5–12)
NEUTROPHILS NFR BLD AUTO: 58.5 % (ref 42.7–76)
NEUTROPHILS NFR BLD AUTO: 6.34 10*3/MM3 (ref 1.7–7)
NRBC BLD AUTO-RTO: 0 /100 WBC (ref 0–0.2)
PLATELET # BLD AUTO: 223 10*3/MM3 (ref 140–450)
PMV BLD AUTO: 10 FL (ref 6–12)
POTASSIUM SERPL-SCNC: 3.6 MMOL/L (ref 3.5–5.2)
PROCALCITONIN SERPL-MCNC: 0.83 NG/ML (ref 0–0.25)
PROT SERPL-MCNC: 7.5 G/DL (ref 6–8.5)
RBC # BLD AUTO: 3.77 10*6/MM3 (ref 4.14–5.8)
SODIUM SERPL-SCNC: 133 MMOL/L (ref 136–145)
VANCOMYCIN SERPL-MCNC: 15.5 MCG/ML (ref 5–40)
WBC NRBC COR # BLD AUTO: 10.83 10*3/MM3 (ref 3.4–10.8)

## 2023-11-24 PROCEDURE — 82948 REAGENT STRIP/BLOOD GLUCOSE: CPT

## 2023-11-24 PROCEDURE — 99233 SBSQ HOSP IP/OBS HIGH 50: CPT | Performed by: FAMILY MEDICINE

## 2023-11-24 PROCEDURE — 84145 PROCALCITONIN (PCT): CPT | Performed by: INTERNAL MEDICINE

## 2023-11-24 PROCEDURE — 25010000002 PIPERACILLIN SOD-TAZOBACTAM PER 1 G: Performed by: STUDENT IN AN ORGANIZED HEALTH CARE EDUCATION/TRAINING PROGRAM

## 2023-11-24 PROCEDURE — 25010000002 VANCOMYCIN 5 G RECONSTITUTED SOLUTION: Performed by: STUDENT IN AN ORGANIZED HEALTH CARE EDUCATION/TRAINING PROGRAM

## 2023-11-24 PROCEDURE — 25010000002 ONDANSETRON PER 1 MG: Performed by: PHYSICIAN ASSISTANT

## 2023-11-24 PROCEDURE — 25010000002 HEPARIN (PORCINE) PER 1000 UNITS: Performed by: STUDENT IN AN ORGANIZED HEALTH CARE EDUCATION/TRAINING PROGRAM

## 2023-11-24 PROCEDURE — 85025 COMPLETE CBC W/AUTO DIFF WBC: CPT | Performed by: INTERNAL MEDICINE

## 2023-11-24 PROCEDURE — 63710000001 INSULIN LISPRO (HUMAN) PER 5 UNITS: Performed by: PHYSICIAN ASSISTANT

## 2023-11-24 PROCEDURE — 80202 ASSAY OF VANCOMYCIN: CPT | Performed by: STUDENT IN AN ORGANIZED HEALTH CARE EDUCATION/TRAINING PROGRAM

## 2023-11-24 PROCEDURE — 25810000003 SODIUM CHLORIDE 0.9 % SOLUTION: Performed by: STUDENT IN AN ORGANIZED HEALTH CARE EDUCATION/TRAINING PROGRAM

## 2023-11-24 PROCEDURE — 80053 COMPREHEN METABOLIC PANEL: CPT | Performed by: INTERNAL MEDICINE

## 2023-11-24 RX ORDER — ONDANSETRON 2 MG/ML
4 INJECTION INTRAMUSCULAR; INTRAVENOUS EVERY 4 HOURS PRN
Status: DISCONTINUED | OUTPATIENT
Start: 2023-11-24 | End: 2023-12-01 | Stop reason: HOSPADM

## 2023-11-24 RX ADMIN — Medication 10 ML: at 20:57

## 2023-11-24 RX ADMIN — HYDROXYZINE HYDROCHLORIDE 30 MG: 10 TABLET ORAL at 09:00

## 2023-11-24 RX ADMIN — PIPERACILLIN AND TAZOBACTAM 4.5 G: 4; .5 INJECTION, POWDER, FOR SOLUTION INTRAVENOUS at 20:57

## 2023-11-24 RX ADMIN — INSULIN LISPRO 2 UNITS: 100 INJECTION, SOLUTION INTRAVENOUS; SUBCUTANEOUS at 08:55

## 2023-11-24 RX ADMIN — HEPARIN SODIUM 5000 UNITS: 5000 INJECTION INTRAVENOUS; SUBCUTANEOUS at 23:40

## 2023-11-24 RX ADMIN — MICONAZOLE NITRATE 1 APPLICATION: 2 POWDER TOPICAL at 08:57

## 2023-11-24 RX ADMIN — INSULIN LISPRO 2 UNITS: 100 INJECTION, SOLUTION INTRAVENOUS; SUBCUTANEOUS at 12:52

## 2023-11-24 RX ADMIN — INSULIN LISPRO 2 UNITS: 100 INJECTION, SOLUTION INTRAVENOUS; SUBCUTANEOUS at 20:56

## 2023-11-24 RX ADMIN — VANCOMYCIN HYDROCHLORIDE 1000 MG: 5 INJECTION, POWDER, LYOPHILIZED, FOR SOLUTION INTRAVENOUS at 14:35

## 2023-11-24 RX ADMIN — MICONAZOLE NITRATE 1 APPLICATION: 2 POWDER TOPICAL at 20:57

## 2023-11-24 RX ADMIN — DOCUSATE SODIUM 50MG AND SENNOSIDES 8.6MG 2 TABLET: 8.6; 5 TABLET, FILM COATED ORAL at 20:56

## 2023-11-24 RX ADMIN — INSULIN LISPRO 4 UNITS: 100 INJECTION, SOLUTION INTRAVENOUS; SUBCUTANEOUS at 17:23

## 2023-11-24 RX ADMIN — PAROXETINE HYDROCHLORIDE 60 MG: 20 TABLET, FILM COATED ORAL at 08:56

## 2023-11-24 RX ADMIN — HEPARIN SODIUM 5000 UNITS: 5000 INJECTION INTRAVENOUS; SUBCUTANEOUS at 15:53

## 2023-11-24 RX ADMIN — HEPARIN SODIUM 5000 UNITS: 5000 INJECTION INTRAVENOUS; SUBCUTANEOUS at 06:00

## 2023-11-24 RX ADMIN — FUROSEMIDE 40 MG: 40 TABLET ORAL at 08:56

## 2023-11-24 RX ADMIN — LEVOTHYROXINE SODIUM 175 MCG: 175 TABLET ORAL at 06:00

## 2023-11-24 RX ADMIN — PIPERACILLIN AND TAZOBACTAM 4.5 G: 4; .5 INJECTION, POWDER, FOR SOLUTION INTRAVENOUS at 14:29

## 2023-11-24 RX ADMIN — ATORVASTATIN CALCIUM 80 MG: 40 TABLET, FILM COATED ORAL at 20:56

## 2023-11-24 RX ADMIN — KETOCONAZOLE 1 APPLICATION: 20 CREAM TOPICAL at 08:57

## 2023-11-24 RX ADMIN — PANTOPRAZOLE SODIUM 40 MG: 40 TABLET, DELAYED RELEASE ORAL at 06:00

## 2023-11-24 RX ADMIN — PIPERACILLIN AND TAZOBACTAM 4.5 G: 4; .5 INJECTION, POWDER, FOR SOLUTION INTRAVENOUS at 04:40

## 2023-11-24 RX ADMIN — ONDANSETRON 4 MG: 2 INJECTION INTRAMUSCULAR; INTRAVENOUS at 06:33

## 2023-11-24 RX ADMIN — VANCOMYCIN HYDROCHLORIDE 1000 MG: 5 INJECTION, POWDER, LYOPHILIZED, FOR SOLUTION INTRAVENOUS at 01:58

## 2023-11-24 NOTE — SIGNIFICANT NOTE
Wound Eval / Progress Noted    ROSI Flynn     Patient Name: Raymond M Jr Damico  : 1955  MRN: [id number]  Today's Date: 2023                 Admit Date: 2023    Visit Dx:    ICD-10-CM ICD-9-CM   1. Sepsis, due to unspecified organism, unspecified whether acute organ dysfunction present  A41.9 038.9     995.91   2. Cellulitis of left lower extremity  L03.116 682.6         Cellulitis of left lower extremity        Past Medical History:   Diagnosis Date    Callus     COPD (chronic obstructive pulmonary disease)     Diabetes mellitus     Gout     Hypertension         Past Surgical History:   Procedure Laterality Date    CARPAL TUNNEL RELEASE      EYE SURGERY      JOINT REPLACEMENT           Physical Assessment:  Wound 23 0150 Left lower leg Other (comment) (Active)   Dressing Appearance moist drainage;intact 23 1113   Closure None 23 1113   Base moist;red 23 1113   Periwound edematous;redness;blistered;intact 23 1113   Periwound Temperature warm 23 1113   Periwound Skin Turgor soft 23 1113   Edges open 23 1113   Drainage Characteristics/Odor serosanguineous;serous 23 1113   Drainage Amount moderate 23 1113      Wound Check / Follow-up: Patient seen today for wound re-consult regarding LLE. Patient remains with open wound to left anterior lower leg with edema and erythema to left lower leg. Patient now with blistering noted to medial and posterior aspect of left lower leg. Dressing to be changed by primary RN after patient showers today. Recommending application of non-adherent petroleum based gauze to blistering and continue silver impregnated hydrofiber to open wound. Continue other current wound and skin care.       Impression: Edema, erythema, blistering, wound to LLE.      Short term goals: Regain skin integrity, skin protection, moisture prevention, daily dressing changes.      Christel Henning RN    2023    13:46 EST

## 2023-11-24 NOTE — PROGRESS NOTES
"[facility] Clinical Pharmacy Services: Vancomycin Monitoring Note    Raymond M Jr Damico is a 68 y.o. male who is on day 3 of pharmacy to dose vancomycin for Sepsis and Skin and Soft Tissue.    Previous Vancomycin Dose:   1000 mg IV every  12  hours  Imaging Reviewed?: N/A  Updated Cultures and Sensitivities:   23: WOUND CX, LEFT LEG: NO ORGANISMS SEEN  23: BLOOD CX, RIGHT ARM: NGD2  23: BLOOD CX, RIGHT ARM: NGD2    Vitals/Labs  Ht: 175.3 cm (69.02\"); Wt: 134 kg (295 lb 3.1 oz)     Temp (24hrs), Av.6 °F (37 °C), Min:98.1 °F (36.7 °C), Max:99.1 °F (37.3 °C)    Estimated Creatinine Clearance: 81.1 mL/min (by C-G formula based on SCr of 1.2 mg/dL).       Results from last 7 days   Lab Units 23  0625 23  1114 23  0523 23  0520   VANCOMYCIN RM mcg/mL 15.50  --   --   --    VANCOMYCIN TR mcg/mL  --  12.34  --   --    CREATININE mg/dL 1.20  --  1.20 1.27   WBC 10*3/mm3 10.83*  --  13.92* 13.31*     Assessment/Plan    Current Vancomycin Dose:  1000 mg IV every 12 hours; which provides the following predicted parameters:  AUC24,ss: 477 mg/L.hr  PAUC*: 71 %  Ctrough,ss: 17.4 mg/L  Pconc*: 37 %  Tox.: 13 %  No change in dose needed  We will continue to monitor patient changes and renal function     Thank you for involving pharmacy in this patient's care. Please contact pharmacy with any questions or concerns.    Jo Ann Reynoso  Clinical Pharmacist    "

## 2023-11-24 NOTE — PLAN OF CARE
Goal Outcome Evaluation: treatment done per orders. Patient pleasant and cooperative

## 2023-11-24 NOTE — PLAN OF CARE
Goal Outcome Evaluation:  Plan of Care Reviewed With: patient        Progress: no change  Outcome Evaluation: Pt remained A&Ox4 this shift. Also, pt remained on room air maintaining stable oxygen saturation. Pt denied pain this shift. Wound/skin care completed as per order. New wound consult placed. Updated and new photos obtained this shift of wounds on left lower extermity. Wound culture obtained as per order. Pt tolerated well. All other vital signs remained stable.

## 2023-11-24 NOTE — PLAN OF CARE
Goal Outcome Evaluation:   Patient a&ox4. No complaints per the patient this shift, sleeping in between care.

## 2023-11-24 NOTE — PROGRESS NOTES
[facility]   Hospitalist Progress Note  Date: 2023  Patient Name: Raymond M Jr Damico  : 1955  MRN: [id number]  Date of admission: 2023  Room/Bed: [facility]      Subjective   Subjective     Chief Complaint: leg cellulitis    Summary:Raymond M Jr Damico is a 68 y.o. male with hypertension, hypothyroidism, diabetes presented with left leg erythema swelling and fever.  10 days prior he had scraped his left shin superficially with a piece of wood.  The day prior to admission he began having erythema and was brought to the ED and was diagnosed with cellulitis and placed on IV abx.    Interval Followup: Patient seen and examined sitting upright at bedside lower extremity still very erythematous pain improving continue with IV antibiotics continuing with topical wound care likely will be slow to resolve.      All systems reviewed and negative except: Generalized weakness fatigue leg pain      Objective   Objective     Vitals:   Temp:  [98.1 °F (36.7 °C)-98.7 °F (37.1 °C)] 98.1 °F (36.7 °C)  Heart Rate:  [72-92] 77  Resp:  [16-18] 16  BP: (113-149)/(66-78) 113/71    Physical Exam   General: Awake, alert, NAD  HENT: NCAT, MMM  Eyes: pupils equal, no scleral icterus  Cardiovascular: RRR, no murmurs   Pulmonary: CTA bilaterally; no wheezes; no conversational dyspnea  Gastrointestinal: S/ND/NT, +BS  Skin: Very edematous, erythematous painful lower extremity     Result Review    Result Review:  I have personally reviewed these results on 2023    [x]  Laboratory      Lab 23  0625 23  0523 23  0842 23  0520 23  0101 23   WBC 10.83* 13.92*  --  13.31*  --  16.15*   HEMOGLOBIN 10.9* 10.6*  --  11.4*  --  13.0   HEMATOCRIT 33.7* 32.8*  --  35.7*  --  39.2   PLATELETS 223 208  --  183  --  241   NEUTROS ABS 6.34 9.64*  --  10.40*  --  13.21*   IMMATURE GRANS (ABS) 0.42* 0.29*  --  0.12*  --  0.15*   LYMPHS ABS 2.61 2.42  --  1.80  --  1.61   MONOS ABS 1.08* 1.19*  --   0.89  --  1.06*   EOS ABS 0.31 0.32  --  0.05  --  0.05   MCV 89.4 89.1  --  90.2  --  88.9   SED RATE  --   --   --   --   --  62*   CRP  --   --   --   --   --  16.64*   PROCALCITONIN 0.83* 1.31*  --  1.80*  --   --    LACTATE  --   --  1.5 2.2* 2.4* 2.2*         Lab 11/24/23  0625 11/23/23 0523 11/22/23 0520   SODIUM 133* 134* 133*   POTASSIUM 3.6 3.3* 3.6   CHLORIDE 100 99 100   CO2 24.4 23.0 22.0   ANION GAP 8.6 12.0 11.0   BUN 16 16 13   CREATININE 1.20 1.20 1.27   EGFR 66.3 66.3 61.9   GLUCOSE 166* 168* 297*   CALCIUM 8.0* 8.1* 8.1*   MAGNESIUM  --   --  1.5*   PHOSPHORUS  --   --  2.4*         Lab 11/24/23 0625 11/23/23 0523 11/22/23 0520   TOTAL PROTEIN 7.5 7.1 7.3   ALBUMIN 2.8* 3.0* 3.2*   GLOBULIN 4.7 4.1 4.1   ALT (SGPT) 14 10 13   AST (SGOT) 17 14 15   BILIRUBIN 0.5 0.6 0.9   ALK PHOS 81 [phone] 76                     Brief Urine Lab Results       None          [x]  Microbiology   Microbiology Results (last 10 days)       Procedure Component Value - Date/Time    Wound Culture - Wound, Leg, Left [[id number]] Collected: 11/23/23 1029    Lab Status: Preliminary result Specimen: Wound from Leg, Left Updated: 11/24/23 0914     Wound Culture No growth     Gram Stain No WBCs or organisms seen    Blood Culture - Blood, Arm, Right [[id number]]  (Normal) Collected: 11/21/23 2055    Lab Status: Preliminary result Specimen: Blood from Arm, Right Updated: 11/23/23 2215     Blood Culture No growth at 2 days    Blood Culture - Blood, Arm, Right [[id number]]  (Normal) Collected: 11/21/23 2020    Lab Status: Preliminary result Specimen: Blood from Arm, Right Updated: 11/23/23 2030     Blood Culture No growth at 2 days          [x]  Radiology  CT Lower Extremity Left With Contrast    Result Date: 11/21/2023    1. 2 mm calcification or foreign body in the soft tissues anterior to the mid to distal tibia. 2. Lower leg subcutaneous edema and skin thickening, which may be due to cellulitis.  Ill-defined fluid in the  subcutaneous fat of the lateral lower leg, but no rim enhancing fluid collection is seen. 3. Tricompartmental arthritic changes of the knee and partially included suprapatellar joint effusion.     ALHAJI MARTINEZ MD       Electronically Signed and Approved By: ALHAJI MARTINEZ MD on 11/21/2023 at 22:32            []  EKG/Telemetry   []  Cardiology/Vascular   []  Pathology  []  Old records  []  Other:    Assessment & Plan   Assessment / Plan     Assessment:  Sepsis, present on admission-fever, elevated white count  Cellulitis of the left lower extremity  Mildly elevated creatinine  Elevated lactic acid   Superficial skin cuts from the rabbit nails of the right hand  Intertrigo of the abdominal folds and groin  Hypertension  Hypothyroidism  Type 2 diabetes mellitus  Gout  Hyperlipidemia      Plan:  Continue to monitor in the hospital  Continue with IV abx Zosyn and vancomycin  Continue topical wound care per wound care team recommendations appreciated  Continue with oral Lasix  Monitor electrolytes renal function volume status  Topical antifungal  Replace potassium  Recheck labs in am on .tomorrow  Further treatment contingent upon his hospital course       Discussed case and plan with RN on 11/24/2023      DVT prophylaxis:  Medical DVT prophylaxis orders are present.    CODE STATUS:   Level Of Support Discussed With: Patient  Code Status (Patient has no pulse and is not breathing): CPR (Attempt to Resuscitate)  Medical Interventions (Patient has pulse or is breathing): Full Support  Electronically signed by ITALO Perez, 11/24/23, 2:38 PM EST.    Attending documentation:  I reviewed the above documentation and independently reviewed and rounded and evaluated the patient and discussed the care plan with LISSETTE Ghotra PA-C, I agree with his findings and plan as documented, what I have added to the care plan and modified is as follows in my documentation and my medical decision making; 68-year-old male with  diabetes, hospitalized on 11/21/2023 with sepsis secondary to left lower extremity cellulitis, extensive greater than 30 cm in length involving large portion of his lower left extremity.  Open wound left anterior lower leg after trauma with nonsterile piece of lumber.  Started on broad-spectrum antibiotics.  Progression of blistering gross lower extremity.  Interval follow-up: Seen and examined, no acute distress, no acute major night events, still has extensive left lower extremity blistering, erythema and drainage of open wound areas, so far wound cultures have been negative but the patient's been on antibiotics.  Vanco level has been 15 at random, white blood cell count remains elevated at 10,000, procalcitonin slowly trending down.  Creatinine 1.2, bicarb 24, potassium 3.6, sodium 133.  Blood sugars in the 100-200 range.  Review of systems obtained, all systems reviewed and negative except for generalized fatigue, generalized weakness, left leg pain.  On physical exam well-appearing male, no acute distress, sitting upright, regular rate and rhythm, clear to auscultation bilaterally, soft nontender abdomen, left lower extremity with significant erythema and blistering with drainage, and warmth.  Assessment as above, plan, continue vancomycin while monitoring and dosing by AUC method, continue Zosyn, will request nursing staff to reculture open areas of wound, continue Lasix 40 mg daily, continue insulin sliding scale coverage, still without significant improvement physically of cellulitic area.  A.m. labs, full code, DVT prophylaxis with heparin, clinical course to dictate further management, discussed with nurse at the bedside.  More than 75 % of the time of this patient's encounter was performed by me, this included face-to-face time, planning and coordinating, medical decision making and critical thinking personally done by me.                  Electronically signed by Wyatt Leiva MD, 11/24/23, 5:51 PM  EST.  Portions of this documentation were transcribed electronically from a voice recognition software.  I confirm all data accurately represents the service(s) I performed at today's visit.

## 2023-11-25 ENCOUNTER — APPOINTMENT (OUTPATIENT)
Dept: CT IMAGING | Facility: HOSPITAL | Age: 68
DRG: 872 | End: 2023-11-25
Payer: OTHER GOVERNMENT

## 2023-11-25 LAB
ALBUMIN SERPL-MCNC: 3 G/DL (ref 3.5–5.2)
ALP SERPL-CCNC: 90 U/L (ref 39–117)
ALT SERPL W P-5'-P-CCNC: 17 U/L (ref 1–41)
ANION GAP SERPL CALCULATED.3IONS-SCNC: 11 MMOL/L (ref 5–15)
ANISOCYTOSIS BLD QL: ABNORMAL
AST SERPL-CCNC: 24 U/L (ref 1–40)
BILIRUB CONJ SERPL-MCNC: 0.2 MG/DL (ref 0–0.3)
BILIRUB INDIRECT SERPL-MCNC: 0.3 MG/DL
BILIRUB SERPL-MCNC: 0.5 MG/DL (ref 0–1.2)
BUN SERPL-MCNC: 18 MG/DL (ref 8–23)
BUN/CREAT SERPL: 14.3 (ref 7–25)
CALCIUM SPEC-SCNC: 8.2 MG/DL (ref 8.6–10.5)
CHLORIDE SERPL-SCNC: 100 MMOL/L (ref 98–107)
CLUMPED PLATELETS: PRESENT
CO2 SERPL-SCNC: 25 MMOL/L (ref 22–29)
CREAT SERPL-MCNC: 1.26 MG/DL (ref 0.76–1.27)
DACRYOCYTES BLD QL SMEAR: ABNORMAL
DEPRECATED RDW RBC AUTO: 43.8 FL (ref 37–54)
EGFRCR SERPLBLD CKD-EPI 2021: 62.5 ML/MIN/1.73
EOSINOPHIL # BLD MANUAL: 0.22 10*3/MM3 (ref 0–0.4)
EOSINOPHIL NFR BLD MANUAL: 2 % (ref 0.3–6.2)
ERYTHROCYTE [DISTWIDTH] IN BLOOD BY AUTOMATED COUNT: 13.4 % (ref 12.3–15.4)
GLUCOSE BLDC GLUCOMTR-MCNC: 154 MG/DL (ref 70–99)
GLUCOSE BLDC GLUCOMTR-MCNC: 166 MG/DL (ref 70–99)
GLUCOSE BLDC GLUCOMTR-MCNC: 193 MG/DL (ref 70–99)
GLUCOSE BLDC GLUCOMTR-MCNC: 196 MG/DL (ref 70–99)
GLUCOSE SERPL-MCNC: 174 MG/DL (ref 65–99)
HCT VFR BLD AUTO: 33.7 % (ref 37.5–51)
HGB BLD-MCNC: 10.8 G/DL (ref 13–17.7)
LYMPHOCYTES # BLD MANUAL: 3.17 10*3/MM3 (ref 0.7–3.1)
LYMPHOCYTES NFR BLD MANUAL: 5 % (ref 5–12)
MACROCYTES BLD QL SMEAR: ABNORMAL
MAGNESIUM SERPL-MCNC: 1.5 MG/DL (ref 1.6–2.4)
MCH RBC QN AUTO: 28.6 PG (ref 26.6–33)
MCHC RBC AUTO-ENTMCNC: 32 G/DL (ref 31.5–35.7)
MCV RBC AUTO: 89.2 FL (ref 79–97)
METAMYELOCYTES NFR BLD MANUAL: 1 % (ref 0–0)
MICROCYTES BLD QL: ABNORMAL
MONOCYTES # BLD: 0.55 10*3/MM3 (ref 0.1–0.9)
MRSA DNA SPEC QL NAA+PROBE: NORMAL
NEUTROPHILS # BLD AUTO: 6.33 10*3/MM3 (ref 1.7–7)
NEUTROPHILS NFR BLD MANUAL: 57 % (ref 42.7–76)
NEUTS BAND NFR BLD MANUAL: 1 % (ref 0–5)
OVALOCYTES BLD QL SMEAR: ABNORMAL
PHOSPHATE SERPL-MCNC: 3.2 MG/DL (ref 2.5–4.5)
PLATELET # BLD AUTO: 238 10*3/MM3 (ref 140–450)
PMV BLD AUTO: 9.7 FL (ref 6–12)
POTASSIUM SERPL-SCNC: 3.7 MMOL/L (ref 3.5–5.2)
PROCALCITONIN SERPL-MCNC: 0.61 NG/ML (ref 0–0.25)
PROMYELOCYTES NFR BLD MANUAL: 5 % (ref 0–0)
PROT SERPL-MCNC: 8 G/DL (ref 6–8.5)
RBC # BLD AUTO: 3.78 10*6/MM3 (ref 4.14–5.8)
SMALL PLATELETS BLD QL SMEAR: ADEQUATE
SODIUM SERPL-SCNC: 136 MMOL/L (ref 136–145)
VARIANT LYMPHS NFR BLD MANUAL: 24 % (ref 19.6–45.3)
VARIANT LYMPHS NFR BLD MANUAL: 5 % (ref 0–5)
WBC MORPH BLD: NORMAL
WBC NRBC COR # BLD AUTO: 10.92 10*3/MM3 (ref 3.4–10.8)

## 2023-11-25 PROCEDURE — 87641 MR-STAPH DNA AMP PROBE: CPT | Performed by: FAMILY MEDICINE

## 2023-11-25 PROCEDURE — 80048 BASIC METABOLIC PNL TOTAL CA: CPT | Performed by: FAMILY MEDICINE

## 2023-11-25 PROCEDURE — 80076 HEPATIC FUNCTION PANEL: CPT | Performed by: FAMILY MEDICINE

## 2023-11-25 PROCEDURE — 25010000002 MAGNESIUM SULFATE 2 GM/50ML SOLUTION: Performed by: PHYSICIAN ASSISTANT

## 2023-11-25 PROCEDURE — 73700 CT LOWER EXTREMITY W/O DYE: CPT

## 2023-11-25 PROCEDURE — 25010000002 HEPARIN (PORCINE) PER 1000 UNITS: Performed by: STUDENT IN AN ORGANIZED HEALTH CARE EDUCATION/TRAINING PROGRAM

## 2023-11-25 PROCEDURE — 99233 SBSQ HOSP IP/OBS HIGH 50: CPT | Performed by: FAMILY MEDICINE

## 2023-11-25 PROCEDURE — 85007 BL SMEAR W/DIFF WBC COUNT: CPT | Performed by: FAMILY MEDICINE

## 2023-11-25 PROCEDURE — 83735 ASSAY OF MAGNESIUM: CPT | Performed by: FAMILY MEDICINE

## 2023-11-25 PROCEDURE — 25810000003 SODIUM CHLORIDE 0.9 % SOLUTION: Performed by: STUDENT IN AN ORGANIZED HEALTH CARE EDUCATION/TRAINING PROGRAM

## 2023-11-25 PROCEDURE — 85025 COMPLETE CBC W/AUTO DIFF WBC: CPT | Performed by: FAMILY MEDICINE

## 2023-11-25 PROCEDURE — 63710000001 INSULIN LISPRO (HUMAN) PER 5 UNITS: Performed by: PHYSICIAN ASSISTANT

## 2023-11-25 PROCEDURE — 25010000002 VANCOMYCIN 5 G RECONSTITUTED SOLUTION: Performed by: STUDENT IN AN ORGANIZED HEALTH CARE EDUCATION/TRAINING PROGRAM

## 2023-11-25 PROCEDURE — 25010000002 PIPERACILLIN SOD-TAZOBACTAM PER 1 G: Performed by: STUDENT IN AN ORGANIZED HEALTH CARE EDUCATION/TRAINING PROGRAM

## 2023-11-25 PROCEDURE — 84145 PROCALCITONIN (PCT): CPT | Performed by: PHYSICIAN ASSISTANT

## 2023-11-25 PROCEDURE — 82948 REAGENT STRIP/BLOOD GLUCOSE: CPT

## 2023-11-25 PROCEDURE — 84100 ASSAY OF PHOSPHORUS: CPT | Performed by: FAMILY MEDICINE

## 2023-11-25 RX ORDER — MAGNESIUM SULFATE HEPTAHYDRATE 40 MG/ML
2 INJECTION, SOLUTION INTRAVENOUS ONCE
Status: COMPLETED | OUTPATIENT
Start: 2023-11-25 | End: 2023-11-25

## 2023-11-25 RX ORDER — HYDROCODONE BITARTRATE AND ACETAMINOPHEN 5; 325 MG/1; MG/1
1 TABLET ORAL EVERY 6 HOURS PRN
Status: DISCONTINUED | OUTPATIENT
Start: 2023-11-25 | End: 2023-12-01 | Stop reason: HOSPADM

## 2023-11-25 RX ORDER — HYDROCODONE BITARTRATE AND ACETAMINOPHEN 10; 325 MG/1; MG/1
1 TABLET ORAL EVERY 6 HOURS PRN
Status: DISCONTINUED | OUTPATIENT
Start: 2023-11-25 | End: 2023-12-01 | Stop reason: HOSPADM

## 2023-11-25 RX ADMIN — INSULIN LISPRO 2 UNITS: 100 INJECTION, SOLUTION INTRAVENOUS; SUBCUTANEOUS at 08:36

## 2023-11-25 RX ADMIN — LEVOTHYROXINE SODIUM 175 MCG: 175 TABLET ORAL at 06:48

## 2023-11-25 RX ADMIN — VANCOMYCIN HYDROCHLORIDE 1000 MG: 5 INJECTION, POWDER, LYOPHILIZED, FOR SOLUTION INTRAVENOUS at 13:22

## 2023-11-25 RX ADMIN — MAGNESIUM SULFATE HEPTAHYDRATE 2 G: 40 INJECTION, SOLUTION INTRAVENOUS at 08:37

## 2023-11-25 RX ADMIN — MICONAZOLE NITRATE 1 APPLICATION: 2 POWDER TOPICAL at 08:38

## 2023-11-25 RX ADMIN — Medication 10 ML: at 08:35

## 2023-11-25 RX ADMIN — PANTOPRAZOLE SODIUM 40 MG: 40 TABLET, DELAYED RELEASE ORAL at 06:48

## 2023-11-25 RX ADMIN — MICONAZOLE NITRATE 1 APPLICATION: 2 POWDER TOPICAL at 20:32

## 2023-11-25 RX ADMIN — FUROSEMIDE 40 MG: 40 TABLET ORAL at 08:35

## 2023-11-25 RX ADMIN — PIPERACILLIN AND TAZOBACTAM 4.5 G: 4; .5 INJECTION, POWDER, FOR SOLUTION INTRAVENOUS at 04:36

## 2023-11-25 RX ADMIN — VANCOMYCIN HYDROCHLORIDE 1000 MG: 5 INJECTION, POWDER, LYOPHILIZED, FOR SOLUTION INTRAVENOUS at 01:31

## 2023-11-25 RX ADMIN — ATORVASTATIN CALCIUM 80 MG: 40 TABLET, FILM COATED ORAL at 20:32

## 2023-11-25 RX ADMIN — INSULIN LISPRO 2 UNITS: 100 INJECTION, SOLUTION INTRAVENOUS; SUBCUTANEOUS at 20:31

## 2023-11-25 RX ADMIN — KETOCONAZOLE 1 APPLICATION: 20 CREAM TOPICAL at 08:38

## 2023-11-25 RX ADMIN — HYDROXYZINE HYDROCHLORIDE 30 MG: 10 TABLET ORAL at 08:35

## 2023-11-25 RX ADMIN — PAROXETINE HYDROCHLORIDE 60 MG: 20 TABLET, FILM COATED ORAL at 08:35

## 2023-11-25 RX ADMIN — PIPERACILLIN AND TAZOBACTAM 4.5 G: 4; .5 INJECTION, POWDER, FOR SOLUTION INTRAVENOUS at 20:33

## 2023-11-25 RX ADMIN — Medication 10 ML: at 20:32

## 2023-11-25 RX ADMIN — HEPARIN SODIUM 5000 UNITS: 5000 INJECTION INTRAVENOUS; SUBCUTANEOUS at 13:22

## 2023-11-25 RX ADMIN — HEPARIN SODIUM 5000 UNITS: 5000 INJECTION INTRAVENOUS; SUBCUTANEOUS at 06:47

## 2023-11-25 RX ADMIN — INSULIN LISPRO 2 UNITS: 100 INJECTION, SOLUTION INTRAVENOUS; SUBCUTANEOUS at 12:20

## 2023-11-25 RX ADMIN — PIPERACILLIN AND TAZOBACTAM 4.5 G: 4; .5 INJECTION, POWDER, FOR SOLUTION INTRAVENOUS at 13:22

## 2023-11-25 RX ADMIN — HEPARIN SODIUM 5000 UNITS: 5000 INJECTION INTRAVENOUS; SUBCUTANEOUS at 20:31

## 2023-11-25 RX ADMIN — HYDROCODONE BITARTRATE AND ACETAMINOPHEN 1 TABLET: 5; 325 TABLET ORAL at 20:32

## 2023-11-25 RX ADMIN — INSULIN LISPRO 2 UNITS: 100 INJECTION, SOLUTION INTRAVENOUS; SUBCUTANEOUS at 17:35

## 2023-11-25 NOTE — PLAN OF CARE
Goal Outcome Evaluation:   Patient a&ox4. No complaints of pain per the patient this shift. Wound dressing dry and intact. Blood glucose monitored as ordered.

## 2023-11-25 NOTE — PROGRESS NOTES
[facility]   Hospitalist Progress Note  Date: 2023  Patient Name: Raymond M Jr Damico  : 1955  MRN: [id number]  Date of admission: 2023  Room/Bed: [facility]      Subjective   Subjective     Chief Complaint: leg cellulitis    Summary:Raymond M Jr Damico is a 68 y.o. male with hypertension, hypothyroidism, diabetes presented with left leg erythema swelling and fever.  10 days prior he had scraped his left shin superficially with a piece of wood.  The day prior to admission he began having erythema and was brought to the ED and was diagnosed with cellulitis and placed on IV abx.    Interval follow-up: Patient seen and examined no real improvement noted in patient's cellulitic left lower extremity leukocyte count remains mildly elevated at 10 rechecking procalcitonin checking sedimentation and C-reactive protein given lack of improvement in left lower extremity will repeat CT scan to see if there is any drainable abscess.  Magnesium is low replace intravenously continue with pain control encourage patient to keep his left leg elevated      All systems reviewed and negative except: Generalized weakness fatigue leg pain      Objective   Objective     Vitals:   Temp:  [98.1 °F (36.7 °C)-99.7 °F (37.6 °C)] 98.4 °F (36.9 °C)  Heart Rate:  [68-79] 78  Resp:  [16-20] 18  BP: (107-132)/(61-86) 112/71    Physical Exam   General: Awake, alert, NAD  HENT: NCAT, MMM  Eyes: pupils equal, no scleral icterus  Cardiovascular: RRR, no murmurs   Pulmonary: CTA bilaterally; no wheezes; no conversational dyspnea  Gastrointestinal: S/ND/NT, +BS  Skin: Very edematous, erythematous painful lower extremity no significant improvement noted    Result Review    Result Review:  I have personally reviewed these results on 2023    [x]  Laboratory      Lab 23  0418 23  0625 23  0523 23  0842 23  0520 23  0101 23   WBC 10.92* 10.83* 13.92*  --  13.31*  --  16.15*   HEMOGLOBIN  10.8* 10.9* 10.6*  --  11.4*  --  13.0   HEMATOCRIT 33.7* 33.7* 32.8*  --  35.7*  --  39.2   PLATELETS 238 223 208  --  183  --  241   NEUTROS ABS 6.33 6.34 9.64*  --  10.40*  --  13.21*   IMMATURE GRANS (ABS)  --  0.42* 0.29*  --  0.12*  --  0.15*   LYMPHS ABS  --  2.61 2.42  --  1.80  --  1.61   MONOS ABS  --  1.08* 1.19*  --  0.89  --  1.06*   EOS ABS 0.22 0.31 0.32  --  0.05  --  0.05   MCV 89.2 89.4 89.1  --  90.2  --  88.9   SED RATE  --   --   --   --   --   --  62*   CRP  --   --   --   --   --   --  16.64*   PROCALCITONIN  --  0.83* 1.31*  --  1.80*  --   --    LACTATE  --   --   --  1.5 2.2* 2.4* 2.2*         Lab 11/25/23 0418 11/24/23 0625 11/23/23 0523 11/22/23  0520   SODIUM 136 133* 134* 133*   POTASSIUM 3.7 3.6 3.3* 3.6   CHLORIDE 100 100 99 100   CO2 25.0 24.4 23.0 22.0   ANION GAP 11.0 8.6 12.0 11.0   BUN 18 16 16 13   CREATININE 1.26 1.20 1.20 1.27   EGFR 62.5 66.3 66.3 61.9   GLUCOSE 174* 166* 168* 297*   CALCIUM 8.2* 8.0* 8.1* 8.1*   MAGNESIUM 1.5*  --   --  1.5*   PHOSPHORUS 3.2  --   --  2.4*         Lab 11/25/23 0418 11/24/23 0625 11/23/23 0523 11/22/23  0520   TOTAL PROTEIN 8.0 7.5 7.1 7.3   ALBUMIN 3.0* 2.8* 3.0* 3.2*   GLOBULIN  --  4.7 4.1 4.1   ALT (SGPT) 17 14 10 13   AST (SGOT) 24 17 14 15   BILIRUBIN 0.5 0.5 0.6 0.9   INDIRECT BILIRUBIN 0.3  --   --   --    BILIRUBIN DIRECT 0.2  --   --   --    ALK PHOS 90 [phone]                     Brief Urine Lab Results       None          [x]  Microbiology   Microbiology Results (last 10 days)       Procedure Component Value - Date/Time    Wound Culture - Wound, Leg, Left [[id number]] Collected: 11/23/23 1029    Lab Status: Preliminary result Specimen: Wound from Leg, Left Updated: 11/25/23 0831     Wound Culture No growth at 2 days     Gram Stain No WBCs or organisms seen    Blood Culture - Blood, Arm, Right [[id number]]  (Normal) Collected: 11/21/23 2055    Lab Status: Preliminary result Specimen: Blood from Arm, Right Updated: 11/24/23  2215     Blood Culture No growth at 3 days    Blood Culture - Blood, Arm, Right [[id number]]  (Normal) Collected: 11/21/23 2020    Lab Status: Preliminary result Specimen: Blood from Arm, Right Updated: 11/24/23 2031     Blood Culture No growth at 3 days          [x]  Radiology  CT Lower Extremity Left With Contrast    Result Date: 11/21/2023    1. 2 mm calcification or foreign body in the soft tissues anterior to the mid to distal tibia. 2. Lower leg subcutaneous edema and skin thickening, which may be due to cellulitis.  Ill-defined fluid in the subcutaneous fat of the lateral lower leg, but no rim enhancing fluid collection is seen. 3. Tricompartmental arthritic changes of the knee and partially included suprapatellar joint effusion.     ALHAJI MARTINEZ MD       Electronically Signed and Approved By: ALHAJI MARTINEZ MD on 11/21/2023 at 22:32            []  EKG/Telemetry   []  Cardiology/Vascular   []  Pathology  []  Old records  []  Other:    Assessment & Plan   Assessment / Plan     Assessment:  Sepsis, present on admission-fever, elevated white count  Cellulitis of the left lower extremity  Mildly elevated creatinine  Elevated lactic acid   Superficial skin cuts from the rabbit nails of the right hand  Intertrigo of the abdominal folds and groin  Hypertension  Hypothyroidism  Type 2 diabetes mellitus  Gout  Hyperlipidemia      Plan:  Given lack of improvement in left lower extremity cellulitis will pursue repeat CT scan left lower extremity  Check sedimentation rate and C-reactive protein  Repeat procalcitonin  Keep left leg elevated with 2-3 pillows  Continue with antibiotics Zosyn and vancomycin  Continue topical wound care  Continue Lasix monitor renal function volume status and electrolytes while diuresing  Check a.m. labs  Further treatment contingent upon his hospital course  Discussed with RN    DVT prophylaxis:  Medical DVT prophylaxis orders are present.    CODE STATUS:   Level Of Support Discussed  With: Patient  Code Status (Patient has no pulse and is not breathing): CPR (Attempt to Resuscitate)  Medical Interventions (Patient has pulse or is breathing): Full Support  Electronically signed by ITALO Perez, 11/25/23, 11:59 AM EST.    Attending documentation:  I reviewed the above documentation and independently reviewed and rounded and evaluated the patient and discussed the care plan with LISSETTE Ghotra PA-C, I agree with his findings and plan as documented, what I have added to the care plan and modified is as follows in my documentation and my medical decision making; 68-year-old male with diabetes, hospitalized on 11/21/2023 with sepsis secondary to left lower extremity cellulitis, extensive greater than 30 cm in length involving large portion of his lower left extremity.  Open wound left anterior lower leg after trauma with nonsterile piece of lumber.  Started on broad-spectrum antibiotics.  Progression of blistering gross lower extremity.  Worsening erythema, pursuing CT scan of the left lower extremity 11/25/2023.  Interval follow-up: Seen and examined, no acute distress, no acute major night events, still has extensive left lower extremity blistering, erythema and drainage of open wound areas, in particular erythema has worsened, creatinine 1.26, potassium 3.7, white blood cell count remains elevated at 10,000.  Review of systems obtained, all systems reviewed and negative except for generalized fatigue, generalized weakness, left leg pain, swelling, erythema.  On physical exam well-appearing male, no acute distress, laying in bed, regular rate and rhythm, clear to auscultation bilaterally, soft nontender abdomen, left lower extremity with significant erythema and blistering with drainage worse from previous day, and warmth.  Assessment as above, plan, I have ordered a CT scan of his left lower extremity to further evaluate for possible abscess formation, continue vancomycin while monitoring and  dosing by AUC method, continue Zosyn, follow-up repeat wound culture, continue Lasix 40 mg daily, continue insulin sliding scale coverage, still without significant improvement physically of cellulitic area.  A.m. labs, full code, DVT prophylaxis with heparin, clinical course to dictate further management, discussed with nurse at the bedside.  More than 65 % of the time of this patient's encounter was performed by me, this included face-to-face time, planning and coordinating, medical decision making and critical thinking personally done by me.     Electronically signed by Wyatt Leiva MD, 11/25/23, 1:30 PM EST.  Portions of this documentation were transcribed electronically from a voice recognition software.  I confirm all data accurately represents the service(s) I performed at today's visit.

## 2023-11-26 LAB
ALBUMIN SERPL-MCNC: 3.1 G/DL (ref 3.5–5.2)
ALP SERPL-CCNC: 88 U/L (ref 39–117)
ALT SERPL W P-5'-P-CCNC: 18 U/L (ref 1–41)
ANION GAP SERPL CALCULATED.3IONS-SCNC: 11.5 MMOL/L (ref 5–15)
AST SERPL-CCNC: 23 U/L (ref 1–40)
BACTERIA SPEC AEROBE CULT: NORMAL
BILIRUB CONJ SERPL-MCNC: 0.2 MG/DL (ref 0–0.3)
BILIRUB INDIRECT SERPL-MCNC: 0.4 MG/DL
BILIRUB SERPL-MCNC: 0.6 MG/DL (ref 0–1.2)
BUN SERPL-MCNC: 20 MG/DL (ref 8–23)
BUN/CREAT SERPL: 14.7 (ref 7–25)
CALCIUM SPEC-SCNC: 8.2 MG/DL (ref 8.6–10.5)
CHLORIDE SERPL-SCNC: 101 MMOL/L (ref 98–107)
CO2 SERPL-SCNC: 24.5 MMOL/L (ref 22–29)
CREAT SERPL-MCNC: 1.36 MG/DL (ref 0.76–1.27)
CRP SERPL-MCNC: 8.6 MG/DL (ref 0–0.5)
DEPRECATED RDW RBC AUTO: 43.6 FL (ref 37–54)
EGFRCR SERPLBLD CKD-EPI 2021: 57 ML/MIN/1.73
EOSINOPHIL # BLD MANUAL: 0.41 10*3/MM3 (ref 0–0.4)
EOSINOPHIL NFR BLD MANUAL: 4 % (ref 0.3–6.2)
ERYTHROCYTE [DISTWIDTH] IN BLOOD BY AUTOMATED COUNT: 13.2 % (ref 12.3–15.4)
ERYTHROCYTE [SEDIMENTATION RATE] IN BLOOD: 86 MM/HR (ref 0–20)
GLUCOSE BLDC GLUCOMTR-MCNC: 121 MG/DL (ref 70–99)
GLUCOSE BLDC GLUCOMTR-MCNC: 168 MG/DL (ref 70–99)
GLUCOSE BLDC GLUCOMTR-MCNC: 179 MG/DL (ref 70–99)
GLUCOSE BLDC GLUCOMTR-MCNC: 199 MG/DL (ref 70–99)
GLUCOSE SERPL-MCNC: 142 MG/DL (ref 65–99)
GRAM STN SPEC: NORMAL
HCT VFR BLD AUTO: 34.3 % (ref 37.5–51)
HGB BLD-MCNC: 10.9 G/DL (ref 13–17.7)
LYMPHOCYTES # BLD MANUAL: 2.59 10*3/MM3 (ref 0.7–3.1)
LYMPHOCYTES NFR BLD MANUAL: 6 % (ref 5–12)
MAGNESIUM SERPL-MCNC: 1.8 MG/DL (ref 1.6–2.4)
MCH RBC QN AUTO: 28.5 PG (ref 26.6–33)
MCHC RBC AUTO-ENTMCNC: 31.8 G/DL (ref 31.5–35.7)
MCV RBC AUTO: 89.8 FL (ref 79–97)
METAMYELOCYTES NFR BLD MANUAL: 1 % (ref 0–0)
MONOCYTES # BLD: 0.62 10*3/MM3 (ref 0.1–0.9)
NEUTROPHILS # BLD AUTO: 6.62 10*3/MM3 (ref 1.7–7)
NEUTROPHILS NFR BLD MANUAL: 58 % (ref 42.7–76)
NEUTS BAND NFR BLD MANUAL: 6 % (ref 0–5)
PHOSPHATE SERPL-MCNC: 3.4 MG/DL (ref 2.5–4.5)
PLAT MORPH BLD: NORMAL
PLATELET # BLD AUTO: 259 10*3/MM3 (ref 140–450)
PMV BLD AUTO: 9.5 FL (ref 6–12)
POTASSIUM SERPL-SCNC: 3.5 MMOL/L (ref 3.5–5.2)
PROT SERPL-MCNC: 7.9 G/DL (ref 6–8.5)
RBC # BLD AUTO: 3.82 10*6/MM3 (ref 4.14–5.8)
RBC MORPH BLD: NORMAL
SODIUM SERPL-SCNC: 137 MMOL/L (ref 136–145)
VARIANT LYMPHS NFR BLD MANUAL: 25 % (ref 19.6–45.3)
WBC MORPH BLD: NORMAL
WBC NRBC COR # BLD AUTO: 10.34 10*3/MM3 (ref 3.4–10.8)

## 2023-11-26 PROCEDURE — 80076 HEPATIC FUNCTION PANEL: CPT | Performed by: FAMILY MEDICINE

## 2023-11-26 PROCEDURE — 85652 RBC SED RATE AUTOMATED: CPT | Performed by: FAMILY MEDICINE

## 2023-11-26 PROCEDURE — 25010000002 PIPERACILLIN SOD-TAZOBACTAM PER 1 G: Performed by: STUDENT IN AN ORGANIZED HEALTH CARE EDUCATION/TRAINING PROGRAM

## 2023-11-26 PROCEDURE — 25010000002 HEPARIN (PORCINE) PER 1000 UNITS: Performed by: STUDENT IN AN ORGANIZED HEALTH CARE EDUCATION/TRAINING PROGRAM

## 2023-11-26 PROCEDURE — 84100 ASSAY OF PHOSPHORUS: CPT | Performed by: FAMILY MEDICINE

## 2023-11-26 PROCEDURE — 63710000001 INSULIN LISPRO (HUMAN) PER 5 UNITS: Performed by: PHYSICIAN ASSISTANT

## 2023-11-26 PROCEDURE — 25810000003 SODIUM CHLORIDE 0.9 % SOLUTION: Performed by: STUDENT IN AN ORGANIZED HEALTH CARE EDUCATION/TRAINING PROGRAM

## 2023-11-26 PROCEDURE — 85007 BL SMEAR W/DIFF WBC COUNT: CPT | Performed by: FAMILY MEDICINE

## 2023-11-26 PROCEDURE — 99233 SBSQ HOSP IP/OBS HIGH 50: CPT | Performed by: FAMILY MEDICINE

## 2023-11-26 PROCEDURE — 25010000002 MEROPENEM PER 100 MG: Performed by: FAMILY MEDICINE

## 2023-11-26 PROCEDURE — 83735 ASSAY OF MAGNESIUM: CPT | Performed by: FAMILY MEDICINE

## 2023-11-26 PROCEDURE — 25010000002 VANCOMYCIN 5 G RECONSTITUTED SOLUTION: Performed by: STUDENT IN AN ORGANIZED HEALTH CARE EDUCATION/TRAINING PROGRAM

## 2023-11-26 PROCEDURE — 82948 REAGENT STRIP/BLOOD GLUCOSE: CPT

## 2023-11-26 PROCEDURE — 80048 BASIC METABOLIC PNL TOTAL CA: CPT | Performed by: FAMILY MEDICINE

## 2023-11-26 PROCEDURE — 85025 COMPLETE CBC W/AUTO DIFF WBC: CPT | Performed by: FAMILY MEDICINE

## 2023-11-26 PROCEDURE — 86140 C-REACTIVE PROTEIN: CPT | Performed by: FAMILY MEDICINE

## 2023-11-26 RX ADMIN — VANCOMYCIN HYDROCHLORIDE 1000 MG: 5 INJECTION, POWDER, LYOPHILIZED, FOR SOLUTION INTRAVENOUS at 01:47

## 2023-11-26 RX ADMIN — MICONAZOLE NITRATE 1 APPLICATION: 2 POWDER TOPICAL at 12:30

## 2023-11-26 RX ADMIN — MEROPENEM 500 MG: 500 INJECTION, POWDER, FOR SOLUTION INTRAVENOUS at 23:12

## 2023-11-26 RX ADMIN — MEROPENEM 500 MG: 500 INJECTION, POWDER, FOR SOLUTION INTRAVENOUS at 12:28

## 2023-11-26 RX ADMIN — HYDROCODONE BITARTRATE AND ACETAMINOPHEN 1 TABLET: 5; 325 TABLET ORAL at 14:38

## 2023-11-26 RX ADMIN — HYDROCODONE BITARTRATE AND ACETAMINOPHEN 1 TABLET: 5; 325 TABLET ORAL at 05:02

## 2023-11-26 RX ADMIN — PANTOPRAZOLE SODIUM 40 MG: 40 TABLET, DELAYED RELEASE ORAL at 05:02

## 2023-11-26 RX ADMIN — LEVOTHYROXINE SODIUM 175 MCG: 175 TABLET ORAL at 05:02

## 2023-11-26 RX ADMIN — INSULIN LISPRO 2 UNITS: 100 INJECTION, SOLUTION INTRAVENOUS; SUBCUTANEOUS at 08:30

## 2023-11-26 RX ADMIN — Medication 10 ML: at 09:19

## 2023-11-26 RX ADMIN — HYDROXYZINE HYDROCHLORIDE 30 MG: 10 TABLET ORAL at 09:19

## 2023-11-26 RX ADMIN — INSULIN LISPRO 2 UNITS: 100 INJECTION, SOLUTION INTRAVENOUS; SUBCUTANEOUS at 12:28

## 2023-11-26 RX ADMIN — Medication 10 ML: at 21:32

## 2023-11-26 RX ADMIN — INSULIN LISPRO 2 UNITS: 100 INJECTION, SOLUTION INTRAVENOUS; SUBCUTANEOUS at 21:30

## 2023-11-26 RX ADMIN — HEPARIN SODIUM 5000 UNITS: 5000 INJECTION INTRAVENOUS; SUBCUTANEOUS at 13:18

## 2023-11-26 RX ADMIN — MICONAZOLE NITRATE 1 APPLICATION: 2 POWDER TOPICAL at 21:31

## 2023-11-26 RX ADMIN — PAROXETINE HYDROCHLORIDE 60 MG: 20 TABLET, FILM COATED ORAL at 09:19

## 2023-11-26 RX ADMIN — HEPARIN SODIUM 5000 UNITS: 5000 INJECTION INTRAVENOUS; SUBCUTANEOUS at 21:31

## 2023-11-26 RX ADMIN — PIPERACILLIN AND TAZOBACTAM 4.5 G: 4; .5 INJECTION, POWDER, FOR SOLUTION INTRAVENOUS at 05:03

## 2023-11-26 RX ADMIN — MEROPENEM 500 MG: 500 INJECTION, POWDER, FOR SOLUTION INTRAVENOUS at 17:30

## 2023-11-26 RX ADMIN — ATORVASTATIN CALCIUM 80 MG: 40 TABLET, FILM COATED ORAL at 21:31

## 2023-11-26 RX ADMIN — HEPARIN SODIUM 5000 UNITS: 5000 INJECTION INTRAVENOUS; SUBCUTANEOUS at 05:02

## 2023-11-26 RX ADMIN — KETOCONAZOLE 1 APPLICATION: 20 CREAM TOPICAL at 09:20

## 2023-11-26 NOTE — PLAN OF CARE
Goal Outcome Evaluation:  Plan of Care Reviewed With: patient         Pt alert and oriented. Left leg cellulitis seems to be spreading upward and outward . Areas marked and pictures taken. Pt reports increased pain/aching in bilateral legs. Iv antibiotics were changed today. Pt was taken off flex monitoring per his request. Pt encouraged to keep leg elevated more and to keep sitting on side of bed to a minimum.

## 2023-11-26 NOTE — PLAN OF CARE
Goal Outcome Evaluation:  Plan of Care Reviewed With: patient      Pt alert and oriented. Wound care as ordered. Left leg very red. Went to ct of leg. Md ordered prn pain medication for patient. Iv antibiotics given as ordered

## 2023-11-26 NOTE — PROGRESS NOTES
[facility]   Hospitalist Progress Note  Date: 2023  Patient Name: Raymond M Jr Damico  : 1955  MRN: [id number]  Date of admission: 2023  Room/Bed: [facility]      Subjective   Subjective     Chief Complaint: leg cellulitis    Summary:Raymond M Jr Damico is a 68 y.o. male with hypertension, hypothyroidism, diabetes presented with left leg erythema swelling and fever.  10 days prior he had scraped his left shin superficially with a piece of wood.  The day prior to admission he began having erythema and was brought to the ED and was diagnosed with cellulitis and placed on IV abx.    Interval follow-up: Patient seen and examined resting comfortably repeat CT scan reviewed no drainable abscess noted some signs of worsening cellulitis noted discontinue Zosyn start Merrem serum creatinine now up discontinue Lasix    All systems reviewed and negative except: Generalized weakness fatigue leg pain      Objective   Objective     Vitals:   Temp:  [97.8 °F (36.6 °C)-98.4 °F (36.9 °C)] 98 °F (36.7 °C)  Heart Rate:  [68-76] 68  Resp:  [18-20] 18  BP: (129-153)/(73-78) 150/74    Physical Exam   General: Awake, alert, NAD  HENT: NCAT, MMM  Eyes: pupils equal, no scleral icterus  Cardiovascular: RRR, no murmurs   Pulmonary: CTA bilaterally; no wheezes; no conversational dyspnea  Gastrointestinal: S/ND/NT, +BS  Skin: Very edematous, erythematous painful lower extremity no significant improvement noted    Result Review    Result Review:  I have personally reviewed these results on 2023    [x]  Laboratory      Lab 23  0338 23  0418 23  0625 23  0523 23  0842 23  0520 23  0101 23   WBC 10.34 10.92* 10.83* 13.92*  --  13.31*  --   --  16.15*   HEMOGLOBIN 10.9* 10.8* 10.9* 10.6*  --  11.4*  --   --  13.0   HEMATOCRIT 34.3* 33.7* 33.7* 32.8*  --  35.7*  --   --  39.2   PLATELETS 259 238 223 208  --  183  --   --  241   NEUTROS ABS 6.62 6.33 6.34  9.64*  --  10.40*  --   --  13.21*   IMMATURE GRANS (ABS)  --   --  0.42* 0.29*  --  0.12*  --   --  0.15*   LYMPHS ABS  --   --  2.61 2.42  --  1.80  --   --  1.61   MONOS ABS  --   --  1.08* 1.19*  --  0.89  --   --  1.06*   EOS ABS 0.41* 0.22 0.31 0.32  --  0.05  --   --  0.05   MCV 89.8 89.2 89.4 89.1  --  90.2  --   --  88.9   SED RATE 86*  --   --   --   --   --   --   --  62*   CRP 8.60*  --   --   --   --   --   --   --  16.64*   PROCALCITONIN  --  0.61* 0.83* 1.31*  --  1.80*  --    < >  --    LACTATE  --   --   --   --  1.5 2.2* 2.4*  --  2.2*    < > = values in this interval not displayed.         Lab 11/26/23 0338 11/25/23 0418 11/24/23 0625 11/23/23  0523 11/22/23  0520   SODIUM 137 136 133*   < > 133*   POTASSIUM 3.5 3.7 3.6   < > 3.6   CHLORIDE 101 100 100   < > 100   CO2 24.5 25.0 24.4   < > 22.0   ANION GAP 11.5 11.0 8.6   < > 11.0   BUN 20 18 16   < > 13   CREATININE 1.36* 1.26 1.20   < > 1.27   EGFR 57.0* 62.5 66.3   < > 61.9   GLUCOSE 142* 174* 166*   < > 297*   CALCIUM 8.2* 8.2* 8.0*   < > 8.1*   MAGNESIUM 1.8 1.5*  --   --  1.5*   PHOSPHORUS 3.4 3.2  --   --  2.4*    < > = values in this interval not displayed.         Lab 11/26/23 0338 11/25/23 0418 11/24/23 0625 11/23/23  0523 11/22/23  0520   TOTAL PROTEIN 7.9 8.0 7.5 7.1 7.3   ALBUMIN 3.1* 3.0* 2.8* 3.0* 3.2*   GLOBULIN  --   --  4.7 4.1 4.1   ALT (SGPT) 18 17 14 10 13   AST (SGOT) 23 24 17 14 15   BILIRUBIN 0.6 0.5 0.5 0.6 0.9   INDIRECT BILIRUBIN 0.4 0.3  --   --   --    BILIRUBIN DIRECT 0.2 0.2  --   --   --    ALK PHOS 88 [phone]                     Brief Urine Lab Results       None          [x]  Microbiology   Microbiology Results (last 10 days)       Procedure Component Value - Date/Time    MRSA Screen, PCR (Inpatient) - Swab, Nares [[id number]]  (Normal) Collected: 11/25/23 1244    Lab Status: Final result Specimen: Swab from Nares Updated: 11/25/23 1358     MRSA PCR No MRSA Detected    Narrative:      The negative  predictive value of this diagnostic test is high and should only be used to consider de-escalating anti-MRSA therapy. A positive result may indicate colonization with MRSA and must be correlated clinically.    Wound Culture - Wound, Leg, Left [[id number]] Collected: 11/23/23 1029    Lab Status: Final result Specimen: Wound from Leg, Left Updated: 11/26/23 1107     Wound Culture No growth at 3 days     Gram Stain No WBCs or organisms seen    Blood Culture - Blood, Arm, Right [[id number]]  (Normal) Collected: 11/21/23 2055    Lab Status: Preliminary result Specimen: Blood from Arm, Right Updated: 11/25/23 2215     Blood Culture No growth at 4 days    Blood Culture - Blood, Arm, Right [[id number]]  (Normal) Collected: 11/21/23 2020    Lab Status: Preliminary result Specimen: Blood from Arm, Right Updated: 11/25/23 2031     Blood Culture No growth at 4 days          [x]  Radiology  CT Lower Extremity Left Without Contrast    Result Date: 11/25/2023   Worsening soft tissue edema or cellulitis in the left lower leg.  No evidence of acute osseous abnormality, loculated soft tissue fluid collection or abnormal soft tissue gas collection.     JEFRY HARRISON MD       Electronically Signed and Approved By: JEFRY HARRISON MD on 11/25/2023 at 15:27             CT Lower Extremity Left With Contrast    Result Date: 11/21/2023    1. 2 mm calcification or foreign body in the soft tissues anterior to the mid to distal tibia. 2. Lower leg subcutaneous edema and skin thickening, which may be due to cellulitis.  Ill-defined fluid in the subcutaneous fat of the lateral lower leg, but no rim enhancing fluid collection is seen. 3. Tricompartmental arthritic changes of the knee and partially included suprapatellar joint effusion.     ALHAJI MARTINEZ MD       Electronically Signed and Approved By: ALHAJI MARTINEZ MD on 11/21/2023 at 22:32            []  EKG/Telemetry   []  Cardiology/Vascular   []  Pathology  []  Old records  []   Other:    Assessment & Plan   Assessment / Plan     Assessment:  Sepsis, present on admission-fever, elevated white count  Cellulitis of the left lower extremity  Mildly elevated creatinine  Elevated lactic acid   Superficial skin cuts from the rabbit nails of the right hand  Intertrigo of the abdominal folds and groin  Hypertension  Hypothyroidism  Type 2 diabetes mellitus  Gout  Hyperlipidemia      Plan:  Repeat CT scan left lower extremity reviewed signs of worsening cellulitis but no drainable abscess or gas noted   Given lack of improvement have discontinued Zosyn starting Merrem  Will hold Lasix monitor renal function  Monitor markers of inflammation  Keep left leg elevated with 2-3 pillows  \Continue topical wound care  Check a.m. labs  Further treatment contingent upon his hospital course  Discussed with RN    DVT prophylaxis:  Medical DVT prophylaxis orders are present.    CODE STATUS:   Level Of Support Discussed With: Patient  Code Status (Patient has no pulse and is not breathing): CPR (Attempt to Resuscitate)  Medical Interventions (Patient has pulse or is breathing): Full Support  Electronically signed by ITALO Perez, 11/26/23, 12:05 PM EST.    Attending documentation:  I reviewed the above documentation and independently reviewed and rounded and evaluated the patient and discussed the care plan with LISSETTE Ghotra PA-C, I agree with his findings and plan as documented, what I have added to the care plan and modified is as follows in my documentation and my medical decision making; 68-year-old male with diabetes, hospitalized on 11/21/2023 with sepsis secondary to left lower extremity cellulitis, extensive greater than 30 cm in length involving large portion of his lower left extremity.  Open wound left anterior lower leg after trauma with nonsterile piece of lumber.  Started on broad-spectrum antibiotics.  Progression of blistering gross lower extremity.  Worsening erythema, pursuing CT scan of  the left lower extremity 11/25/2023 shows worsening cellulitis on Zosyn/vancomycin regimen, initial wound was dirty, likely soiled contaminated, escalate antibiotics to meropenem.  Interval follow-up: Seen and examined, no acute distress, no acute major night events, cellulitis continues to worsen despite aggressive measures with Zosyn and vancomycin, in retrospect, wound is dirty with soil contamination as the patient cut his leg with a log outdoors, the log was not sterile and was dirty.  CT scan shows worsening cellulitis.  Discussed advancing antibiotics to cover more soil inhabitant bugs which could pose resistance.  Review of systems obtained, all systems reviewed and negative except for generalized fatigue, generalized weakness, worsening left leg pain, swelling, erythema.  On physical exam well-appearing male, no acute distress, laying in bed, regular rate and rhythm, clear to auscultation bilaterally, soft nontender abdomen, left lower extremity with significant erythema and blistering with drainage advanced from previous day, and warmth.  Assessment as above, plan, CT scan reviewed, stop vancomycin and Zosyn, start meropenem.  Stop Lasix.  Patient's creatinine at 1.36, trending CRP and sed rate, follow-up repeat wound culture, continue Lasix 40 mg daily, continue insulin sliding scale coverage,  A.m. labs, full code, DVT prophylaxis with heparin, clinical course to dictate further management, discussed with nurse at the bedside.  More than 80 % of the time of this patient's encounter was performed by me, this included face-to-face time, planning and coordinating, medical decision making and critical thinking personally done by me.    Electronically signed by Wyatt Leiva MD, 11/26/23, 3:29 PM EST.  Portions of this documentation were transcribed electronically from a voice recognition software.  I confirm all data accurately represents the service(s) I performed at today's visit.

## 2023-11-26 NOTE — PROGRESS NOTES
"[facility] Clinical Pharmacy Services: Vancomycin Monitoring Note    Raymond M Jr Damico is a 68 y.o. male who is on day  of pharmacy to dose vancomycin for Sepsis and Skin and Soft Tissue.    Previous Vancomycin Dose:   1000 mg IV every  12  hours  Imaging Reviewed?: yes  Updated Cultures and Sensitivities:    MRSA PCR -negative  : WOUND CX- LEFT LEG- NGTD  : BLOOD CX  /  NGTD      Vitals/Labs  Ht: 175.3 cm (69.02\"); Wt: 134 kg (295 lb 3.1 oz)     Temp (24hrs), Av.2 °F (36.8 °C), Min:97.8 °F (36.6 °C), Max:98.4 °F (36.9 °C)    Estimated Creatinine Clearance: 71.6 mL/min (A) (by C-G formula based on SCr of 1.36 mg/dL (H)).       Results from last 7 days   Lab Units 23  0338 23  0418 23  0625 23  1114   VANCOMYCIN RM mcg/mL  --   --  15.50  --    VANCOMYCIN TR mcg/mL  --   --   --  12.34   CREATININE mg/dL 1.36* 1.26 1.20  --    WBC 10*3/mm3 10.34 10.92* 10.83*  --      Assessment/Plan    Change Vancomycin Dose to :  1750 mg IV every 24 hours; which provides the following predicted parameters:  AUC24,ss: 493 mg/L.hr  PAUC*: 71 %  Ctrough,ss: 16.6 mg/L  Pconc*: 37 %  Tox.: 12 %        We will continue to monitor patient changes and renal function     Thank you for involving pharmacy in this patient's care. Please contact pharmacy with any questions or concerns.    Ruth Ann Fonseca  Clinical Pharmacist        "

## 2023-11-27 LAB
ALBUMIN SERPL-MCNC: 3.3 G/DL (ref 3.5–5.2)
ALP SERPL-CCNC: 91 U/L (ref 39–117)
ALT SERPL W P-5'-P-CCNC: 21 U/L (ref 1–41)
ANION GAP SERPL CALCULATED.3IONS-SCNC: 8.6 MMOL/L (ref 5–15)
AST SERPL-CCNC: 27 U/L (ref 1–40)
BASOPHILS # BLD AUTO: 0.1 10*3/MM3 (ref 0–0.2)
BASOPHILS NFR BLD AUTO: 0.9 % (ref 0–1.5)
BILIRUB CONJ SERPL-MCNC: <0.2 MG/DL (ref 0–0.3)
BILIRUB INDIRECT SERPL-MCNC: ABNORMAL MG/DL
BILIRUB SERPL-MCNC: 0.5 MG/DL (ref 0–1.2)
BUN SERPL-MCNC: 19 MG/DL (ref 8–23)
BUN/CREAT SERPL: 17 (ref 7–25)
CALCIUM SPEC-SCNC: 8.3 MG/DL (ref 8.6–10.5)
CHLORIDE SERPL-SCNC: 99 MMOL/L (ref 98–107)
CO2 SERPL-SCNC: 28.4 MMOL/L (ref 22–29)
CREAT SERPL-MCNC: 1.12 MG/DL (ref 0.76–1.27)
DEPRECATED RDW RBC AUTO: 43.3 FL (ref 37–54)
EGFRCR SERPLBLD CKD-EPI 2021: 72 ML/MIN/1.73
EOSINOPHIL # BLD AUTO: 0.4 10*3/MM3 (ref 0–0.4)
EOSINOPHIL NFR BLD AUTO: 3.6 % (ref 0.3–6.2)
ERYTHROCYTE [DISTWIDTH] IN BLOOD BY AUTOMATED COUNT: 13.1 % (ref 12.3–15.4)
GLUCOSE BLDC GLUCOMTR-MCNC: 132 MG/DL (ref 70–99)
GLUCOSE BLDC GLUCOMTR-MCNC: 150 MG/DL (ref 70–99)
GLUCOSE BLDC GLUCOMTR-MCNC: 171 MG/DL (ref 70–99)
GLUCOSE BLDC GLUCOMTR-MCNC: 176 MG/DL (ref 70–99)
GLUCOSE SERPL-MCNC: 150 MG/DL (ref 65–99)
HCT VFR BLD AUTO: 35.5 % (ref 37.5–51)
HGB BLD-MCNC: 11.2 G/DL (ref 13–17.7)
IMM GRANULOCYTES # BLD AUTO: 0.98 10*3/MM3 (ref 0–0.05)
IMM GRANULOCYTES NFR BLD AUTO: 8.8 % (ref 0–0.5)
LYMPHOCYTES # BLD AUTO: 3.01 10*3/MM3 (ref 0.7–3.1)
LYMPHOCYTES NFR BLD AUTO: 26.9 % (ref 19.6–45.3)
MAGNESIUM SERPL-MCNC: 1.9 MG/DL (ref 1.6–2.4)
MCH RBC QN AUTO: 28.5 PG (ref 26.6–33)
MCHC RBC AUTO-ENTMCNC: 31.5 G/DL (ref 31.5–35.7)
MCV RBC AUTO: 90.3 FL (ref 79–97)
MONOCYTES # BLD AUTO: 0.93 10*3/MM3 (ref 0.1–0.9)
MONOCYTES NFR BLD AUTO: 8.3 % (ref 5–12)
NEUTROPHILS NFR BLD AUTO: 5.77 10*3/MM3 (ref 1.7–7)
NEUTROPHILS NFR BLD AUTO: 51.5 % (ref 42.7–76)
NRBC BLD AUTO-RTO: 0 /100 WBC (ref 0–0.2)
PHOSPHATE SERPL-MCNC: 3.1 MG/DL (ref 2.5–4.5)
PLATELET # BLD AUTO: 278 10*3/MM3 (ref 140–450)
PMV BLD AUTO: 9.6 FL (ref 6–12)
POTASSIUM SERPL-SCNC: 4.2 MMOL/L (ref 3.5–5.2)
PROT SERPL-MCNC: 8 G/DL (ref 6–8.5)
RBC # BLD AUTO: 3.93 10*6/MM3 (ref 4.14–5.8)
SODIUM SERPL-SCNC: 136 MMOL/L (ref 136–145)
WBC NRBC COR # BLD AUTO: 11.19 10*3/MM3 (ref 3.4–10.8)

## 2023-11-27 PROCEDURE — 87070 CULTURE OTHR SPECIMN AEROBIC: CPT | Performed by: FAMILY MEDICINE

## 2023-11-27 PROCEDURE — 63710000001 INSULIN LISPRO (HUMAN) PER 5 UNITS: Performed by: PHYSICIAN ASSISTANT

## 2023-11-27 PROCEDURE — 87205 SMEAR GRAM STAIN: CPT | Performed by: FAMILY MEDICINE

## 2023-11-27 PROCEDURE — 85025 COMPLETE CBC W/AUTO DIFF WBC: CPT | Performed by: FAMILY MEDICINE

## 2023-11-27 PROCEDURE — 25010000002 HEPARIN (PORCINE) PER 1000 UNITS: Performed by: STUDENT IN AN ORGANIZED HEALTH CARE EDUCATION/TRAINING PROGRAM

## 2023-11-27 PROCEDURE — 80048 BASIC METABOLIC PNL TOTAL CA: CPT | Performed by: FAMILY MEDICINE

## 2023-11-27 PROCEDURE — 99232 SBSQ HOSP IP/OBS MODERATE 35: CPT | Performed by: FAMILY MEDICINE

## 2023-11-27 PROCEDURE — 82948 REAGENT STRIP/BLOOD GLUCOSE: CPT

## 2023-11-27 PROCEDURE — 25010000002 MEROPENEM PER 100 MG: Performed by: FAMILY MEDICINE

## 2023-11-27 PROCEDURE — 83735 ASSAY OF MAGNESIUM: CPT | Performed by: FAMILY MEDICINE

## 2023-11-27 PROCEDURE — 84100 ASSAY OF PHOSPHORUS: CPT | Performed by: FAMILY MEDICINE

## 2023-11-27 PROCEDURE — 80076 HEPATIC FUNCTION PANEL: CPT | Performed by: FAMILY MEDICINE

## 2023-11-27 RX ADMIN — PANTOPRAZOLE SODIUM 40 MG: 40 TABLET, DELAYED RELEASE ORAL at 05:45

## 2023-11-27 RX ADMIN — MICONAZOLE NITRATE 1 APPLICATION: 2 POWDER TOPICAL at 22:26

## 2023-11-27 RX ADMIN — INSULIN LISPRO 2 UNITS: 100 INJECTION, SOLUTION INTRAVENOUS; SUBCUTANEOUS at 22:24

## 2023-11-27 RX ADMIN — LEVOTHYROXINE SODIUM 175 MCG: 175 TABLET ORAL at 05:47

## 2023-11-27 RX ADMIN — INSULIN LISPRO 2 UNITS: 100 INJECTION, SOLUTION INTRAVENOUS; SUBCUTANEOUS at 12:38

## 2023-11-27 RX ADMIN — ATORVASTATIN CALCIUM 80 MG: 40 TABLET, FILM COATED ORAL at 22:24

## 2023-11-27 RX ADMIN — HYDROCODONE BITARTRATE AND ACETAMINOPHEN 1 TABLET: 10; 325 TABLET ORAL at 22:24

## 2023-11-27 RX ADMIN — HEPARIN SODIUM 5000 UNITS: 5000 INJECTION INTRAVENOUS; SUBCUTANEOUS at 14:47

## 2023-11-27 RX ADMIN — HYDROCODONE BITARTRATE AND ACETAMINOPHEN 1 TABLET: 5; 325 TABLET ORAL at 09:23

## 2023-11-27 RX ADMIN — HYDROXYZINE HYDROCHLORIDE 30 MG: 10 TABLET ORAL at 08:40

## 2023-11-27 RX ADMIN — INSULIN LISPRO 2 UNITS: 100 INJECTION, SOLUTION INTRAVENOUS; SUBCUTANEOUS at 08:40

## 2023-11-27 RX ADMIN — HYDROCODONE BITARTRATE AND ACETAMINOPHEN 1 TABLET: 5; 325 TABLET ORAL at 01:14

## 2023-11-27 RX ADMIN — MEROPENEM 500 MG: 500 INJECTION, POWDER, FOR SOLUTION INTRAVENOUS at 05:45

## 2023-11-27 RX ADMIN — PAROXETINE HYDROCHLORIDE 60 MG: 20 TABLET, FILM COATED ORAL at 09:23

## 2023-11-27 RX ADMIN — HEPARIN SODIUM 5000 UNITS: 5000 INJECTION INTRAVENOUS; SUBCUTANEOUS at 05:45

## 2023-11-27 RX ADMIN — Medication 10 ML: at 08:43

## 2023-11-27 RX ADMIN — MEROPENEM 1000 MG: 1 INJECTION, POWDER, FOR SOLUTION INTRAVENOUS at 22:24

## 2023-11-27 RX ADMIN — MICONAZOLE NITRATE 1 APPLICATION: 2 POWDER TOPICAL at 10:22

## 2023-11-27 RX ADMIN — MEROPENEM 1000 MG: 1 INJECTION, POWDER, FOR SOLUTION INTRAVENOUS at 14:48

## 2023-11-27 RX ADMIN — Medication 10 ML: at 22:25

## 2023-11-27 RX ADMIN — HEPARIN SODIUM 5000 UNITS: 5000 INJECTION INTRAVENOUS; SUBCUTANEOUS at 22:24

## 2023-11-27 NOTE — PLAN OF CARE
Goal Outcome Evaluation:   Patient a&ox4. Medicated for c/o pain per MAR. LLE wound cleansed and dressing changed.

## 2023-11-27 NOTE — NURSING NOTE
Patient A/Ox4. Up ad lu. On room air. PRN Norco administered as ordered for pain. Wound care and skin care completed as ordered. No other issues/needs at this time.

## 2023-11-27 NOTE — PROGRESS NOTES
[facility]   Hospitalist Progress Note  Date: 2023  Patient Name: Raymond M Jr Damico  : 1955  MRN: [id number]  Date of admission: 2023  Room/Bed: [facility]/      Subjective   Subjective     Chief Complaint: leg cellulitis    Summary:Raymond M Jr Damico is a 68 y.o. male with hypertension, hypothyroidism, diabetes presented with left leg erythema swelling and fever.  10 days prior he had scraped his left shin superficially with a piece of wood.  The day prior to admission he began having erythema and was brought to the ED and was diagnosed with cellulitis and placed on IV abx.    Interval follow-up: Patient seen and examined resting comfortably no acute events throughout the night vitals are stable serum creatinine has normalized left leg edema and erythema seems slightly improved today continuing with Merrem    All systems reviewed and negative except: Generalized weakness fatigue leg pain      Objective   Objective     Vitals:   Temp:  [97.9 °F (36.6 °C)-98.6 °F (37 °C)] 98.2 °F (36.8 °C)  Heart Rate:  [68-88] 71  Resp:  [18] 18  BP: (132-152)/(61-87) 142/87    Physical Exam   General: Awake, alert, NAD  HENT: NCAT, MMM  Eyes: pupils equal, no scleral icterus  Cardiovascular: RRR, no murmurs   Pulmonary: CTA bilaterally; no wheezes; no conversational dyspnea  Gastrointestinal: S/ND/NT, +BS  Skin: Cellulitic left lower extremity seems somewhat improved today  Result Review    Result Review:  I have personally reviewed these results on 2023    [x]  Laboratory      Lab 23  0424 23  0338 23  0418 23  0625 23  0523 23  0842 23  0520 23  0101 23   WBC 11.19* 10.34 10.92* 10.83* 13.92*  --  13.31*  --   --  16.15*   HEMOGLOBIN 11.2* 10.9* 10.8* 10.9* 10.6*  --  11.4*  --   --  13.0   HEMATOCRIT 35.5* 34.3* 33.7* 33.7* 32.8*  --  35.7*  --   --  39.2   PLATELETS 278 259 238 223 208  --  183  --   --  241   NEUTROS ABS 5.77  6.62 6.33 6.34 9.64*  --  10.40*  --   --  13.21*   IMMATURE GRANS (ABS) 0.98*  --   --  0.42* 0.29*  --  0.12*  --   --  0.15*   LYMPHS ABS 3.01  --   --  2.61 2.42  --  1.80  --   --  1.61   MONOS ABS 0.93*  --   --  1.08* 1.19*  --  0.89  --   --  1.06*   EOS ABS 0.40 0.41* 0.22 0.31 0.32  --  0.05  --   --  0.05   MCV 90.3 89.8 89.2 89.4 89.1  --  90.2  --   --  88.9   SED RATE  --  86*  --   --   --   --   --   --   --  62*   CRP  --  8.60*  --   --   --   --   --   --   --  16.64*   PROCALCITONIN  --   --  0.61* 0.83* 1.31*  --  1.80*  --    < >  --    LACTATE  --   --   --   --   --  1.5 2.2* 2.4*  --  2.2*    < > = values in this interval not displayed.         Lab 11/27/23 0424 11/26/23 0338 11/25/23 0418   SODIUM 136 137 136   POTASSIUM 4.2 3.5 3.7   CHLORIDE 99 101 100   CO2 28.4 24.5 25.0   ANION GAP 8.6 11.5 11.0   BUN 19 20 18   CREATININE 1.12 1.36* 1.26   EGFR 72.0 57.0* 62.5   GLUCOSE 150* 142* 174*   CALCIUM 8.3* 8.2* 8.2*   MAGNESIUM 1.9 1.8 1.5*   PHOSPHORUS 3.1 3.4 3.2         Lab 11/27/23 0424 11/26/23 0338 11/25/23 0418 11/24/23  0625 11/23/23  0523 11/22/23  0520   TOTAL PROTEIN 8.0 7.9 8.0 7.5 7.1 7.3   ALBUMIN 3.3* 3.1* 3.0* 2.8* 3.0* 3.2*   GLOBULIN  --   --   --  4.7 4.1 4.1   ALT (SGPT) 21 18 17 14 10 13   AST (SGOT) 27 23 24 17 14 15   BILIRUBIN 0.5 0.6 0.5 0.5 0.6 0.9   INDIRECT BILIRUBIN  --  0.4 0.3  --   --   --    BILIRUBIN DIRECT <0.2 0.2 0.2  --   --   --    ALK PHOS 91 88 90 [phone] 84 76                     Brief Urine Lab Results       None          [x]  Microbiology   Microbiology Results (last 10 days)       Procedure Component Value - Date/Time    Wound Culture - Wound, Leg, Left [[id number]] Collected: 11/27/23 0936    Lab Status: Preliminary result Specimen: Wound from Leg, Left Updated: 11/27/23 1100     Gram Stain Rare (1+) WBCs seen      No organisms seen    Wound Culture - Wound, Leg, Left [[id number]] Collected: 11/27/23 0115    Lab Status: Preliminary result  Specimen: Wound from Leg, Left Updated: 11/27/23 0421     Gram Stain Occasional WBCs seen      No organisms seen    MRSA Screen, PCR (Inpatient) - Swab, Nares [[id number]]  (Normal) Collected: 11/25/23 1244    Lab Status: Final result Specimen: Swab from Nares Updated: 11/25/23 1358     MRSA PCR No MRSA Detected    Narrative:      The negative predictive value of this diagnostic test is high and should only be used to consider de-escalating anti-MRSA therapy. A positive result may indicate colonization with MRSA and must be correlated clinically.    Wound Culture - Wound, Leg, Left [[id number]] Collected: 11/23/23 1029    Lab Status: Final result Specimen: Wound from Leg, Left Updated: 11/26/23 1107     Wound Culture No growth at 3 days     Gram Stain No WBCs or organisms seen    Blood Culture - Blood, Arm, Right [[id number]]  (Normal) Collected: 11/21/23 2055    Lab Status: Final result Specimen: Blood from Arm, Right Updated: 11/26/23 2215     Blood Culture No growth at 5 days    Blood Culture - Blood, Arm, Right [[id number]]  (Normal) Collected: 11/21/23 2020    Lab Status: Final result Specimen: Blood from Arm, Right Updated: 11/26/23 2031     Blood Culture No growth at 5 days          [x]  Radiology  CT Lower Extremity Left Without Contrast    Result Date: 11/25/2023   Worsening soft tissue edema or cellulitis in the left lower leg.  No evidence of acute osseous abnormality, loculated soft tissue fluid collection or abnormal soft tissue gas collection.     JEFRY HARRISON MD       Electronically Signed and Approved By: JEFRY HARRISON MD on 11/25/2023 at 15:27             CT Lower Extremity Left With Contrast    Result Date: 11/21/2023    1. 2 mm calcification or foreign body in the soft tissues anterior to the mid to distal tibia. 2. Lower leg subcutaneous edema and skin thickening, which may be due to cellulitis.  Ill-defined fluid in the subcutaneous fat of the lateral lower leg, but no rim enhancing fluid  collection is seen. 3. Tricompartmental arthritic changes of the knee and partially included suprapatellar joint effusion.     ALHAJI MARTINEZ MD       Electronically Signed and Approved By: ALHAJI MARTINEZ MD on 11/21/2023 at 22:32            []  EKG/Telemetry   []  Cardiology/Vascular   []  Pathology  []  Old records  []  Other:    Assessment & Plan   Assessment / Plan     Assessment:  Sepsis, present on admission-fever, elevated white count  Cellulitis of the left lower extremity slow to resolve  Mildly elevated creatinine  Elevated lactic acid   Superficial skin cuts from the rabbit nails of the right hand  Intertrigo of the abdominal folds and groin  Hypertension  Hypothyroidism  Type 2 diabetes mellitus  Gout  Hyperlipidemia      Plan:  Left lower extremity seems somewhat improved today continue with intravenous Merrem  Will hold Lasix monitor renal function  Monitor markers of inflammation  Keep left leg elevated with 2-3 pillows  Continue topical wound care  Check a.m. labs  Further treatment contingent upon his hospital course  Likely home in a couple of days will likely need IV antibiotics at time of discharge  Discussed with RN    DVT prophylaxis:  Medical DVT prophylaxis orders are present.    CODE STATUS:   Level Of Support Discussed With: Patient  Code Status (Patient has no pulse and is not breathing): CPR (Attempt to Resuscitate)  Medical Interventions (Patient has pulse or is breathing): Full Support  Electronically signed by ITALO Perez, 11/27/23, 11:55 AM EST.  .      Attending documentation:  I reviewed the above documentation and independently reviewed and rounded and evaluated the patient and discussed the care plan with LISSETTE Ghotra PA-C, I agree with his findings and plan as documented, what I have added to the care plan and modified is as follows in my documentation and my medical decision making; 68-year-old male with diabetes, hospitalized on 11/21/2023 with sepsis secondary to  left lower extremity cellulitis, extensive greater than 30 cm in length involving large portion of his lower left extremity.  Open wound left anterior lower leg after trauma with nonsterile piece of lumber.  Started on broad-spectrum antibiotics.  Progression of blistering gross lower extremity.  Worsening erythema, pursuing CT scan of the left lower extremity 11/25/2023 shows worsening cellulitis on Zosyn/vancomycin regimen, initial wound was dirty, likely soiled contaminated, escalate antibiotics to meropenem.  Interval follow-up: Seen and examined, no acute distress, no acute major night events, cellulitis showing subtle improvements with advancing antibiotics to meropenem.  Erythema mid body, pain remains significant but overall erythema has regressed mildly.  White blood cell count 11,000, creatinine 1.12, potassium 4.2.  Wound culture not growing anything.  No fevers.  Review of systems obtained, all systems reviewed and negative except for generalized fatigue, generalized weakness, left leg pain, swelling, erythema.  On physical exam well-appearing male, no acute distress, laying in bed, regular rate and rhythm, clear to auscultation bilaterally, soft nontender abdomen, left lower extremity with mildly improved erythema and blistering with drainage and blistering advanced from previous day, and warmth.  Assessment as above, plan, continue meropenem.  Watching response to antibiotic treatment.  Stressed the importance of keeping leg elevated.  Continue trending CRP and sed rate, follow-up repeat wound culture, holding Lasix, continue insulin sliding scale coverage,  A.m. labs, full code, DVT prophylaxis with heparin, clinical course to dictate further management, discussed with nurse at the bedside.  More than 80 % of the time of this patient's encounter was performed by me, this included face-to-face time, planning and coordinating, medical decision making and critical thinking personally done by me.                              Electronically signed by Wyatt Leiva MD, 11/27/23, 6:46 PM EST.  Portions of this documentation were transcribed electronically from a voice recognition software.  I confirm all data accurately represents the service(s) I performed at today's visit.

## 2023-11-28 PROBLEM — E03.9 HYPOTHYROIDISM: Status: ACTIVE | Noted: 2023-11-28

## 2023-11-28 PROBLEM — J44.9 CHRONIC OBSTRUCTIVE PULMONARY DISEASE: Status: ACTIVE | Noted: 2023-11-28

## 2023-11-28 LAB
ALBUMIN SERPL-MCNC: 3.3 G/DL (ref 3.5–5.2)
ALP SERPL-CCNC: 85 U/L (ref 39–117)
ALT SERPL W P-5'-P-CCNC: 18 U/L (ref 1–41)
ANION GAP SERPL CALCULATED.3IONS-SCNC: 10.9 MMOL/L (ref 5–15)
AST SERPL-CCNC: 23 U/L (ref 1–40)
BASOPHILS # BLD AUTO: 0.04 10*3/MM3 (ref 0–0.2)
BASOPHILS NFR BLD AUTO: 0.4 % (ref 0–1.5)
BILIRUB CONJ SERPL-MCNC: <0.2 MG/DL (ref 0–0.3)
BILIRUB INDIRECT SERPL-MCNC: ABNORMAL MG/DL
BILIRUB SERPL-MCNC: 0.4 MG/DL (ref 0–1.2)
BUN SERPL-MCNC: 18 MG/DL (ref 8–23)
BUN/CREAT SERPL: 15.1 (ref 7–25)
CALCIUM SPEC-SCNC: 8.4 MG/DL (ref 8.6–10.5)
CHLORIDE SERPL-SCNC: 99 MMOL/L (ref 98–107)
CO2 SERPL-SCNC: 25.1 MMOL/L (ref 22–29)
CREAT SERPL-MCNC: 1.19 MG/DL (ref 0.76–1.27)
CRP SERPL-MCNC: 4.14 MG/DL (ref 0–0.5)
DEPRECATED RDW RBC AUTO: 43.2 FL (ref 37–54)
EGFRCR SERPLBLD CKD-EPI 2021: 67 ML/MIN/1.73
EOSINOPHIL # BLD AUTO: 0.39 10*3/MM3 (ref 0–0.4)
EOSINOPHIL NFR BLD AUTO: 3.9 % (ref 0.3–6.2)
ERYTHROCYTE [DISTWIDTH] IN BLOOD BY AUTOMATED COUNT: 13.1 % (ref 12.3–15.4)
ERYTHROCYTE [SEDIMENTATION RATE] IN BLOOD: 79 MM/HR (ref 0–20)
GLUCOSE BLDC GLUCOMTR-MCNC: 138 MG/DL (ref 70–99)
GLUCOSE BLDC GLUCOMTR-MCNC: 147 MG/DL (ref 70–99)
GLUCOSE BLDC GLUCOMTR-MCNC: 174 MG/DL (ref 70–99)
GLUCOSE BLDC GLUCOMTR-MCNC: 212 MG/DL (ref 70–99)
GLUCOSE SERPL-MCNC: 171 MG/DL (ref 65–99)
HCT VFR BLD AUTO: 34.1 % (ref 37.5–51)
HGB BLD-MCNC: 10.9 G/DL (ref 13–17.7)
IMM GRANULOCYTES # BLD AUTO: 0.81 10*3/MM3 (ref 0–0.05)
IMM GRANULOCYTES NFR BLD AUTO: 8.2 % (ref 0–0.5)
LYMPHOCYTES # BLD AUTO: 2.97 10*3/MM3 (ref 0.7–3.1)
LYMPHOCYTES NFR BLD AUTO: 30 % (ref 19.6–45.3)
MAGNESIUM SERPL-MCNC: 1.9 MG/DL (ref 1.6–2.4)
MCH RBC QN AUTO: 28.7 PG (ref 26.6–33)
MCHC RBC AUTO-ENTMCNC: 32 G/DL (ref 31.5–35.7)
MCV RBC AUTO: 89.7 FL (ref 79–97)
MONOCYTES # BLD AUTO: 0.85 10*3/MM3 (ref 0.1–0.9)
MONOCYTES NFR BLD AUTO: 8.6 % (ref 5–12)
NEUTROPHILS NFR BLD AUTO: 4.84 10*3/MM3 (ref 1.7–7)
NEUTROPHILS NFR BLD AUTO: 48.9 % (ref 42.7–76)
NRBC BLD AUTO-RTO: 0 /100 WBC (ref 0–0.2)
PHOSPHATE SERPL-MCNC: 2.9 MG/DL (ref 2.5–4.5)
PLAT MORPH BLD: NORMAL
PLATELET # BLD AUTO: 292 10*3/MM3 (ref 140–450)
PMV BLD AUTO: 9.5 FL (ref 6–12)
POTASSIUM SERPL-SCNC: 3.7 MMOL/L (ref 3.5–5.2)
PROT SERPL-MCNC: 8 G/DL (ref 6–8.5)
RBC # BLD AUTO: 3.8 10*6/MM3 (ref 4.14–5.8)
RBC MORPH BLD: NORMAL
SODIUM SERPL-SCNC: 135 MMOL/L (ref 136–145)
WBC MORPH BLD: NORMAL
WBC NRBC COR # BLD AUTO: 9.9 10*3/MM3 (ref 3.4–10.8)

## 2023-11-28 PROCEDURE — 25810000003 SODIUM CHLORIDE 0.9 % SOLUTION 500 ML FLEX CONT: Performed by: FAMILY MEDICINE

## 2023-11-28 PROCEDURE — 63710000001 INSULIN LISPRO (HUMAN) PER 5 UNITS: Performed by: PHYSICIAN ASSISTANT

## 2023-11-28 PROCEDURE — 25010000002 MEROPENEM PER 100 MG: Performed by: FAMILY MEDICINE

## 2023-11-28 PROCEDURE — 80048 BASIC METABOLIC PNL TOTAL CA: CPT | Performed by: FAMILY MEDICINE

## 2023-11-28 PROCEDURE — C1751 CATH, INF, PER/CENT/MIDLINE: HCPCS

## 2023-11-28 PROCEDURE — 85652 RBC SED RATE AUTOMATED: CPT | Performed by: FAMILY MEDICINE

## 2023-11-28 PROCEDURE — 86140 C-REACTIVE PROTEIN: CPT | Performed by: FAMILY MEDICINE

## 2023-11-28 PROCEDURE — 84100 ASSAY OF PHOSPHORUS: CPT | Performed by: FAMILY MEDICINE

## 2023-11-28 PROCEDURE — 85025 COMPLETE CBC W/AUTO DIFF WBC: CPT | Performed by: FAMILY MEDICINE

## 2023-11-28 PROCEDURE — 99232 SBSQ HOSP IP/OBS MODERATE 35: CPT | Performed by: FAMILY MEDICINE

## 2023-11-28 PROCEDURE — 82948 REAGENT STRIP/BLOOD GLUCOSE: CPT

## 2023-11-28 PROCEDURE — 02HV33Z INSERTION OF INFUSION DEVICE INTO SUPERIOR VENA CAVA, PERCUTANEOUS APPROACH: ICD-10-PCS | Performed by: FAMILY MEDICINE

## 2023-11-28 PROCEDURE — 80076 HEPATIC FUNCTION PANEL: CPT | Performed by: FAMILY MEDICINE

## 2023-11-28 PROCEDURE — 25010000002 HEPARIN (PORCINE) PER 1000 UNITS: Performed by: STUDENT IN AN ORGANIZED HEALTH CARE EDUCATION/TRAINING PROGRAM

## 2023-11-28 PROCEDURE — 83735 ASSAY OF MAGNESIUM: CPT | Performed by: FAMILY MEDICINE

## 2023-11-28 PROCEDURE — 85007 BL SMEAR W/DIFF WBC COUNT: CPT | Performed by: FAMILY MEDICINE

## 2023-11-28 RX ORDER — SODIUM CHLORIDE 0.9 % (FLUSH) 0.9 %
10 SYRINGE (ML) INJECTION EVERY 12 HOURS SCHEDULED
Status: DISCONTINUED | OUTPATIENT
Start: 2023-11-28 | End: 2023-12-01 | Stop reason: HOSPADM

## 2023-11-28 RX ORDER — SODIUM CHLORIDE 0.9 % (FLUSH) 0.9 %
10 SYRINGE (ML) INJECTION AS NEEDED
Status: DISCONTINUED | OUTPATIENT
Start: 2023-11-28 | End: 2023-12-01 | Stop reason: HOSPADM

## 2023-11-28 RX ORDER — FUROSEMIDE 20 MG/1
20 TABLET ORAL DAILY
Status: DISCONTINUED | OUTPATIENT
Start: 2023-11-28 | End: 2023-12-01 | Stop reason: HOSPADM

## 2023-11-28 RX ORDER — SODIUM CHLORIDE 0.9 % (FLUSH) 0.9 %
20 SYRINGE (ML) INJECTION AS NEEDED
Status: DISCONTINUED | OUTPATIENT
Start: 2023-11-28 | End: 2023-12-01 | Stop reason: HOSPADM

## 2023-11-28 RX ORDER — SODIUM CHLORIDE 9 MG/ML
40 INJECTION, SOLUTION INTRAVENOUS AS NEEDED
Status: DISCONTINUED | OUTPATIENT
Start: 2023-11-28 | End: 2023-12-01 | Stop reason: HOSPADM

## 2023-11-28 RX ADMIN — HYDROCODONE BITARTRATE AND ACETAMINOPHEN 1 TABLET: 5; 325 TABLET ORAL at 19:52

## 2023-11-28 RX ADMIN — MEROPENEM 1000 MG: 1 INJECTION, POWDER, FOR SOLUTION INTRAVENOUS at 05:02

## 2023-11-28 RX ADMIN — Medication 10 ML: at 08:41

## 2023-11-28 RX ADMIN — MEROPENEM 1000 MG: 1 INJECTION, POWDER, FOR SOLUTION INTRAVENOUS at 21:21

## 2023-11-28 RX ADMIN — Medication 10 ML: at 21:21

## 2023-11-28 RX ADMIN — HYDROCODONE BITARTRATE AND ACETAMINOPHEN 1 TABLET: 10; 325 TABLET ORAL at 22:56

## 2023-11-28 RX ADMIN — PANTOPRAZOLE SODIUM 40 MG: 40 TABLET, DELAYED RELEASE ORAL at 05:00

## 2023-11-28 RX ADMIN — MEROPENEM 1000 MG: 1 INJECTION, POWDER, FOR SOLUTION INTRAVENOUS at 13:19

## 2023-11-28 RX ADMIN — SODIUM CHLORIDE 40 ML: 9 INJECTION, SOLUTION INTRAVENOUS at 21:22

## 2023-11-28 RX ADMIN — FUROSEMIDE 20 MG: 20 TABLET ORAL at 13:19

## 2023-11-28 RX ADMIN — PAROXETINE HYDROCHLORIDE 60 MG: 20 TABLET, FILM COATED ORAL at 08:40

## 2023-11-28 RX ADMIN — LEVOTHYROXINE SODIUM 175 MCG: 175 TABLET ORAL at 05:00

## 2023-11-28 RX ADMIN — MICONAZOLE NITRATE 1 APPLICATION: 2 POWDER TOPICAL at 10:16

## 2023-11-28 RX ADMIN — HYDROXYZINE HYDROCHLORIDE 30 MG: 10 TABLET ORAL at 08:40

## 2023-11-28 RX ADMIN — ATORVASTATIN CALCIUM 80 MG: 40 TABLET, FILM COATED ORAL at 21:20

## 2023-11-28 RX ADMIN — HYDROCODONE BITARTRATE AND ACETAMINOPHEN 1 TABLET: 5; 325 TABLET ORAL at 08:40

## 2023-11-28 RX ADMIN — HEPARIN SODIUM 5000 UNITS: 5000 INJECTION INTRAVENOUS; SUBCUTANEOUS at 21:20

## 2023-11-28 RX ADMIN — INSULIN LISPRO 2 UNITS: 100 INJECTION, SOLUTION INTRAVENOUS; SUBCUTANEOUS at 12:18

## 2023-11-28 RX ADMIN — MICONAZOLE NITRATE 1 APPLICATION: 2 POWDER TOPICAL at 21:21

## 2023-11-28 RX ADMIN — INSULIN LISPRO 4 UNITS: 100 INJECTION, SOLUTION INTRAVENOUS; SUBCUTANEOUS at 21:22

## 2023-11-28 RX ADMIN — HEPARIN SODIUM 5000 UNITS: 5000 INJECTION INTRAVENOUS; SUBCUTANEOUS at 05:00

## 2023-11-28 RX ADMIN — HEPARIN SODIUM 5000 UNITS: 5000 INJECTION INTRAVENOUS; SUBCUTANEOUS at 13:19

## 2023-11-28 RX ADMIN — Medication 10 ML: at 12:19

## 2023-11-28 NOTE — PLAN OF CARE
Goal Outcome Evaluation:  Plan of Care Reviewed With: patient           Outcome Evaluation: Patient A/Ox4. On room air. Up ad lu. PRN Norco administered as ordered. Wound care completed as ordered. PICC placed. No other issues/needs at this time.

## 2023-11-28 NOTE — CONSULTS
Procedure: Insertion of Peripherally Inserted Central Catheter    Indications:  Long Term IV therapy, Home IV therapy    Procedure Details   Education, Including what a PICC line is, why it is used, how it is placed, and how to manage and care for the PICC line was completed. Patient's questions and concerns were answered and addressed. Risks of PICC lines, during and after insertion, were discussed, and informed consent was obtained for the procedure.      An active time out was conducted with patient's nurse using 2 patient identifiers.    After using ultrasound to identify an appropriate vein, maximum sterile technique was used including antiseptics, cap, gloves, gown, hand hygiene, mask, and sheet.    PICC SIZE: 4 FR/18G PICC inserted to the R Basilic vein per hospital protocol.   Blood return:  yes    Findings:  Catheter inserted to 42 cm, with 0 cm. Exposed. Mid upper arm circumference is 37 cm.   Catheter was flushed with 20 cc NS. Patient did tolerate procedure well.    Additional information:  3CG technology used to confirm placement    PICC Brochure given to patient with teaching instruction.    Adan Fenton, OTILIO

## 2023-11-28 NOTE — PLAN OF CARE
Goal Outcome Evaluation:           Progress: improving  Outcome Evaluation: patient is alert and oriented x4 and on room air. prn pain medication given per mar. wound and skin care performed per orders. iv antibiotics given per mar. no other issues at this time.

## 2023-11-28 NOTE — PROGRESS NOTES
[facility]   Hospitalist Progress Note  Date: 2023  Patient Name: Raymond M Jr Damico  : 1955  MRN: [id number]  Date of admission: 2023  Room[place]Bed: [facility] [place]/      Subjective   Subjective     Chief Complaint: leg cellulitis    Summary:Raymond M Jr Damico is a 68 y.o. male with hypertension, hypothyroidism, diabetes presented with left leg erythema swelling and fever.  10 days prior he had scraped his left shin superficially with a piece of wood.  The day prior to admission he began having erythema and was brought to the ED and was diagnosed with cellulitis and placed on IV abx.    Interval follow-up: Patient seen and examined no acute events overnight left lower extremity cellulitis appears to be improving continuing with Merrem given significant improvement noted arranging for PICC line and IV Invanz to complete course of antibiotics outpatient.  Serum creatinine is normalized resume home dose of Lasix    All systems reviewed and negative except: Generalized weakness fatigue leg pain      Objective   Objective     Vitals:   Temp:  [97.7 °F (36.5 °C)-98.4 °F (36.9 °C)] 97.9 °F (36.6 °C)  Heart Rate:  [67-73] 71  Resp:  [18] 18  BP: (134-158)/(72-79) 136/72    Physical Exam   General: Awake, alert, NAD  HENT: NCAT, MMM  Eyes: pupils equal, no scleral icterus  Cardiovascular: RRR, no murmurs   Pulmonary: CTA bilaterally; no wheezes; no conversational dyspnea  Gastrointestinal: S/ND/NT, +BS  Skin: Cellulitic left lower extremity seems somewhat improved today  Result Review    Result Review:  I have personally reviewed these results on 2023    [x]  Laboratory      Lab 23  0407 23  0424 23  0338 23  0418 23  0625 23  0625 23  0523 23  0842 23  0520 23  0101 23   WBC 9.90 11.19* 10.34 10.92*  --  10.83* 13.92*  --  13.31*  --   --  16.15*   HEMOGLOBIN 10.9* 11.2* 10.9* 10.8*  --  10.9* 10.6*  --  11.4*  --   --   13.0   HEMATOCRIT 34.1* 35.5* 34.3* 33.7*  --  33.7* 32.8*  --  35.7*  --   --  39.2   PLATELETS 292 278 259 238  --  223 208  --  183  --   --  241   NEUTROS ABS 4.84 5.77 6.62 6.33   < > 6.34 9.64*  --  10.40*  --   --  13.21*   IMMATURE GRANS (ABS) 0.81* 0.98*  --   --   --  0.42* 0.29*  --  0.12*  --   --  0.15*   LYMPHS ABS 2.97 3.01  --   --   --  2.61 2.42  --  1.80  --   --  1.61   MONOS ABS 0.85 0.93*  --   --   --  1.08* 1.19*  --  0.89  --   --  1.06*   EOS ABS 0.39 0.40 0.41* 0.22   < > 0.31 0.32  --  0.05  --   --  0.05   MCV 89.7 90.3 89.8 89.2  --  89.4 89.1  --  90.2  --   --  88.9   SED RATE 79*  --  86*  --   --   --   --   --   --   --   --  62*   CRP 4.14*  --  8.60*  --   --   --   --   --   --   --   --  16.64*   PROCALCITONIN  --   --   --  0.61*  --  0.83* 1.31*  --  1.80*  --    < >  --    LACTATE  --   --   --   --   --   --   --  1.5 2.2* 2.4*  --  2.2*    < > = values in this interval not displayed.         Lab 11/28/23  0407 11/27/23  0424 11/26/23  0338   SODIUM 135* 136 137   POTASSIUM 3.7 4.2 3.5   CHLORIDE 99 99 101   CO2 25.1 28.4 24.5   ANION GAP 10.9 8.6 11.5   BUN 18 19 20   CREATININE 1.19 1.12 1.36*   EGFR 67.0 72.0 57.0*   GLUCOSE 171* 150* 142*   CALCIUM 8.4* 8.3* 8.2*   MAGNESIUM 1.9 1.9 1.8   PHOSPHORUS 2.9 3.1 3.4         Lab 11/28/23  0407 11/27/23  0424 11/26/23  0338 11/25/23  0418 11/24/23  0625 11/24/23  0625 11/23/23  0523 11/22/23  0520   TOTAL PROTEIN 8.0 8.0 7.9 8.0  --  7.5 7.1 7.3   ALBUMIN 3.3* 3.3* 3.1* 3.0*  --  2.8* 3.0* 3.2*   GLOBULIN  --   --   --   --   --  4.7 4.1 4.1   ALT (SGPT) 18 21 18 17  --  14 10 13   AST (SGOT) 23 27 23 24  --  17 14 15   BILIRUBIN 0.4 0.5 0.6 0.5  --  0.5 0.6 0.9   INDIRECT BILIRUBIN  --   --  0.4 0.3  --   --   --   --    BILIRUBIN DIRECT <0.2 <0.2 0.2 0.2   < >  --   --   --    ALK PHOS 85 91 88 90  --  81 84 76    < > = values in this interval not displayed.                     Brief Urine Lab Results       None          [x]   Microbiology   Microbiology Results (last 10 days)       Procedure Component Value - Date/Time    Wound Culture - Wound, Leg, Left [[id number]] Collected: 11/27/23 0936    Lab Status: Preliminary result Specimen: Wound from Leg, Left Updated: 11/28/23 0841     Wound Culture No growth     Gram Stain Rare (1+) WBCs seen      No organisms seen    Wound Culture - Wound, Leg, Left [[id number]] Collected: 11/27/23 0115    Lab Status: Preliminary result Specimen: Wound from Leg, Left Updated: 11/28/23 0823     Wound Culture No growth     Gram Stain Occasional WBCs seen      No organisms seen    MRSA Screen, PCR (Inpatient) - Swab, Nares [[id number]]  (Normal) Collected: 11/25/23 1244    Lab Status: Final result Specimen: Swab from Nares Updated: 11/25/23 1358     MRSA PCR No MRSA Detected    Narrative:      The negative predictive value of this diagnostic test is high and should only be used to consider de-escalating anti-MRSA therapy. A positive result may indicate colonization with MRSA and must be correlated clinically.    Wound Culture - Wound, Leg, Left [[id number]] Collected: 11/23/23 1029    Lab Status: Final result Specimen: Wound from Leg, Left Updated: 11/26/23 1107     Wound Culture No growth at 3 days     Gram Stain No WBCs or organisms seen    Blood Culture - Blood, Arm, Right [[id number]]  (Normal) Collected: 11/21/23 2055    Lab Status: Final result Specimen: Blood from Arm, Right Updated: 11/26/23 2215     Blood Culture No growth at 5 days    Blood Culture - Blood, Arm, Right [[id number]]  (Normal) Collected: 11/21/23 2020    Lab Status: Final result Specimen: Blood from Arm, Right Updated: 11/26/23 2031     Blood Culture No growth at 5 days          [x]  Radiology  CT Lower Extremity Left Without Contrast    Result Date: 11/25/2023   Worsening soft tissue edema or cellulitis in the left lower leg.  No evidence of acute osseous abnormality, loculated soft tissue fluid collection or abnormal soft tissue gas  collection.     JEFRY HARRISON MD       Electronically Signed and Approved By: JEFRY HARRISON MD on 11/25/2023 at 15:27             CT Lower Extremity Left With Contrast    Result Date: 11/21/2023    1. 2 mm calcification or foreign body in the soft tissues anterior to the mid to distal tibia. 2. Lower leg subcutaneous edema and skin thickening, which may be due to cellulitis.  Ill-defined fluid in the subcutaneous fat of the lateral lower leg, but no rim enhancing fluid collection is seen. 3. Tricompartmental arthritic changes of the knee and partially included suprapatellar joint effusion.     ALHAJI MARTNIEZ MD       Electronically Signed and Approved By: ALHAJI MARTINEZ MD on 11/21/2023 at 22:32            []  EKG/Telemetry   []  Cardiology/Vascular   []  Pathology  []  Old records  []  Other:    Assessment & Plan   Assessment / Plan     Assessment:  Sepsis, present on admission-fever, elevated white count  Cellulitis of the left lower extremity slow to resolve  Mildly elevated creatinine  Elevated lactic acid   Superficial skin cuts from the rabbit nails of the right hand  Intertrigo of the abdominal folds and groin  Hypertension  Hypothyroidism  Type 2 diabetes mellitus  Gout  Hyperlipidemia      Plan:  Left lower extremity cellulitis continues to improve continue with Merrem arrange for PICC line and outpatient antibiotics with IV Invanz  Renal function improved Lasix has been on hold we will resume home dose of Lasix 20 mg daily  Monitor markers of inflammation  Keep left leg elevated with 2-3 pillows  Continue topical wound care  Check a.m. labs  Further treatment contingent upon his hospital course  Likely home 24 to 48 hours once outpatient IV antibiotics have been arranged  Discussed with RN    DVT prophylaxis:  Medical DVT prophylaxis orders are present.    CODE STATUS:   Level Of Support Discussed With: Patient  Code Status (Patient has no pulse and is not breathing): CPR (Attempt to  Resuscitate)  Medical Interventions (Patient has pulse or is breathing): Full Support  Electronically signed by ITALO Perez, 11/28/23, 12:07 PM EST.    Attending documentation:  I reviewed the above documentation and independently reviewed and rounded and evaluated the patient and discussed the care plan with LISSETTE Ghotra PA-C, I agree with his findings and plan as documented, what I have added to the care plan and modified is as follows in my documentation and my medical decision making; 68-year-old male with diabetes, hospitalized on 11/21/2023 with sepsis secondary to left lower extremity cellulitis, extensive greater than 30 cm in length involving large portion of his lower left extremity.  Open wound left anterior lower leg after trauma with nonsterile piece of lumber.  Started on broad-spectrum antibiotics.  Progression of blistering gross lower extremity.  Worsening erythema, pursuing CT scan of the left lower extremity 11/25/2023 shows worsening cellulitis on Zosyn/vancomycin regimen, initial wound was dirty, likely soiled contaminated, escalate antibiotics to meropenem.  Interval follow-up: Seen and examined, no acute distress, no acute major night events, cellulitis showing continued improvements with advanced antibiotics to meropenem.  Pain is more tolerable, there is less blistering.  White blood cell count down to 9000, creatinine 1.19, sodium 135, blood sugars in the 100 range.  Sed rate down to 79.  Wound culture still have no growth.  Review of systems obtained, all systems reviewed and negative except for generalized fatigue, generalized weakness, left leg pain, swelling, erythema somewhat improved.  On physical exam well-appearing male, no acute distress, laying in bed, regular rate and rhythm, clear to auscultation bilaterally, soft nontender abdomen, left lower extremity with further improved erythema and blistering with drainage and blistering advanced from previous day, and less  warmth.  Assessment as above, plan, continue meropenem.  Watching response to antibiotic treatment over the next 24 hours.  PICC line placement.  Stressed the importance of keeping leg elevated.  Continue trending CRP and sed rate, continue insulin sliding scale coverage,  A.m. labs, full code, DVT prophylaxis with heparin, clinical course to dictate further management, discussed with nurse at the bedside.  More than 80 % of the time of this patient's encounter was performed by me, this included face-to-face time, planning and coordinating, medical decision making and critical thinking personally done by me.    Electronically signed by Wyatt Leiva MD, 11/28/23, 5:09 PM EST.  Portions of this documentation were transcribed electronically from a voice recognition software.  I confirm all data accurately represents the service(s) I performed at today's visit.

## 2023-11-29 LAB
ALBUMIN SERPL-MCNC: 3.4 G/DL (ref 3.5–5.2)
ALP SERPL-CCNC: 81 U/L (ref 39–117)
ALT SERPL W P-5'-P-CCNC: 16 U/L (ref 1–41)
ANION GAP SERPL CALCULATED.3IONS-SCNC: 7.1 MMOL/L (ref 5–15)
AST SERPL-CCNC: 22 U/L (ref 1–40)
BILIRUB CONJ SERPL-MCNC: <0.2 MG/DL (ref 0–0.3)
BILIRUB INDIRECT SERPL-MCNC: ABNORMAL MG/DL
BILIRUB SERPL-MCNC: 0.4 MG/DL (ref 0–1.2)
BUN SERPL-MCNC: 18 MG/DL (ref 8–23)
BUN/CREAT SERPL: 16.1 (ref 7–25)
CALCIUM SPEC-SCNC: 8.6 MG/DL (ref 8.6–10.5)
CHLORIDE SERPL-SCNC: 101 MMOL/L (ref 98–107)
CO2 SERPL-SCNC: 27.9 MMOL/L (ref 22–29)
CREAT SERPL-MCNC: 1.12 MG/DL (ref 0.76–1.27)
DEPRECATED RDW RBC AUTO: 43 FL (ref 37–54)
EGFRCR SERPLBLD CKD-EPI 2021: 72 ML/MIN/1.73
EOSINOPHIL # BLD MANUAL: 0.27 10*3/MM3 (ref 0–0.4)
EOSINOPHIL NFR BLD MANUAL: 3 % (ref 0.3–6.2)
ERYTHROCYTE [DISTWIDTH] IN BLOOD BY AUTOMATED COUNT: 13.1 % (ref 12.3–15.4)
GLUCOSE BLDC GLUCOMTR-MCNC: 130 MG/DL (ref 70–99)
GLUCOSE BLDC GLUCOMTR-MCNC: 172 MG/DL (ref 70–99)
GLUCOSE BLDC GLUCOMTR-MCNC: 173 MG/DL (ref 70–99)
GLUCOSE BLDC GLUCOMTR-MCNC: 224 MG/DL (ref 70–99)
GLUCOSE SERPL-MCNC: 176 MG/DL (ref 65–99)
HCT VFR BLD AUTO: 33.2 % (ref 37.5–51)
HGB BLD-MCNC: 10.6 G/DL (ref 13–17.7)
LYMPHOCYTES # BLD MANUAL: 3.33 10*3/MM3 (ref 0.7–3.1)
LYMPHOCYTES NFR BLD MANUAL: 8 % (ref 5–12)
MAGNESIUM SERPL-MCNC: 1.9 MG/DL (ref 1.6–2.4)
MCH RBC QN AUTO: 28.8 PG (ref 26.6–33)
MCHC RBC AUTO-ENTMCNC: 31.9 G/DL (ref 31.5–35.7)
MCV RBC AUTO: 90.2 FL (ref 79–97)
MONOCYTES # BLD: 0.72 10*3/MM3 (ref 0.1–0.9)
MYELOCYTES NFR BLD MANUAL: 2 % (ref 0–0)
NEUTROPHILS # BLD AUTO: 4.5 10*3/MM3 (ref 1.7–7)
NEUTROPHILS NFR BLD MANUAL: 50 % (ref 42.7–76)
PHOSPHATE SERPL-MCNC: 3.3 MG/DL (ref 2.5–4.5)
PLATELET # BLD AUTO: 308 10*3/MM3 (ref 140–450)
PMV BLD AUTO: 9.5 FL (ref 6–12)
POTASSIUM SERPL-SCNC: 3.7 MMOL/L (ref 3.5–5.2)
PROT SERPL-MCNC: 8.1 G/DL (ref 6–8.5)
RBC # BLD AUTO: 3.68 10*6/MM3 (ref 4.14–5.8)
RBC MORPH BLD: NORMAL
SMALL PLATELETS BLD QL SMEAR: ADEQUATE
SODIUM SERPL-SCNC: 136 MMOL/L (ref 136–145)
VARIANT LYMPHS NFR BLD MANUAL: 37 % (ref 19.6–45.3)
WBC MORPH BLD: NORMAL
WBC NRBC COR # BLD AUTO: 8.99 10*3/MM3 (ref 3.4–10.8)

## 2023-11-29 PROCEDURE — 83735 ASSAY OF MAGNESIUM: CPT | Performed by: FAMILY MEDICINE

## 2023-11-29 PROCEDURE — 85007 BL SMEAR W/DIFF WBC COUNT: CPT | Performed by: FAMILY MEDICINE

## 2023-11-29 PROCEDURE — 25010000002 MEROPENEM PER 100 MG: Performed by: FAMILY MEDICINE

## 2023-11-29 PROCEDURE — 80076 HEPATIC FUNCTION PANEL: CPT | Performed by: FAMILY MEDICINE

## 2023-11-29 PROCEDURE — 85025 COMPLETE CBC W/AUTO DIFF WBC: CPT | Performed by: FAMILY MEDICINE

## 2023-11-29 PROCEDURE — 97161 PT EVAL LOW COMPLEX 20 MIN: CPT

## 2023-11-29 PROCEDURE — 80048 BASIC METABOLIC PNL TOTAL CA: CPT | Performed by: FAMILY MEDICINE

## 2023-11-29 PROCEDURE — 99232 SBSQ HOSP IP/OBS MODERATE 35: CPT | Performed by: FAMILY MEDICINE

## 2023-11-29 PROCEDURE — 82948 REAGENT STRIP/BLOOD GLUCOSE: CPT

## 2023-11-29 PROCEDURE — 84100 ASSAY OF PHOSPHORUS: CPT | Performed by: FAMILY MEDICINE

## 2023-11-29 PROCEDURE — 25010000002 HEPARIN (PORCINE) PER 1000 UNITS: Performed by: STUDENT IN AN ORGANIZED HEALTH CARE EDUCATION/TRAINING PROGRAM

## 2023-11-29 PROCEDURE — 63710000001 INSULIN LISPRO (HUMAN) PER 5 UNITS: Performed by: PHYSICIAN ASSISTANT

## 2023-11-29 RX ADMIN — PAROXETINE HYDROCHLORIDE 60 MG: 20 TABLET, FILM COATED ORAL at 08:41

## 2023-11-29 RX ADMIN — INSULIN LISPRO 2 UNITS: 100 INJECTION, SOLUTION INTRAVENOUS; SUBCUTANEOUS at 18:30

## 2023-11-29 RX ADMIN — INSULIN LISPRO 4 UNITS: 100 INJECTION, SOLUTION INTRAVENOUS; SUBCUTANEOUS at 20:43

## 2023-11-29 RX ADMIN — INSULIN LISPRO 2 UNITS: 100 INJECTION, SOLUTION INTRAVENOUS; SUBCUTANEOUS at 12:45

## 2023-11-29 RX ADMIN — MEROPENEM 1000 MG: 1 INJECTION, POWDER, FOR SOLUTION INTRAVENOUS at 05:07

## 2023-11-29 RX ADMIN — Medication 10 ML: at 20:43

## 2023-11-29 RX ADMIN — HEPARIN SODIUM 5000 UNITS: 5000 INJECTION INTRAVENOUS; SUBCUTANEOUS at 05:07

## 2023-11-29 RX ADMIN — FUROSEMIDE 20 MG: 20 TABLET ORAL at 08:41

## 2023-11-29 RX ADMIN — Medication 10 ML: at 08:40

## 2023-11-29 RX ADMIN — MICONAZOLE NITRATE 1 APPLICATION: 2 POWDER TOPICAL at 08:41

## 2023-11-29 RX ADMIN — KETOCONAZOLE 1 APPLICATION: 20 CREAM TOPICAL at 08:40

## 2023-11-29 RX ADMIN — MEROPENEM 1000 MG: 1 INJECTION, POWDER, FOR SOLUTION INTRAVENOUS at 20:55

## 2023-11-29 RX ADMIN — MEROPENEM 1000 MG: 1 INJECTION, POWDER, FOR SOLUTION INTRAVENOUS at 14:12

## 2023-11-29 RX ADMIN — HEPARIN SODIUM 5000 UNITS: 5000 INJECTION INTRAVENOUS; SUBCUTANEOUS at 14:12

## 2023-11-29 RX ADMIN — PANTOPRAZOLE SODIUM 40 MG: 40 TABLET, DELAYED RELEASE ORAL at 05:07

## 2023-11-29 RX ADMIN — ATORVASTATIN CALCIUM 80 MG: 40 TABLET, FILM COATED ORAL at 20:41

## 2023-11-29 RX ADMIN — HYDROXYZINE HYDROCHLORIDE 30 MG: 10 TABLET ORAL at 08:41

## 2023-11-29 RX ADMIN — Medication 10 ML: at 08:41

## 2023-11-29 RX ADMIN — LEVOTHYROXINE SODIUM 175 MCG: 175 TABLET ORAL at 05:07

## 2023-11-29 RX ADMIN — HYDROCODONE BITARTRATE AND ACETAMINOPHEN 1 TABLET: 10; 325 TABLET ORAL at 20:41

## 2023-11-29 RX ADMIN — HEPARIN SODIUM 5000 UNITS: 5000 INJECTION INTRAVENOUS; SUBCUTANEOUS at 20:42

## 2023-11-29 RX ADMIN — MICONAZOLE NITRATE 1 APPLICATION: 2 POWDER TOPICAL at 20:43

## 2023-11-29 RX ADMIN — HYDROCODONE BITARTRATE AND ACETAMINOPHEN 1 TABLET: 5; 325 TABLET ORAL at 12:49

## 2023-11-29 NOTE — PROGRESS NOTES
[facility]   Hospitalist Progress Note  Date: 2023  Patient Name: Raymond M Jr Damico  : 1955  MRN: [id number]  Date of admission: 2023  Room/Bed: [facility]/      Subjective   Subjective     Chief Complaint: leg cellulitis    Summary:Raymond M Jr Damico is a 68 y.o. male with hypertension, hypothyroidism, diabetes presented with left leg erythema swelling and fever.  10 days prior he had scraped his left shin superficially with a piece of wood.  The day prior to admission he began having erythema and was brought to the ED and was diagnosed with cellulitis and placed on IV abx.    Interval follow-up:   2023    Up in chair.  Wife at bedside.  LLE erythema receding.    RUE PICC line in place.    Patient feeling better.  Ambulating short distances in the luis.  His goal is to make it to daughter's wedding this weekend.  VSS  No fevers   White count better: 16k ---> 8.9k  Glucose controlled  Tolerating meropenem (will need until 23)  Has PICC line.    DME tba: bariatric wheelchair.      All systems reviewed and negative except: Generalized weakness fatigue leg pain      Objective   Objective     Vitals:   Temp:  [97.8 °F (36.6 °C)-98.2 °F (36.8 °C)] 97.8 °F (36.6 °C)  Heart Rate:  [69-81] 72  Resp:  [18] 18  BP: (112-168)/(61-90) 135/69    Physical Exam   General: Awake, alert, NAD  HENT: NCAT, MMM  Eyes: pupils equal, no scleral icterus  Cardiovascular: RRR, no murmurs   Pulmonary: CTA bilaterally; no wheezes; no conversational dyspnea  Gastrointestinal: S/ND/NT, +BS  Skin: Cellulitic left lower extremity seems somewhat improved today  Result Review    Result Review:  I have personally reviewed these results on 2023    [x]  Laboratory      Lab 23  0522 23  0407 23  0424 23  0338 23  0418 23  0625 23  0625 23  0523   WBC 8.99 9.90 11.19* 10.34 10.92*  --  10.83* 13.92*   HEMOGLOBIN 10.6* 10.9* 11.2* 10.9* 10.8*  --  10.9* 10.6*    HEMATOCRIT 33.2* 34.1* 35.5* 34.3* 33.7*  --  33.7* 32.8*   PLATELETS 308 292 278 259 238  --  223 208   NEUTROS ABS 4.50 4.84 5.77 6.62 6.33   < > 6.34 9.64*   IMMATURE GRANS (ABS)  --  0.81* 0.98*  --   --   --  0.42* 0.29*   LYMPHS ABS  --  2.97 3.01  --   --   --  2.61 2.42   MONOS ABS  --  0.85 0.93*  --   --   --  1.08* 1.19*   EOS ABS 0.27 0.39 0.40 0.41* 0.22   < > 0.31 0.32   MCV 90.2 89.7 90.3 89.8 89.2  --  89.4 89.1   SED RATE  --  79*  --  86*  --   --   --   --    CRP  --  4.14*  --  8.60*  --   --   --   --    PROCALCITONIN  --   --   --   --  0.61*  --  0.83* 1.31*    < > = values in this interval not displayed.         Lab 11/29/23 0522 11/28/23 0407 11/27/23 0424   SODIUM 136 135* 136   POTASSIUM 3.7 3.7 4.2   CHLORIDE 101 99 99   CO2 27.9 25.1 28.4   ANION GAP 7.1 10.9 8.6   BUN 18 18 19   CREATININE 1.12 1.19 1.12   EGFR 72.0 67.0 72.0   GLUCOSE 176* 171* 150*   CALCIUM 8.6 8.4* 8.3*   MAGNESIUM 1.9 1.9 1.9   PHOSPHORUS 3.3 2.9 3.1         Lab 11/29/23  0522 11/28/23  0407 11/27/23  0424 11/26/23  0338 11/25/23  0418 11/24/23  0625 11/24/23  0625 11/23/23  0523   TOTAL PROTEIN 8.1 8.0 8.0 7.9 8.0  --  7.5 7.1   ALBUMIN 3.4* 3.3* 3.3* 3.1* 3.0*  --  2.8* 3.0*   GLOBULIN  --   --   --   --   --   --  4.7 4.1   ALT (SGPT) 16 18 21 18 17  --  14 10   AST (SGOT) 22 23 27 23 24  --  17 14   BILIRUBIN 0.4 0.4 0.5 0.6 0.5  --  0.5 0.6   INDIRECT BILIRUBIN  --   --   --  0.4 0.3  --   --   --    BILIRUBIN DIRECT <0.2 <0.2 <0.2 0.2 0.2   < >  --   --    ALK PHOS 81 85 91 88 90  --  81 84    < > = values in this interval not displayed.                     Brief Urine Lab Results       None          [x]  Microbiology   Microbiology Results (last 10 days)       Procedure Component Value - Date/Time    Wound Culture - Wound, Leg, Left [[id number]] Collected: 11/27/23 0936    Lab Status: Preliminary result Specimen: Wound from Leg, Left Updated: 11/29/23 1003     Wound Culture No growth at 2 days     Gram  Stain Rare (1+) WBCs seen      No organisms seen    Wound Culture - Wound, Leg, Left [[id number]] Collected: 11/27/23 0115    Lab Status: Preliminary result Specimen: Wound from Leg, Left Updated: 11/29/23 1003     Wound Culture No growth at 2 days     Gram Stain Occasional WBCs seen      No organisms seen    MRSA Screen, PCR (Inpatient) - Swab, Nares [[id number]]  (Normal) Collected: 11/25/23 1244    Lab Status: Final result Specimen: Swab from Nares Updated: 11/25/23 1358     MRSA PCR No MRSA Detected    Narrative:      The negative predictive value of this diagnostic test is high and should only be used to consider de-escalating anti-MRSA therapy. A positive result may indicate colonization with MRSA and must be correlated clinically.    Wound Culture - Wound, Leg, Left [[id number]] Collected: 11/23/23 1029    Lab Status: Final result Specimen: Wound from Leg, Left Updated: 11/26/23 1107     Wound Culture No growth at 3 days     Gram Stain No WBCs or organisms seen    Blood Culture - Blood, Arm, Right [[id number]]  (Normal) Collected: 11/21/23 2055    Lab Status: Final result Specimen: Blood from Arm, Right Updated: 11/26/23 2215     Blood Culture No growth at 5 days    Blood Culture - Blood, Arm, Right [[id number]]  (Normal) Collected: 11/21/23 2020    Lab Status: Final result Specimen: Blood from Arm, Right Updated: 11/26/23 2031     Blood Culture No growth at 5 days          [x]  Radiology  CT Lower Extremity Left Without Contrast    Result Date: 11/25/2023   Worsening soft tissue edema or cellulitis in the left lower leg.  No evidence of acute osseous abnormality, loculated soft tissue fluid collection or abnormal soft tissue gas collection.     JEFRY HARRISON MD       Electronically Signed and Approved By: JEFRY HARRISON MD on 11/25/2023 at 15:27             CT Lower Extremity Left With Contrast    Result Date: 11/21/2023    1. 2 mm calcification or foreign body in the soft tissues anterior to the mid to  distal tibia. 2. Lower leg subcutaneous edema and skin thickening, which may be due to cellulitis.  Ill-defined fluid in the subcutaneous fat of the lateral lower leg, but no rim enhancing fluid collection is seen. 3. Tricompartmental arthritic changes of the knee and partially included suprapatellar joint effusion.     ALHAJI MARTINEZ MD       Electronically Signed and Approved By: ALHAJI MARTINEZ MD on 11/21/2023 at 22:32            []  EKG/Telemetry   []  Cardiology/Vascular   []  Pathology  []  Old records  []  Other:      Assessment & Plan   Assessment / Plan     Assessment:  Sepsis, present on admission-fever, elevated white count  Cellulitis of the left lower extremity slow to resolve  Mildly elevated creatinine  Elevated lactic acid   Superficial skin cuts from the rabbit nails of the right hand  Intertrigo of the abdominal folds and groin  Hypertension  Hypothyroidism  Type 2 diabetes mellitus  Gout  Hyperlipidemia  Morbid obesity with BMI >43      Plan:  Left lower extremity cellulitis continues to improve continue with Merrem arrange for PICC line and outpatient antibiotics with IV Invanz  Renal function improved Lasix has been on hold we will resume home dose of Lasix 20 mg daily  Monitor markers of inflammation  Keep left leg elevated with 2-3 pillows  CT lower extremity repeated on 11/25/23 showing worsening soft tissue edema / cellulitis in the left lower leg.  No evidence of acute osseous abnormality, loculated soft tissue fluid collection or abnormal soft tissue gas collection.  Continue topical wound care  Check a.m. labs  Further treatment contingent upon his hospital course  Likely home 24 to 48 hours once outpatient IV antibiotics have been arranged  Discussed with RN    DVT prophylaxis:  Medical DVT prophylaxis orders are present.    CODE STATUS:   Level Of Support Discussed With: Patient  Code Status (Patient has no pulse and is not breathing): CPR (Attempt to Resuscitate)  Medical  Interventions (Patient has pulse or is breathing): Full Support      Attending documentation:  I reviewed the above documentation and independently reviewed and rounded and evaluated the patient and discussed the care plan with DIAMANTE Alcantara PA-C, I agree with his findings and plan as documented, what I have added to the care plan and modified is as follows in my documentation and my medical decision making; 68-year-old male with diabetes, hospitalized on 11/21/2023 with sepsis secondary to left lower extremity cellulitis, extensive greater than 30 cm in length involving large portion of his lower left extremity.  Open wound left anterior lower leg after trauma with nonsterile piece of lumber.  Started on broad-spectrum antibiotics.  Progression of blistering gross lower extremity.  Worsening erythema, pursuing CT scan of the left lower extremity 11/25/2023 shows worsening cellulitis on Zosyn/vancomycin regimen, initial wound was dirty, likely soiled contaminated, escalate antibiotics to meropenem.  Interval follow-up: Seen and examined, no acute distress, no acute major night events, cellulitis showing further improvements with meropenem.  Pain is tolerable, no new blistering.  White blood cell count 8000, hemoglobin 10.6, creatinine 1.12, blood sugars in the 100 range.  Review of systems obtained, all systems reviewed and negative except for generalized fatigue, generalized weakness, left leg pain, swelling, erythema further improved.  On physical exam well-appearing male, no acute distress, laying in bed, regular rate and rhythm, clear to auscultation bilaterally, soft nontender abdomen, left lower extremity with erythema and closed blistering trace warmth.  Assessment as above, plan, continue meropenem.  Watching response to antibiotic treatment over the next 24 hours while [facility] IV Invanz to treat cellulitis which has failed to improve with other antibiotic.  PICC line care.   Stressed the importance of keeping leg elevated.  Continue trending CRP and sed rate, continue insulin sliding scale coverage,  A.m. labs, full code, DVT prophylaxis with heparin, clinical course to dictate further management, discussed with nurse at the bedside.  More than 75 % of the time of this patient's encounter was performed by me, this included face-to-face time, planning and coordinating, medical decision making and critical thinking personally done by me.         Electronically signed by Wyatt Leiva MD, 11/29/23, 4:46 PM EST.  Portions of this documentation were transcribed electronically from a voice recognition software.  I confirm all data accurately represents the service(s) I performed at today's visit.

## 2023-11-29 NOTE — PLAN OF CARE
Goal Outcome Evaluation:      Patient complained of pain x1, and medicated per orders, vitals stable.

## 2023-11-29 NOTE — PLAN OF CARE
Goal Outcome Evaluation:           Progress: improving  Outcome Evaluation: patient is alert and oriented x4 and on room air. up ad lu. PRN pain medication given for pain in the left lower extremity. patient requested wound and skin care not be performed until his wife comes and helps him take a shower around 8am this morning. possible d/c today or tomorrow. no other issues at this time.

## 2023-11-29 NOTE — PLAN OF CARE
Goal Outcome Evaluation:  Plan of Care Reviewed With: (P) patient           Outcome Evaluation: (P) Pt presents with minimal impairments in endurance/activity tolerance affecting stamina with ambulation. He is operating at his baseline and does not require skilled PT services at this time. He was educated on safe ambulation in his room and the hallway as tolerated to maintain strength and function.      Anticipated Discharge Disposition (PT): (P) home with assist

## 2023-11-29 NOTE — THERAPY EVALUATION
"Acute Care - Physical Therapy Initial Evaluation   Rina     Patient Name: Raymond M Jr Damico  : 1955  MRN: [id number]  Today's Date: 2023      Visit Dx:     ICD-10-CM ICD-9-CM   1. Sepsis, due to unspecified organism, unspecified whether acute organ dysfunction present  A41.9 038.9     995.91   2. Cellulitis of left lower extremity  L03.116 682.6   3. Difficulty walking  R26.2 719.7     Patient Active Problem List   Diagnosis    Cellulitis of left lower extremity    Chronic obstructive pulmonary disease    Hypothyroidism     Past Medical History:   Diagnosis Date    Callus     COPD (chronic obstructive pulmonary disease)     Diabetes mellitus     Gout     Hypertension      Past Surgical History:   Procedure Laterality Date    CARPAL TUNNEL RELEASE      EYE SURGERY      JOINT REPLACEMENT       PT Assessment (last 12 hours)       PT Evaluation and Treatment       Row Name 23 1100          Physical Therapy Time and Intention    Subjective Information complains of;swelling (P)   L leg  -RR     Document Type evaluation (P)   -RR     Mode of Treatment individual therapy;physical therapy (P)   -RR     Patient Effort excellent (P)   -RR     Symptoms Noted During/After Treatment other (see comments) (P)   felt \"wobbly\"  -RR       Row Name 23 1100          General Information    Patient Profile Reviewed yes (P)   -RR     Patient Observations alert;cooperative;agree to therapy (P)   -RR     Prior Level of Function independent:;gait;transfer;bed mobility;ADL's (P)   -RR     Equipment Currently Used at Home rollator (P)   for longer distance ambulation, also has a wheelchair but doesn't use it  -RR     Existing Precautions/Restrictions fall (P)   -RR     Barriers to Rehab none identified (P)   -RR       Row Name 23 1100          Living Environment    Current Living Arrangements home (P)   -RR     Home Accessibility stairs to enter home (P)   -RR     People in Home spouse;child(rock), " "adult;child(rock), dependent (P)   -     Primary Care Provided by self (P)   -       Row Name 11/29/23 1100          Home Main Entrance    Number of Stairs, Main Entrance two (P)   -       Row Name 11/29/23 1100          Home Use of Assistive/Adaptive Equipment    Equipment Currently Used at Home rollator (P)   -       Row Name 11/29/23 1100          Cognition    Orientation Status (Cognition) oriented x 4 (P)   -     Personal Safety Interventions gait belt;nonskid shoes/slippers when out of bed (P)   -       Row Name 11/29/23 1100          Range of Motion Comprehensive    General Range of Motion bilateral lower extremity ROM WFL (P)   -       Row Name 11/29/23 1100          Strength (Manual Muscle Testing)    Strength (Manual Muscle Testing) bilateral lower extremities;strength is WFL (P)   5/5  -       Row Name 11/29/23 1100          Bed Mobility    Bed Mobility supine-sit (P)   -     Supine-Sit Sibley (Bed Mobility) supervision (P)   -       Row Name 11/29/23 1100          Transfers    Transfers sit-stand transfer (P)   -       Row Name 11/29/23 1100          Sit-Stand Transfer    Sit-Stand Sibley (Transfers) standby assist (P)   -     Assistive Device (Sit-Stand Transfers) -- (P)   None used  -       Row Name 11/29/23 1100          Gait/Stairs (Locomotion)    Gait/Stairs Locomotion gait/ambulation independence (P)   -     Sibley Level (Gait) standby assist (P)   -     Assistive Device (Gait) -- (P)   None used  -     Patient was able to Ambulate yes (P)   -RR     Distance in Feet (Gait) 100 (P)   -RR     Comment, (Gait/Stairs) Pt required 1 min of rest at [facility] point of ambulation with no AD. He reported feeling \"wobbly\" with minimally increased postural sway but had no loss of balance requiring assist. (P)   -       Row Name 11/29/23 1100          Safety Issues, Functional Mobility    Impairments Affecting Function (Mobility) endurance/activity tolerance (P)  "  -RR       Row Name 11/29/23 1100          Balance    Balance Assessment standing dynamic balance (P)   -RR     Dynamic Standing Balance standby assist (P)   -RR     Position/Device Used, Standing Balance unsupported (P)   -RR       Row Name             Wound 11/22/23 0150 Left lower leg Other (comment)    Wound - Properties Group Placement Date: 11/22/23  -KS Placement Time: 0150  -KS Present on Original Admission: Y  -KS Side: Left  -KS Orientation: lower  -KS Location: leg  -KS Primary Wound Type: Other  -KS, cellulitis     Retired Wound - Properties Group Placement Date: 11/22/23  -KS Placement Time: 0150  -KS Present on Original Admission: Y  -KS Side: Left  -KS Orientation: lower  -KS Location: leg  -KS Primary Wound Type: Other  -KS, cellulitis     Retired Wound - Properties Group Date first assessed: 11/22/23  -KS Time first assessed: 0150  -KS Present on Original Admission: Y  -KS Side: Left  -KS Location: leg  -KS Primary Wound Type: Other  -KS, cellulitis       Row Name             Wound 11/22/23 0150 Left anterior hip MASD (Moisture associated skin damage)    Wound - Properties Group Placement Date: 11/22/23  -KS Placement Time: 0150  -KS Present on Original Admission: Y  -KS Side: Left  -KS Orientation: anterior  -KS Location: hip  -KS Primary Wound Type: MASD  -KS    Retired Wound - Properties Group Placement Date: 11/22/23  -KS Placement Time: 0150  -KS Present on Original Admission: Y  -KS Side: Left  -KS Orientation: anterior  -KS Location: hip  -KS Primary Wound Type: MASD  -KS    Retired Wound - Properties Group Date first assessed: 11/22/23  -KS Time first assessed: 0150  -KS Present on Original Admission: Y  -KS Side: Left  -KS Location: hip  -KS Primary Wound Type: MASD  -KS      Row Name             Wound 11/22/23 0150 Right anterior hip MASD (Moisture associated skin damage)    Wound - Properties Group Placement Date: 11/22/23  -KS Placement Time: 0150  -KS Present on Original Admission: Y   -KS Side: Right  -KS Orientation: anterior  -KS Location: hip  -KS Primary Wound Type: MASD  -KS    Retired Wound - Properties Group Placement Date: 11/22/23  -KS Placement Time: 0150  -KS Present on Original Admission: Y  -KS Side: Right  -KS Orientation: anterior  -KS Location: hip  -KS Primary Wound Type: MASD  -KS    Retired Wound - Properties Group Date first assessed: 11/22/23  -KS Time first assessed: 0150  -KS Present on Original Admission: Y  -KS Side: Right  -KS Location: hip  -KS Primary Wound Type: MASD  -KS      Row Name             Wound 11/22/23 0150 Right lower arm Skin Tear    Wound - Properties Group Placement Date: 11/22/23  -KS Placement Time: 0150  -KS Present on Original Admission: Y  -KS Side: Right  -KS Orientation: lower  -KS Location: arm  -KS Primary Wound Type: Skin tear  -KS    Retired Wound - Properties Group Placement Date: 11/22/23  -KS Placement Time: 0150  -KS Present on Original Admission: Y  -KS Side: Right  -KS Orientation: lower  -KS Location: arm  -KS Primary Wound Type: Skin tear  -KS    Retired Wound - Properties Group Date first assessed: 11/22/23  -KS Time first assessed: 0150  -KS Present on Original Admission: Y  -KS Side: Right  -KS Location: arm  -KS Primary Wound Type: Skin tear  -KS      Row Name             Wound 11/22/23 0150 Left lower arm Skin Tear    Wound - Properties Group Placement Date: 11/22/23  -KS Placement Time: 0150  -KS Present on Original Admission: Y  -KS Side: Left  -KS Orientation: lower  -KS Location: arm  -KS Primary Wound Type: Skin tear  -KS    Retired Wound - Properties Group Placement Date: 11/22/23  -KS Placement Time: 0150  -KS Present on Original Admission: Y  -KS Side: Left  -KS Orientation: lower  -KS Location: arm  -KS Primary Wound Type: Skin tear  -KS    Retired Wound - Properties Group Date first assessed: 11/22/23  -KS Time first assessed: 0150  -KS Present on Original Admission: Y  -KS Side: Left  -KS Location: arm  -KS Primary  Wound Type: Skin tear  -KS      Row Name             Wound 11/22/23 0150 Left gluteal Other (comment)    Wound - Properties Group Placement Date: 11/22/23  -KS Placement Time: 0150  -KS Present on Original Admission: Y  -KS Side: Left  -KS Location: gluteal  -KS Primary Wound Type: Other  -KS, scab     Retired Wound - Properties Group Placement Date: 11/22/23  -KS Placement Time: 0150  -KS Present on Original Admission: Y  -KS Side: Left  -KS Location: gluteal  -KS Primary Wound Type: Other  -KS, scab     Retired Wound - Properties Group Date first assessed: 11/22/23  -KS Time first assessed: 0150  -KS Present on Original Admission: Y  -KS Side: Left  -KS Location: gluteal  -KS Primary Wound Type: Other  -KS, scab       Row Name             Wound 11/22/23 0150 Bilateral gluteal MASD (Moisture associated skin damage)    Wound - Properties Group Placement Date: 11/22/23  -KS Placement Time: 0150  -KS Present on Original Admission: Y  -KS Side: Bilateral  -KS Location: gluteal  -KS Primary Wound Type: MASD  -KS    Retired Wound - Properties Group Placement Date: 11/22/23  -KS Placement Time: 0150  -KS Present on Original Admission: Y  -KS Side: Bilateral  -KS Location: gluteal  -KS Primary Wound Type: MASD  -KS    Retired Wound - Properties Group Date first assessed: 11/22/23  -KS Time first assessed: 0150  -KS Present on Original Admission: Y  -KS Side: Bilateral  -KS Location: gluteal  -KS Primary Wound Type: MASD  -KS      Row Name             Wound 11/23/23 1036 Left calf    Wound - Properties Group Placement Date: 11/23/23  -MR Placement Time: 1036  -MR Side: Left  -MR Location: calf  -MR    Retired Wound - Properties Group Placement Date: 11/23/23  -MR Placement Time: 1036  -MR Side: Left  -MR Location: calf  -MR    Retired Wound - Properties Group Date first assessed: 11/23/23  -MR Time first assessed: 1036  -MR Side: Left  -MR Location: calf  -MR      Row Name 11/29/23 1100          Plan of Care Review    Plan  of Care Reviewed With patient (P)   -RR     Outcome Evaluation Pt presents with minimal impairments in endurance/activity tolerance affecting stamina with ambulation. He is operating at his baseline and does not require skilled PT services at this time. He was educated on safe ambulation in his room and the hallway as tolerated to maintain strength and function. (P)   -RR       Row Name 11/29/23 1100          Positioning and Restraints    Pre-Treatment Position in bed (P)   -RR     Post Treatment Position bed (P)   -RR     In Bed call light within reach;encouraged to call for assist (P)   Exit alarm not in place upon entry  -RR       Row Name 11/29/23 1100          Therapy Assessment/Plan (PT)    Criteria for Skilled Interventions Met (PT) no problems identified which require skilled intervention (P)   -RR     Therapy Frequency (PT) evaluation only (P)   -RR       Row Name 11/29/23 1100          PT Evaluation Complexity    History, PT Evaluation Complexity 1-2 personal factors and/or comorbidities (P)   -RR     Examination of Body Systems (PT Eval Complexity) total of 4 or more elements (P)   -RR     Clinical Presentation (PT Evaluation Complexity) stable (P)   -RR     Clinical Decision Making (PT Evaluation Complexity) low complexity (P)   -RR     Overall Complexity (PT Evaluation Complexity) low complexity (P)   -RR       Row Name 11/29/23 1100          Therapy Plan Review/Discharge Plan (PT)    Therapy Plan Review (PT) evaluation/treatment results reviewed;patient (P)   -RR       Row Name 11/29/23 1100          Physical Therapy Goals    Problem Specific Goal Selection (PT) problem specific goal 1, PT (P)   -RR       Row Name 11/29/23 1100          Problem Specific Goal 1 (PT)    Problem Specific Goal 1 (PT) Complete PT evaluation (P)   -RR     Time Frame (Problem Specific Goal 1, PT) 1 day (P)   -RR     Progress/Outcome (Problem Specific Goal 1, PT) goal met (P)   -RR               User Key  (r) = Recorded By,  (t) = Taken By, (c) = Cosigned By      Initials Name Provider Type    Monika Mondragon, RN Registered Nurse     LeTona RN Registered Nurse    Daly Banks, PT Student PT Student                      PT Recommendation and Plan  Anticipated Discharge Disposition (PT): (P) home with assist  Therapy Frequency (PT): (P) evaluation only  Plan of Care Reviewed With: (P) patient  Outcome Evaluation: (P) Pt presents with minimal impairments in endurance/activity tolerance affecting stamina with ambulation. He is operating at his baseline and does not require skilled PT services at this time. He was educated on safe ambulation in his room and the hallway as tolerated to maintain strength and function.   Outcome Measures       Row Name 11/29/23 1100             How much help from another person do you currently need...    Turning from your back to your side while in flat bed without using bedrails? 4 (P)   -RR      Moving from lying on back to sitting on the side of a flat bed without bedrails? 4 (P)   -RR      Moving to and from a bed to a chair (including a wheelchair)? 4 (P)   -RR      Standing up from a chair using your arms (e.g., wheelchair, bedside chair)? 4 (P)   -RR      Climbing 3-5 steps with a railing? 4 (P)   -RR      To walk in hospital room? 4 (P)   -RR      AM-PAC 6 Clicks Score (PT) 24 (P)   -RR      Highest Level of Mobility Goal 8 --> Walked 250 feet or more (P)   -RR                User Key  (r) = Recorded By, (t) = Taken By, (c) = Cosigned By      Initials Name Provider Type    Daly Banks, PT Student PT Student                     Time Calculation:    PT Charges       Row Name 11/29/23 1143             Time Calculation    PT Received On 11/29/23 (P)   -RR         Untimed Charges    PT Eval/Re-eval Minutes 35 (P)   -RR         Total Minutes    Untimed Charges Total Minutes 35 (P)   -RR       Total Minutes 35 (P)   -RR                User Key  (r) = Recorded By, (t) = Taken By,  (c) = Cosigned By      Initials Name Provider Type    Daly Banks, PT Student PT Student                      PT G-Codes  AM-PAC 6 Clicks Score (PT): (P) 24    Daly Altman, PT Student  11/29/2023

## 2023-11-30 LAB
ALBUMIN SERPL-MCNC: 3.4 G/DL (ref 3.5–5.2)
ALP SERPL-CCNC: 81 U/L (ref 39–117)
ALT SERPL W P-5'-P-CCNC: 16 U/L (ref 1–41)
ANION GAP SERPL CALCULATED.3IONS-SCNC: 12.4 MMOL/L (ref 5–15)
AST SERPL-CCNC: 23 U/L (ref 1–40)
BACTERIA SPEC AEROBE CULT: NORMAL
BACTERIA SPEC AEROBE CULT: NORMAL
BASOPHILS # BLD AUTO: 0.07 10*3/MM3 (ref 0–0.2)
BASOPHILS NFR BLD AUTO: 0.7 % (ref 0–1.5)
BILIRUB CONJ SERPL-MCNC: <0.2 MG/DL (ref 0–0.3)
BILIRUB INDIRECT SERPL-MCNC: ABNORMAL MG/DL
BILIRUB SERPL-MCNC: 0.5 MG/DL (ref 0–1.2)
BUN SERPL-MCNC: 17 MG/DL (ref 8–23)
BUN/CREAT SERPL: 15.9 (ref 7–25)
CALCIUM SPEC-SCNC: 8.4 MG/DL (ref 8.6–10.5)
CHLORIDE SERPL-SCNC: 99 MMOL/L (ref 98–107)
CO2 SERPL-SCNC: 26.6 MMOL/L (ref 22–29)
CREAT SERPL-MCNC: 1.07 MG/DL (ref 0.76–1.27)
DEPRECATED RDW RBC AUTO: 43.8 FL (ref 37–54)
EGFRCR SERPLBLD CKD-EPI 2021: 76.1 ML/MIN/1.73
EOSINOPHIL # BLD AUTO: 0.3 10*3/MM3 (ref 0–0.4)
EOSINOPHIL NFR BLD AUTO: 3.1 % (ref 0.3–6.2)
ERYTHROCYTE [DISTWIDTH] IN BLOOD BY AUTOMATED COUNT: 13.3 % (ref 12.3–15.4)
GLUCOSE BLDC GLUCOMTR-MCNC: 138 MG/DL (ref 70–99)
GLUCOSE BLDC GLUCOMTR-MCNC: 141 MG/DL (ref 70–99)
GLUCOSE BLDC GLUCOMTR-MCNC: 156 MG/DL (ref 70–99)
GLUCOSE BLDC GLUCOMTR-MCNC: 216 MG/DL (ref 70–99)
GLUCOSE SERPL-MCNC: 135 MG/DL (ref 65–99)
GRAM STN SPEC: NORMAL
HCT VFR BLD AUTO: 35.1 % (ref 37.5–51)
HGB BLD-MCNC: 11.1 G/DL (ref 13–17.7)
IMM GRANULOCYTES # BLD AUTO: 0.51 10*3/MM3 (ref 0–0.05)
IMM GRANULOCYTES NFR BLD AUTO: 5.2 % (ref 0–0.5)
LYMPHOCYTES # BLD AUTO: 3.83 10*3/MM3 (ref 0.7–3.1)
LYMPHOCYTES NFR BLD AUTO: 39.2 % (ref 19.6–45.3)
MAGNESIUM SERPL-MCNC: 1.8 MG/DL (ref 1.6–2.4)
MCH RBC QN AUTO: 28.7 PG (ref 26.6–33)
MCHC RBC AUTO-ENTMCNC: 31.6 G/DL (ref 31.5–35.7)
MCV RBC AUTO: 90.7 FL (ref 79–97)
MONOCYTES # BLD AUTO: 0.65 10*3/MM3 (ref 0.1–0.9)
MONOCYTES NFR BLD AUTO: 6.6 % (ref 5–12)
NEUTROPHILS NFR BLD AUTO: 4.42 10*3/MM3 (ref 1.7–7)
NEUTROPHILS NFR BLD AUTO: 45.2 % (ref 42.7–76)
NRBC BLD AUTO-RTO: 0 /100 WBC (ref 0–0.2)
PHOSPHATE SERPL-MCNC: 3.3 MG/DL (ref 2.5–4.5)
PLATELET # BLD AUTO: 355 10*3/MM3 (ref 140–450)
PMV BLD AUTO: 9.4 FL (ref 6–12)
POTASSIUM SERPL-SCNC: 3.9 MMOL/L (ref 3.5–5.2)
PROT SERPL-MCNC: 8.4 G/DL (ref 6–8.5)
RBC # BLD AUTO: 3.87 10*6/MM3 (ref 4.14–5.8)
SODIUM SERPL-SCNC: 138 MMOL/L (ref 136–145)
WBC NRBC COR # BLD AUTO: 9.78 10*3/MM3 (ref 3.4–10.8)

## 2023-11-30 PROCEDURE — 25010000002 MEROPENEM PER 100 MG: Performed by: FAMILY MEDICINE

## 2023-11-30 PROCEDURE — 83735 ASSAY OF MAGNESIUM: CPT | Performed by: FAMILY MEDICINE

## 2023-11-30 PROCEDURE — 84100 ASSAY OF PHOSPHORUS: CPT | Performed by: FAMILY MEDICINE

## 2023-11-30 PROCEDURE — 80076 HEPATIC FUNCTION PANEL: CPT | Performed by: FAMILY MEDICINE

## 2023-11-30 PROCEDURE — 82948 REAGENT STRIP/BLOOD GLUCOSE: CPT

## 2023-11-30 PROCEDURE — 80048 BASIC METABOLIC PNL TOTAL CA: CPT | Performed by: FAMILY MEDICINE

## 2023-11-30 PROCEDURE — 63710000001 INSULIN LISPRO (HUMAN) PER 5 UNITS: Performed by: PHYSICIAN ASSISTANT

## 2023-11-30 PROCEDURE — 99232 SBSQ HOSP IP/OBS MODERATE 35: CPT | Performed by: FAMILY MEDICINE

## 2023-11-30 PROCEDURE — 85025 COMPLETE CBC W/AUTO DIFF WBC: CPT | Performed by: FAMILY MEDICINE

## 2023-11-30 PROCEDURE — 25010000002 HEPARIN (PORCINE) PER 1000 UNITS: Performed by: STUDENT IN AN ORGANIZED HEALTH CARE EDUCATION/TRAINING PROGRAM

## 2023-11-30 RX ADMIN — LEVOTHYROXINE SODIUM 175 MCG: 175 TABLET ORAL at 05:16

## 2023-11-30 RX ADMIN — Medication 10 ML: at 09:14

## 2023-11-30 RX ADMIN — MICONAZOLE NITRATE 1 APPLICATION: 2 POWDER TOPICAL at 09:16

## 2023-11-30 RX ADMIN — HEPARIN SODIUM 5000 UNITS: 5000 INJECTION INTRAVENOUS; SUBCUTANEOUS at 22:19

## 2023-11-30 RX ADMIN — MEROPENEM 1000 MG: 1 INJECTION, POWDER, FOR SOLUTION INTRAVENOUS at 05:16

## 2023-11-30 RX ADMIN — PAROXETINE HYDROCHLORIDE 60 MG: 20 TABLET, FILM COATED ORAL at 09:14

## 2023-11-30 RX ADMIN — INSULIN LISPRO 2 UNITS: 100 INJECTION, SOLUTION INTRAVENOUS; SUBCUTANEOUS at 17:07

## 2023-11-30 RX ADMIN — HEPARIN SODIUM 5000 UNITS: 5000 INJECTION INTRAVENOUS; SUBCUTANEOUS at 13:22

## 2023-11-30 RX ADMIN — FUROSEMIDE 20 MG: 20 TABLET ORAL at 09:14

## 2023-11-30 RX ADMIN — Medication 10 ML: at 22:19

## 2023-11-30 RX ADMIN — HEPARIN SODIUM 5000 UNITS: 5000 INJECTION INTRAVENOUS; SUBCUTANEOUS at 05:16

## 2023-11-30 RX ADMIN — INSULIN LISPRO 4 UNITS: 100 INJECTION, SOLUTION INTRAVENOUS; SUBCUTANEOUS at 13:22

## 2023-11-30 RX ADMIN — MEROPENEM 1000 MG: 1 INJECTION, POWDER, FOR SOLUTION INTRAVENOUS at 13:22

## 2023-11-30 RX ADMIN — ATORVASTATIN CALCIUM 80 MG: 40 TABLET, FILM COATED ORAL at 22:19

## 2023-11-30 RX ADMIN — MICONAZOLE NITRATE 1 APPLICATION: 2 POWDER TOPICAL at 22:40

## 2023-11-30 RX ADMIN — HYDROXYZINE HYDROCHLORIDE 30 MG: 10 TABLET ORAL at 09:14

## 2023-11-30 RX ADMIN — MEROPENEM 1000 MG: 1 INJECTION, POWDER, FOR SOLUTION INTRAVENOUS at 22:20

## 2023-11-30 RX ADMIN — Medication 10 ML: at 22:20

## 2023-11-30 RX ADMIN — PANTOPRAZOLE SODIUM 40 MG: 40 TABLET, DELAYED RELEASE ORAL at 05:16

## 2023-11-30 NOTE — SIGNIFICANT NOTE
Wound Eval / Progress Noted    ROSI Flynn     Patient Name: Raymond M Jr Damico  : 1955  MRN: [id number]  Today's Date: 2023                 Admit Date: 2023    Visit Dx:    ICD-10-CM ICD-9-CM   1. Sepsis, due to unspecified organism, unspecified whether acute organ dysfunction present  A41.9 038.9     995.91   2. Cellulitis of left lower extremity  L03.116 682.6   3. Difficulty walking  R26.2 719.7         Cellulitis of left lower extremity        Past Medical History:   Diagnosis Date    Callus     COPD (chronic obstructive pulmonary disease)     Diabetes mellitus     Gout     Hypertension         Past Surgical History:   Procedure Laterality Date    CARPAL TUNNEL RELEASE      EYE SURGERY      JOINT REPLACEMENT           Physical Assessment:  Wound 23 0150 Left lower leg Other (comment) (Active)   Wound Image    23 1120   Dressing Appearance intact;dry 23 1147   Closure None 23 1120   Base red;dry;scab 23 1120   Periwound blanchable;redness;swelling 23 1120   Periwound Temperature warm 23 1120   Periwound Skin Turgor soft 23 1120   Edges rolled/closed 23 1120   Drainage Characteristics/Odor serosanguineous 23 2326   Drainage Amount none 23 1120   Care, Wound cleansed with;sterile normal saline 23 1120   Dressing Care other (see comments) 23 1147   Periwound Care absorptive dressing applied 23 1120   Wound 23 1036 Left calf (Active)   Dressing Appearance intact;dry 23 1147   Closure None 23 1120   Base dry;red;scab 23 1120   Periwound intact;dry;edematous;redness 23 1120   Periwound Temperature warm 23 1120   Periwound Skin Turgor soft 23 1120   Edges rolled/closed 23 1120   Drainage Characteristics/Odor serosanguineous 23 2326   Drainage Amount none 23 1120   Care, Wound cleansed with;sterile normal saline 23 1126   Dressing Care other (see  comments) 11/30/23 1147   Periwound Care absorptive dressing applied 11/30/23 1120        Wound Check / Follow-up:  Patient seen today for a wound check and dressing change. Patient remains with significant redness and edema to the left lower leg, warm taut shiny tissue present. Ulcerations present to the anterior and posterior aspect with dry crusting to the wound. No visible moist wound base seen. Cleansed with NS. Recommend NS moistened silver impregnated hydrofiber to the ulcerations and light to moderate compression to the lower leg to assist with edema management. Will speak with MD about implementing compression.    Impression: redness, edema, ulcerations to the lower leg    Short term goals:  regain skin integrity, skin protection, moisture management, daily dressing changes, topical treatment    Dilma Killian RN    11/30/2023    14:59 EST

## 2023-11-30 NOTE — PROGRESS NOTES
[facility]   Hospitalist Progress Note  Date: 2023  Patient Name: Raymond M Jr Damico  : 1955  MRN: [id number]  Date of admission: 2023  Room/Bed: [facility]/      Subjective   Subjective     Chief Complaint: leg cellulitis    Summary:Raymond M Jr Damico is a 68 y.o. male with hypertension, hypothyroidism, diabetes presented with left leg erythema swelling and fever.  10 days prior he had scraped his left shin superficially with a piece of wood.  The day prior to admission he began having erythema and was brought to the ED and was diagnosed with cellulitis and placed on IV abx.    Interval follow-up:   2023    Up in chair.  Leg currently not elevated.  No fevers.  Leg dressing intact.  Erythema and pain have been receding past 48-72 hours.  Discussed with .  Still pending approval regarding outpatient IV therapy.  RUE PICC line in place.  Tolerating meropenem (Abx until 23)  Patient is ambulating short distances luis / His goal is to make it to daughter's wedding on   VSS  White count normalized  Glucose range 130-210    DME needs: Bariatric wheelchair. The patient's mobility limitation impairs ability to participate in all ADL's. Mobility limitation cannot be resolved by a cane or walker. Patient can safely and independently maneuver the wheelchair and the home provides adequate access between rooms. Use of wheelchair will improve patient's ability to participate in MRADL's and patient will be using inside the home. A caregiver is available if patient does not have the upper body strength and mental capacity to self-propel a wheelchair inside the home.       All systems reviewed and negative except: Generalized weakness fatigue leg pain      Objective   Objective     Vitals:   Temp:  [98.1 °F (36.7 °C)-99 °F (37.2 °C)] 98.4 °F (36.9 °C)  Heart Rate:  [] 74  Resp:  [16-18] 16  BP: (126-154)/(62-79) 141/64    Physical Exam   General: Awake, alert, NAD  HENT:  NCAT, MMM  Eyes: pupils equal, no scleral icterus  Cardiovascular: RRR, no murmurs   Pulmonary: CTA bilaterally; no wheezes; no conversational dyspnea  Gastrointestinal: S/ND/NT, +BS  Skin: Cellulitic left lower extremity seems somewhat improved today  Result Review    Result Review:  I have personally reviewed these results on 11/30/2023    [x]  Laboratory      Lab 11/30/23  0526 11/29/23  0522 11/28/23  0407 11/27/23  0424 11/26/23  0338 11/25/23  0418 11/24/23  0625 11/24/23  0625   WBC 9.78 8.99 9.90 11.19* 10.34 10.92*  --  10.83*   HEMOGLOBIN 11.1* 10.6* 10.9* 11.2* 10.9* 10.8*  --  10.9*   HEMATOCRIT 35.1* 33.2* 34.1* 35.5* 34.3* 33.7*  --  33.7*   PLATELETS 355 308 292 278 259 238  --  223   NEUTROS ABS 4.42 4.50 4.84 5.77 6.62 6.33   < > 6.34   IMMATURE GRANS (ABS) 0.51*  --  0.81* 0.98*  --   --   --  0.42*   LYMPHS ABS 3.83*  --  2.97 3.01  --   --   --  2.61   MONOS ABS 0.65  --  0.85 0.93*  --   --   --  1.08*   EOS ABS 0.30 0.27 0.39 0.40 0.41* 0.22   < > 0.31   MCV 90.7 90.2 89.7 90.3 89.8 89.2  --  89.4   SED RATE  --   --  79*  --  86*  --   --   --    CRP  --   --  4.14*  --  8.60*  --   --   --    PROCALCITONIN  --   --   --   --   --  0.61*  --  0.83*    < > = values in this interval not displayed.         Lab 11/30/23  0526 11/29/23  0522 11/28/23  0407   SODIUM 138 136 135*   POTASSIUM 3.9 3.7 3.7   CHLORIDE 99 101 99   CO2 26.6 27.9 25.1   ANION GAP 12.4 7.1 10.9   BUN 17 18 18   CREATININE 1.07 1.12 1.19   EGFR 76.1 72.0 67.0   GLUCOSE 135* 176* 171*   CALCIUM 8.4* 8.6 8.4*   MAGNESIUM 1.8 1.9 1.9   PHOSPHORUS 3.3 3.3 2.9         Lab 11/30/23  0526 11/29/23  0522 11/28/23  0407 11/27/23  0424 11/26/23  0338 11/25/23  0418 11/24/23  0625 11/24/23  0625   TOTAL PROTEIN 8.4 8.1 8.0   < > 7.9 8.0  --  7.5   ALBUMIN 3.4* 3.4* 3.3*   < > 3.1* 3.0*  --  2.8*   GLOBULIN  --   --   --   --   --   --   --  4.7   ALT (SGPT) 16 16 18   < > 18 17  --  14   AST (SGOT) 23 22 23   < > 23 24  --  17    BILIRUBIN 0.5 0.4 0.4   < > 0.6 0.5  --  0.5   INDIRECT BILIRUBIN  --   --   --   --  0.4 0.3  --   --    BILIRUBIN DIRECT <0.2 <0.2 <0.2   < > 0.2 0.2   < >  --    ALK PHOS 81 81 85   < > 88 90  --  81    < > = values in this interval not displayed.                     Brief Urine Lab Results       None          [x]  Microbiology   Microbiology Results (last 10 days)       Procedure Component Value - Date/Time    Wound Culture - Wound, Leg, Left [[id number]] Collected: 11/27/23 0936    Lab Status: Final result Specimen: Wound from Leg, Left Updated: 11/30/23 0856     Wound Culture No growth at 3 days     Gram Stain Rare (1+) WBCs seen      No organisms seen    Wound Culture - Wound, Leg, Left [[id number]] Collected: 11/27/23 0115    Lab Status: Final result Specimen: Wound from Leg, Left Updated: 11/30/23 0857     Wound Culture No growth at 3 days     Gram Stain Occasional WBCs seen      No organisms seen    MRSA Screen, PCR (Inpatient) - Swab, Nares [[id number]]  (Normal) Collected: 11/25/23 1244    Lab Status: Final result Specimen: Swab from Nares Updated: 11/25/23 1358     MRSA PCR No MRSA Detected    Narrative:      The negative predictive value of this diagnostic test is high and should only be used to consider de-escalating anti-MRSA therapy. A positive result may indicate colonization with MRSA and must be correlated clinically.    Wound Culture - Wound, Leg, Left [[id number]] Collected: 11/23/23 1029    Lab Status: Final result Specimen: Wound from Leg, Left Updated: 11/26/23 1107     Wound Culture No growth at 3 days     Gram Stain No WBCs or organisms seen    Blood Culture - Blood, Arm, Right [[id number]]  (Normal) Collected: 11/21/23 2055    Lab Status: Final result Specimen: Blood from Arm, Right Updated: 11/26/23 2215     Blood Culture No growth at 5 days    Blood Culture - Blood, Arm, Right [[id number]]  (Normal) Collected: 11/21/23 2020    Lab Status: Final result Specimen: Blood from Arm, Right  Updated: 11/26/23 2031     Blood Culture No growth at 5 days          [x]  Radiology  CT Lower Extremity Left Without Contrast    Result Date: 11/25/2023   Worsening soft tissue edema or cellulitis in the left lower leg.  No evidence of acute osseous abnormality, loculated soft tissue fluid collection or abnormal soft tissue gas collection.     JEFRY HARRISON MD       Electronically Signed and Approved By: JEFRY HARRISON MD on 11/25/2023 at 15:27            []  EKG/Telemetry   []  Cardiology/Vascular   []  Pathology  []  Old records  []  Other:      Assessment & Plan   Assessment / Plan     Assessment:  Sepsis, present on admission-fever, elevated white count  Cellulitis of the left lower extremity slow to resolve  Mildly elevated creatinine  Elevated lactic acid   Superficial skin cuts from the rabbit nails of the right hand  Intertrigo of the abdominal folds and groin  Hypertension  Hypothyroidism  Type 2 diabetes mellitus  Gout  Hyperlipidemia  Morbid obesity with BMI >43      Plan:  PICC line placed.   setting up outpatient antibiotics with IV Invanz  Scheduled for ertapenem daily until 12/12/23  Renal function improved Lasix has been on hold we will resume home dose of Lasix 20 mg daily  Monitor markers of inflammation  Keep left leg elevated with 2-3 pillows  CT lower extremity repeated on 11/25/23 showing worsening soft tissue edema / cellulitis in the left lower leg.  No evidence of acute osseous abnormality, loculated soft tissue fluid collection or abnormal soft tissue gas collection.  Continue topical wound care  Check a.m. labs  Further treatment contingent upon his hospital course  Likely home 24 to 48 hours once outpatient IV antibiotics have been arranged  Discussed with RN    DVT prophylaxis:  Medical DVT prophylaxis orders are present.    CODE STATUS:   Level Of Support Discussed With: Patient  Code Status (Patient has no pulse and is not breathing): CPR (Attempt to  Resuscitate)  Medical Interventions (Patient has pulse or is breathing): Full Support         Attending documentation:  I reviewed the above documentation and independently reviewed and rounded and evaluated the patient and discussed the care plan with DIAMANTE Alcantara PA-C, I agree with his findings and plan as documented, what I have added to the care plan and modified is as follows in my documentation and my medical decision making; 68-year-old male with diabetes, hospitalized on 11/21/2023 with sepsis secondary to left lower extremity cellulitis, extensive greater than 30 cm in length involving large portion of his lower left extremity.  Open wound left anterior lower leg after trauma with nonsterile piece of lumber.  Started on broad-spectrum antibiotics.  Progression of blistering gross lower extremity.  Worsening erythema, pursuing CT scan of the left lower extremity 11/25/2023 shows worsening cellulitis on Zosyn/vancomycin regimen, initial wound was dirty, likely soiled contaminated, escalate antibiotics to meropenem.  Significant positive response with meropenem.  Plan to transition to Invanz to cover resistant bugs which are typically found in the soil as initial wound was contaminated which resulted in cellulitis.  Patient does need to follow-up with infectious disease as outpatient as our facility does not have an in-house infectious disease specialist.  PICC line placed during this hospitalization.  Appealing to the [facility] system for approval of Invanz infusions as outpatient as there has been a positive response to carbapenems during this hospitalization.  Interval follow-up: Seen and examined, no acute distress, no acute major night events, cellulitis continues to improve with meropenem.  Awaiting approval from [facility] for Invanz to pursue disposition.  Significant delay with achieving approval for Invanz as outpatient with ID referral, unclear reasons as to why this is taking so long and  prolonging his hospitalization.  Review of systems obtained, all systems reviewed and negative except for generalized fatigue, generalized weakness, left leg pain, swelling, erythema further improved. On physical exam well-appearing male, no acute distress, laying in bed, regular rate and rhythm, clear to auscultation bilaterally, soft, nontender abdomen, left lower extremity with improved erythema and no new blistering, no new warmth.  Assessment as above, plan, continue meropenem.  Appealing to the [facility] system to approve Invanz so patient can be discharged and be seen by infectious disease which we do not have in our hospital.  The patient already has a PICC line. PICC line care.  Stressed the importance of keeping leg elevated.  Continue trending CRP and sed rate, continue insulin sliding scale coverage,  A.m. labs, full code, DVT prophylaxis with heparin, clinical course to dictate further management, discussed with nurse at the bedside.  More than 70 % of the time of this patient's encounter was performed by me, this included face-to-face time, planning and coordinating, medical decision making and critical thinking personally done by me.    Electronically signed by Wyatt Leiva MD, 11/30/23, 3:57 PM EST.    Portions of this documentation were transcribed electronically from a voice recognition software.  I confirm all data accurately represents the service(s) I performed at today's visit.

## 2023-11-30 NOTE — PLAN OF CARE
Goal Outcome Evaluation:  Plan of Care Reviewed With: patient        Progress: improving  Outcome Evaluation: Patient remains alert and oriented x4. No complaints of pain throughout the shift. Blood glucose monitored. Wound care performed by wound care nurse. Skin care performed as ordered. No new issues at this time.

## 2023-11-30 NOTE — PLAN OF CARE
"Goal Outcome Evaluation:           Progress: improving  Outcome Evaluation: patient is alert and oriented x4 and on room air. blood glucose monitored and treated per orders. prn pain medication given once for pain in the left lower extremity. wound care performed as ordered. pt refused skin care and stated \"we did it earlier today and it doesnt bother me so I think it is okay for now.\" labs drawn. iv antibiotics given per mar. possible d/c today. no other issues at this time.         "

## 2023-12-01 ENCOUNTER — READMISSION MANAGEMENT (OUTPATIENT)
Dept: CALL CENTER | Facility: HOSPITAL | Age: 68
End: 2023-12-01
Payer: OTHER GOVERNMENT

## 2023-12-01 VITALS
DIASTOLIC BLOOD PRESSURE: 81 MMHG | WEIGHT: 295.2 LBS | BODY MASS INDEX: 43.72 KG/M2 | SYSTOLIC BLOOD PRESSURE: 147 MMHG | HEIGHT: 69 IN | OXYGEN SATURATION: 98 % | TEMPERATURE: 98.1 F | RESPIRATION RATE: 20 BRPM | HEART RATE: 72 BPM

## 2023-12-01 LAB
ALBUMIN SERPL-MCNC: 3.1 G/DL (ref 3.5–5.2)
ALP SERPL-CCNC: 75 U/L (ref 39–117)
ALT SERPL W P-5'-P-CCNC: 16 U/L (ref 1–41)
ANION GAP SERPL CALCULATED.3IONS-SCNC: 9.3 MMOL/L (ref 5–15)
AST SERPL-CCNC: 22 U/L (ref 1–40)
BASOPHILS # BLD AUTO: 0.06 10*3/MM3 (ref 0–0.2)
BASOPHILS NFR BLD AUTO: 0.6 % (ref 0–1.5)
BILIRUB CONJ SERPL-MCNC: <0.2 MG/DL (ref 0–0.3)
BILIRUB INDIRECT SERPL-MCNC: ABNORMAL MG/DL
BILIRUB SERPL-MCNC: 0.4 MG/DL (ref 0–1.2)
BUN SERPL-MCNC: 17 MG/DL (ref 8–23)
BUN/CREAT SERPL: 17.5 (ref 7–25)
CALCIUM SPEC-SCNC: 8.1 MG/DL (ref 8.6–10.5)
CHLORIDE SERPL-SCNC: 100 MMOL/L (ref 98–107)
CO2 SERPL-SCNC: 28.7 MMOL/L (ref 22–29)
CREAT SERPL-MCNC: 0.97 MG/DL (ref 0.76–1.27)
DEPRECATED RDW RBC AUTO: 43.3 FL (ref 37–54)
EGFRCR SERPLBLD CKD-EPI 2021: 85.6 ML/MIN/1.73
EOSINOPHIL # BLD AUTO: 0.24 10*3/MM3 (ref 0–0.4)
EOSINOPHIL NFR BLD AUTO: 2.6 % (ref 0.3–6.2)
ERYTHROCYTE [DISTWIDTH] IN BLOOD BY AUTOMATED COUNT: 13.3 % (ref 12.3–15.4)
GLUCOSE BLDC GLUCOMTR-MCNC: 136 MG/DL (ref 70–99)
GLUCOSE BLDC GLUCOMTR-MCNC: 143 MG/DL (ref 70–99)
GLUCOSE SERPL-MCNC: 159 MG/DL (ref 65–99)
HCT VFR BLD AUTO: 32.6 % (ref 37.5–51)
HGB BLD-MCNC: 10.6 G/DL (ref 13–17.7)
IMM GRANULOCYTES # BLD AUTO: 0.33 10*3/MM3 (ref 0–0.05)
IMM GRANULOCYTES NFR BLD AUTO: 3.5 % (ref 0–0.5)
LYMPHOCYTES # BLD AUTO: 3.16 10*3/MM3 (ref 0.7–3.1)
LYMPHOCYTES NFR BLD AUTO: 33.9 % (ref 19.6–45.3)
MAGNESIUM SERPL-MCNC: 1.9 MG/DL (ref 1.6–2.4)
MCH RBC QN AUTO: 29.2 PG (ref 26.6–33)
MCHC RBC AUTO-ENTMCNC: 32.5 G/DL (ref 31.5–35.7)
MCV RBC AUTO: 89.8 FL (ref 79–97)
MONOCYTES # BLD AUTO: 0.62 10*3/MM3 (ref 0.1–0.9)
MONOCYTES NFR BLD AUTO: 6.6 % (ref 5–12)
NEUTROPHILS NFR BLD AUTO: 4.92 10*3/MM3 (ref 1.7–7)
NEUTROPHILS NFR BLD AUTO: 52.8 % (ref 42.7–76)
NRBC BLD AUTO-RTO: 0 /100 WBC (ref 0–0.2)
PHOSPHATE SERPL-MCNC: 2.9 MG/DL (ref 2.5–4.5)
PLATELET # BLD AUTO: 328 10*3/MM3 (ref 140–450)
PMV BLD AUTO: 9.2 FL (ref 6–12)
POTASSIUM SERPL-SCNC: 3.6 MMOL/L (ref 3.5–5.2)
PROT SERPL-MCNC: 8 G/DL (ref 6–8.5)
RBC # BLD AUTO: 3.63 10*6/MM3 (ref 4.14–5.8)
SODIUM SERPL-SCNC: 138 MMOL/L (ref 136–145)
WBC NRBC COR # BLD AUTO: 9.33 10*3/MM3 (ref 3.4–10.8)

## 2023-12-01 PROCEDURE — 84100 ASSAY OF PHOSPHORUS: CPT | Performed by: FAMILY MEDICINE

## 2023-12-01 PROCEDURE — 80048 BASIC METABOLIC PNL TOTAL CA: CPT | Performed by: FAMILY MEDICINE

## 2023-12-01 PROCEDURE — 25010000002 HEPARIN (PORCINE) PER 1000 UNITS: Performed by: STUDENT IN AN ORGANIZED HEALTH CARE EDUCATION/TRAINING PROGRAM

## 2023-12-01 PROCEDURE — 83735 ASSAY OF MAGNESIUM: CPT | Performed by: FAMILY MEDICINE

## 2023-12-01 PROCEDURE — 85025 COMPLETE CBC W/AUTO DIFF WBC: CPT | Performed by: FAMILY MEDICINE

## 2023-12-01 PROCEDURE — 82948 REAGENT STRIP/BLOOD GLUCOSE: CPT

## 2023-12-01 PROCEDURE — 25010000002 ERTAPENEM PER 500 MG: Performed by: FAMILY MEDICINE

## 2023-12-01 PROCEDURE — 80076 HEPATIC FUNCTION PANEL: CPT | Performed by: FAMILY MEDICINE

## 2023-12-01 PROCEDURE — 99239 HOSP IP/OBS DSCHRG MGMT >30: CPT | Performed by: FAMILY MEDICINE

## 2023-12-01 PROCEDURE — 25010000002 MEROPENEM PER 100 MG: Performed by: FAMILY MEDICINE

## 2023-12-01 RX ORDER — HYDROCODONE BITARTRATE AND ACETAMINOPHEN 5; 325 MG/1; MG/1
1 TABLET ORAL EVERY 4 HOURS PRN
Qty: 18 TABLET | Refills: 0 | Status: SHIPPED | OUTPATIENT
Start: 2023-12-01 | End: 2023-12-01 | Stop reason: SDUPTHER

## 2023-12-01 RX ORDER — HYDROCODONE BITARTRATE AND ACETAMINOPHEN 5; 325 MG/1; MG/1
1 TABLET ORAL EVERY 4 HOURS PRN
Qty: 18 TABLET | Refills: 0 | Status: SHIPPED | OUTPATIENT
Start: 2023-12-01 | End: 2023-12-04

## 2023-12-01 RX ORDER — NALOXONE HYDROCHLORIDE 4 MG/.1ML
SPRAY NASAL
Qty: 2 EACH | Refills: 0 | Status: SHIPPED | OUTPATIENT
Start: 2023-12-01

## 2023-12-01 RX ADMIN — HYDROXYZINE HYDROCHLORIDE 30 MG: 10 TABLET ORAL at 09:04

## 2023-12-01 RX ADMIN — LEVOTHYROXINE SODIUM 175 MCG: 175 TABLET ORAL at 06:03

## 2023-12-01 RX ADMIN — ERTAPENEM 1000 MG: 1 INJECTION INTRAMUSCULAR; INTRAVENOUS at 11:59

## 2023-12-01 RX ADMIN — Medication 10 ML: at 09:06

## 2023-12-01 RX ADMIN — HYDROCODONE BITARTRATE AND ACETAMINOPHEN 1 TABLET: 5; 325 TABLET ORAL at 14:14

## 2023-12-01 RX ADMIN — MEROPENEM 1000 MG: 1 INJECTION, POWDER, FOR SOLUTION INTRAVENOUS at 06:03

## 2023-12-01 RX ADMIN — HEPARIN SODIUM 5000 UNITS: 5000 INJECTION INTRAVENOUS; SUBCUTANEOUS at 06:04

## 2023-12-01 RX ADMIN — Medication 10 ML: at 09:05

## 2023-12-01 RX ADMIN — PAROXETINE HYDROCHLORIDE 60 MG: 20 TABLET, FILM COATED ORAL at 09:05

## 2023-12-01 RX ADMIN — MICONAZOLE NITRATE 1 APPLICATION: 2 POWDER TOPICAL at 09:08

## 2023-12-01 RX ADMIN — PANTOPRAZOLE SODIUM 40 MG: 40 TABLET, DELAYED RELEASE ORAL at 06:03

## 2023-12-01 RX ADMIN — FUROSEMIDE 20 MG: 20 TABLET ORAL at 09:05

## 2023-12-01 RX ADMIN — SODIUM CHLORIDE 40 ML: 9 INJECTION, SOLUTION INTRAVENOUS at 06:03

## 2023-12-01 NOTE — PLAN OF CARE
Goal Outcome Evaluation:  Plan of Care Reviewed With: patient        Progress: improving  Outcome Evaluation: Alert and oriented x4. Up to chair this shift. IV antibiotic administered per MAR. Wound care/skin care performed per order, pt educated on wound care/skin care. Educated on PICC line use at home and importance of infection prevention.

## 2023-12-01 NOTE — OUTREACH NOTE
Prep Survey      Flowsheet Row Responses   [facility] facility patient discharged from? [facility]   Is LACE score < 7 ? No   Eligibility Readm Mgmt   Discharge diagnosis Cellulitis of left lower extremity   Does the patient have one of the following disease processes/diagnoses(primary or secondary)? Sepsis   Does the patient have Home health ordered? No   Is there a DME ordered? Yes   What DME was ordered? B[facility]ric shower chair[facility]   Comments regarding appointments IV abx--ID   Medication alerts for this patient see AVS, IV ABX   Prep survey completed? Yes            Sabi FOSTER - Registered Nurse

## 2023-12-01 NOTE — DISCHARGE SUMMARY
[facility]         HOSPITALIST  DISCHARGE SUMMARY    Patient Name: Raymond M Jr Damico  : 1955  MRN: [id number]    Date of Admission: 2023  Date of Discharge:  2023    Primary Care Physician: Nic Anderson,     Consults       Date and Time Order Name Status Description    2023 10:28 PM Hospitalist (on-call MD unless specified)              Active and Resolved Hospital Problems:  Active Hospital Problems    Diagnosis POA    **Cellulitis of left lower extremity [L03.116] Yes      Resolved Hospital Problems   No resolved problems to display.       Hospital Course     Hospital Course:  68-year-old male with diabetes, hospitalized on 2023 with sepsis secondary to left lower extremity cellulitis, extensive greater than 30 cm in length involving large portion of his lower left extremity.  Open wound left anterior lower leg after trauma with nonsterile piece of lumber.  Started on broad-spectrum antibiotics.  Progression of blistering gross lower extremity.  Worsening erythema, pursuing CT scan of the left lower extremity 2023 shows worsening cellulitis on Zosyn/vancomycin regimen, initial wound was dirty, likely soiled contaminated, escalate antibiotics to meropenem.  Significant positive response with meropenem.  Plan to transition to Invanz to cover resistant bugs which are typically found in the soil as initial wound was contaminated which resulted in cellulitis.  Patient does need to follow-up with infectious disease as outpatient as our facility does not have an in-house infectious disease specialist.  PICC line placed during this hospitalization.  Appealing to the [facility] system for approval of Invanz infusions as outpatient as there has been a positive response to carbapenems during this hospitalization.  The patient was hesitant and reluctant to trial medications.  Invanz approved by [facility].  Discharged in hemodynamically stable addition on  12/1/2023 to follow-up with PCP at the [facility] system as well as referral for infectious disease.    Day of Discharge     Vital Signs:  Temp:  [97.7 °F (36.5 °C)-99.3 °F (37.4 °C)] 98.1 °F (36.7 °C)  Heart Rate:  [65-74] 72  Resp:  [20] 20  BP: (128-169)/(74-90) 147/81  Review of systems:  All systems reviewed and negative except: Generalized weakness fatigue leg pain    Physical Exam             General: Awake, alert, NAD  HENT: NCAT, MMM  Eyes: pupils equal, no scleral icterus  Cardiovascular: RRR, no murmurs   Pulmonary: CTA bilaterally; no wheezes; no conversational dyspnea  Gastrointestinal: S/ND/NT, +BS  Skin: Cellulitic left lower extremity      Discharge Details        Discharge Medications        New Medications        Instructions Start Date   ertapenem  Commonly known as: INVanz   1 g, Intravenous, Every 24 Hours      HYDROcodone-acetaminophen 5-325 MG per tablet  Commonly known as: NORCO   1 tablet, Oral, Every 4 Hours PRN      naloxone 4 MG/0.1ML nasal spray  Commonly known as: NARCAN   Call 911. Don't prime. [place] in 1 nostril for overdose. Repeat in 2-3 minutes in other nostril if no or minimal breathing/responsiveness.             Continue These Medications        Instructions Start Date   allopurinol 100 MG tablet  Commonly known as: ZYLOPRIM   Take 1 tablet by mouth Daily.      atorvastatin 80 MG tablet  Commonly known as: LIPITOR   80 mg, Oral, Daily      empagliflozin 25 MG tablet tablet  Commonly known as: JARDIANCE   25 mg, Oral, Daily      furosemide 20 MG tablet  Commonly known as: LASIX   Take 1 tablet by mouth Daily.      hydrOXYzine 10 MG tablet  Commonly known as: ATARAX   30 mg, Oral, Daily      levothyroxine 175 MCG tablet  Commonly known as: SYNTHROID, LEVOTHROID   Take 1 tablet by mouth Daily.      losartan 50 MG tablet  Commonly known as: COZAAR   50 mg, Oral, Daily      metFORMIN 1000 MG tablet  Commonly known as: GLUCOPHAGE   1,000 mg, Oral, 2 Times Daily With Meals     "  omeprazole 20 MG capsule  Commonly known as: priLOSEC   20 mg, Oral, Daily      Ozempic (0.25 or 0.5 MG/DOSE) 2 MG/1.5ML solution pen-injector  Generic drug: Semaglutide(0.25 or 0.5MG/DOS)   0.5 mg, Subcutaneous, Weekly, Takes once a week.      PARoxetine 30 MG tablet  Commonly known as: PAXIL   60 mg, Oral, Every Morning      traZODone 100 MG tablet  Commonly known as: DESYREL   200 mg, Oral, Nightly PRN      VITAMIN B 12 PO   500 mcg, Oral, Daily               Allergies   Allergen Reactions    Morphine Shortness Of Breath     \"stops breathing\"       [place] [facility] [place]:  Hospital:  Diet Order   Procedures    Diet: Cardiac Diets, Diabetic Diets; Healthy Heart (2-3 Na+); Consistent Carbohydrate; Texture: Regular Texture (IDDSI 7); Fluid Consistency: Thin (IDDSI 0)       Discharge Activity:       CODE STATUS:  Code Status and Medical Interventions:   Ordered at: 11/22/23 0104     Level Of Support Discussed With:    Patient     Code Status (Patient has no pulse and is not breathing):    CPR (Attempt to Resuscitate)     Medical Interventions (Patient has pulse or is breathing):    Full Support         Future Appointments   Date Time Provider Department Center   1/12/2024 10:15 AM Lg Robles DPM [facility]       Additional Instructions for the Follow-ups that You Need to Schedule       Discharge Follow-up with PCP   As directed       Currently Documented PCP:    Nic Anderson DO    PCP Phone Number:    [phone]     Follow Up Details: 3 to 7 days                Pertinent  and/or Most Recent Results     PROCEDURES:   CT Lower Extremity Left Without Contrast    Result Date: 11/25/2023  PROCEDURE: CT LOWER EXTREMITY LEFT WO CONTRAST  COMPARISON: [facility], CT, CT LOWER EXTREMITY LEFT W CONTRAST, 11/21/2023, 21:40.  INDICATIONS: Soft tissue infection suspected,LEFT lower leg, xray done  TECHNIQUE: After obtaining the patient's consent, multi-planar CT " images of the extremity were created without non-ionic intravenous contrast.   PROTOCOL:   Standard imaging protocol performed    RADIATION:   DLP: 1263.8 mGy*cm   MA and/or KV was adjusted to minimize radiation dose.     FINDINGS:  BONES: Degenerative changes are present in the visualized left knee and left ankle.  No fractures or acute osseous abnormalities are identified. SOFT TISSUES: Increasing skin thickening and soft tissue edema.  There appear to be superficial skin blisters in the left lower leg.  No evidence of loculated soft tissue fluid collection.  No evidence of abnormal soft tissue gas. OTHER: Negative.       Worsening soft tissue edema or cellulitis in the left lower leg.  No evidence of acute osseous abnormality, loculated soft tissue fluid collection or abnormal soft tissue gas collection.     JEFRY HARRISON MD       Electronically Signed and Approved By: JEFRY HARRISON MD on 11/25/2023 at 15:27             CT Lower Extremity Left With Contrast    Result Date: 11/21/2023  PROCEDURE: CT LOWER EXTREMITY LEFT W CONTRAST  COMPARISON: None  INDICATIONS: LOWER LEG PAIN AND REDNESS X 1 DAY, POST INJURY X 10 DAYS. Infection involving the mid to lower tibia  PROTOCOL:   Standard imaging protocol performed    RADIATION:   DLP: 293.5 mGy*cm   Automated exposure control was utilized to minimize radiation dose. CONTRAST: 92 cc Isovue 370 I.V. LABS:   eGFR: 59.7 ml/min/1.73m2  TECHNIQUE: After obtaining the patient's consent, multi-planar CT images of the extremity were created with non-ionic intravenous contrast.   FINDINGS:  There is a 2 mm calcification or radiopaque foreign body anterior to the mid to distal tibia on axial image 156. No acute fracture is seen.  There is edema in the subcutaneous fat and skin thickening of the lower leg.  There is ill-defined fluid in the subcutaneous fat of the lateral left lower leg.  No rim enhancing fluid collection is seen.  Tricompartmental arthritic changes of the  knee and suprapatellar joint effusion are included.  There also degenerative changes of the partially included hindfoot.         1. 2 mm calcification or foreign body in the soft tissues anterior to the mid to distal tibia. 2. Lower leg subcutaneous edema and skin thickening, which may be due to cellulitis.  Ill-defined fluid in the subcutaneous fat of the lateral lower leg, but no rim enhancing fluid collection is seen. 3. Tricompartmental arthritic changes of the knee and partially included suprapatellar joint effusion.     ALHAJI MARTINEZ MD       Electronically Signed and Approved By: ALHAJI MARTINEZ MD on 11/21/2023 at 22:32                LAB RESULTS:      Lab 12/01/23  0604 11/30/23  0526 11/29/23 0522 11/28/23 0407 11/27/23  0424 11/26/23  0338 11/25/23  0418   WBC 9.33 9.78 8.99 9.90 11.19* 10.34 10.92*   HEMOGLOBIN 10.6* 11.1* 10.6* 10.9* 11.2* 10.9* 10.8*   HEMATOCRIT 32.6* 35.1* 33.2* 34.1* 35.5* 34.3* 33.7*   PLATELETS 328 355 308 292 278 259 238   NEUTROS ABS 4.92 4.42 4.50 4.84 5.77 6.62 6.33   IMMATURE GRANS (ABS) 0.33* 0.51*  --  0.81* 0.98*  --   --    LYMPHS ABS 3.16* 3.83*  --  2.97 3.01  --   --    MONOS ABS 0.62 0.65  --  0.85 0.93*  --   --    EOS ABS 0.24 0.30 0.27 0.39 0.40 0.41* 0.22   MCV 89.8 90.7 90.2 89.7 90.3 89.8 89.2   SED RATE  --   --   --  79*  --  86*  --    CRP  --   --   --  4.14*  --  8.60*  --    PROCALCITONIN  --   --   --   --   --   --  0.61*         Lab 12/01/23  0604 11/30/23 0526 11/29/23 0522 11/28/23 0407 11/27/23  0424   SODIUM 138 138 136 135* 136   POTASSIUM 3.6 3.9 3.7 3.7 4.2   CHLORIDE 100 99 101 99 99   CO2 28.7 26.6 27.9 25.1 28.4   ANION GAP 9.3 12.4 7.1 10.9 8.6   BUN 17 17 18 18 19   CREATININE 0.97 1.07 1.12 1.19 1.12   EGFR 85.6 76.1 72.0 67.0 72.0   GLUCOSE 159* 135* 176* 171* 150*   CALCIUM 8.1* 8.4* 8.6 8.4* 8.3*   MAGNESIUM 1.9 1.8 1.9 1.9 1.9   PHOSPHORUS 2.9 3.3 3.3 2.9 3.1         Lab 12/01/23  0604 11/30/23  0526 11/29/23  0522  11/28/23  0407 11/27/23  0424 11/26/23  0338 11/25/23  0418   TOTAL PROTEIN 8.0 8.4 8.1 8.0 8.0 7.9 8.0   ALBUMIN 3.1* 3.4* 3.4* 3.3* 3.3* 3.1* 3.0*   ALT (SGPT) 16 16 16 18 21 18 17   AST (SGOT) 22 23 22 23 27 23 24   BILIRUBIN 0.4 0.5 0.4 0.4 0.5 0.6 0.5   INDIRECT BILIRUBIN  --   --   --   --   --  0.4 0.3   BILIRUBIN DIRECT <0.2 <0.2 <0.2 <0.2 <0.2 0.2 0.2   ALK PHOS 75 81 81 85 91 88 90                     Brief Urine Lab Results       None          Microbiology Results (last 10 days)       Procedure Component Value - Date/Time    Wound Culture - Wound, Leg, Left [[id number]] Collected: 11/27/23 0936    Lab Status: Final result Specimen: Wound from Leg, Left Updated: 11/30/23 0856     Wound Culture No growth at 3 days     Gram Stain Rare (1+) WBCs seen      No organisms seen    Wound Culture - Wound, Leg, Left [[id number]] Collected: 11/27/23 0115    Lab Status: Final result Specimen: Wound from Leg, Left Updated: 11/30/23 0857     Wound Culture No growth at 3 days     Gram Stain Occasional WBCs seen      No organisms seen    MRSA Screen, PCR (Inpatient) - Swab, Nares [[id number]]  (Normal) Collected: 11/25/23 1244    Lab Status: Final result Specimen: Swab from Nares Updated: 11/25/23 1358     MRSA PCR No MRSA Detected    Narrative:      The negative predictive value of this diagnostic test is high and should only be used to consider de-escalating anti-MRSA therapy. A positive result may indicate colonization with MRSA and must be correlated clinically.    Wound Culture - Wound, Leg, Left [[id number]] Collected: 11/23/23 1029    Lab Status: Final result Specimen: Wound from Leg, Left Updated: 11/26/23 1107     Wound Culture No growth at 3 days     Gram Stain No WBCs or organisms seen    Blood Culture - Blood, Arm, Right [[id number]]  (Normal) Collected: 11/21/23 2055    Lab Status: Final result Specimen: Blood from Arm, Right Updated: 11/26/23 2215     Blood Culture No growth at 5 days    Blood Culture - Blood,  Arm, Right [[id number]]  (Normal) Collected: 11/21/23 2020    Lab Status: Final result Specimen: Blood from Arm, Right Updated: 11/26/23 2031     Blood Culture No growth at 5 days            CT Lower Extremity Left Without Contrast    Result Date: 11/25/2023  Impression:  Worsening soft tissue edema or cellulitis in the left lower leg.  No evidence of acute osseous abnormality, loculated soft tissue fluid collection or abnormal soft tissue gas collection.     JEFRY HARRISON MD       Electronically Signed and Approved By: JEFRY HARRISON MD on 11/25/2023 at 15:27             CT Lower Extremity Left With Contrast    Result Date: 11/21/2023  Impression:   1. 2 mm calcification or foreign body in the soft tissues anterior to the mid to distal tibia. 2. Lower leg subcutaneous edema and skin thickening, which may be due to cellulitis.  Ill-defined fluid in the subcutaneous fat of the lateral lower leg, but no rim enhancing fluid collection is seen. 3. Tricompartmental arthritic changes of the knee and partially included suprapatellar joint effusion.     ALHAJI MARTINEZ MD       Electronically Signed and Approved By: ALHAJI MARTINEZ MD on 11/21/2023 at 22:32                Labs Pending at Discharge: None        Time spent on Discharge including face to face service:  35 minutes    Electronically signed by Wyatt Leiva MD, 12/01/23, 4:09 PM EST.    Portions of this documentation were transcribed electronically from a voice recognition software.  I confirm all data accurately represents the service(s) I performed at today's visit.

## 2023-12-01 NOTE — CONSULTS
Discharge Planning Assessment  [facility]     Patient Name: Raymond M Jr Damico  MRN: [id number]  Today's Date: 12/1/2023    Admit Date: 11/21/2023   Discharge Plan       Row Name 12/01/23 1043       Plan    Patient/Family in Agreement with Plan yes    Final Discharge Disposition Code 06 - home with home health care    Final Note Pt's [facility] abx [facility]. [facility] arranging for medications to be delivered to pt's home. JOHNNY spoke with pt's wife to provide an update. Pt will receive dose of IV Invanz prior to discharge today. SW followed up with the [facility] for D[facility] requests and they report they are still working on it at this time. [facility] referral [facility] by [facility] and wife is agreeable. Pt will discharge home today. No additional needs noted at this time.             Expected Discharge Date and Time       Expected Discharge Date Expected Discharge Time    Dec 1, 2023         CAROL Hitchcock

## 2023-12-01 NOTE — PLAN OF CARE
Goal Outcome Evaluation:  Plan of Care Reviewed With: patient        Progress: no change  Outcome Evaluation: Pt denies pain/discomfort this shift. Pt hopes to be discharged home this weekend. Wound and skin care provided as ordered. Pt is currently resting with no apparent distress, call light in reach. No new issues or new needs noted at this time.

## 2023-12-04 ENCOUNTER — LAB REQUISITION (OUTPATIENT)
Dept: LAB | Facility: HOSPITAL | Age: 68
End: 2023-12-04

## 2023-12-04 DIAGNOSIS — E08.9 DIABETES MELLITUS DUE TO UNDERLYING CONDITION WITHOUT COMPLICATIONS: ICD-10-CM

## 2023-12-04 DIAGNOSIS — L03.116 CELLULITIS OF LEFT LOWER LIMB: ICD-10-CM

## 2023-12-04 LAB
ALBUMIN SERPL-MCNC: 3.7 G/DL (ref 3.5–5.2)
ALBUMIN/GLOB SERPL: 0.8 G/DL
ALP SERPL-CCNC: 89 U/L (ref 39–117)
ALT SERPL W P-5'-P-CCNC: 13 U/L (ref 1–41)
ANION GAP SERPL CALCULATED.3IONS-SCNC: 10.8 MMOL/L (ref 5–15)
AST SERPL-CCNC: 19 U/L (ref 1–40)
BASOPHILS # BLD AUTO: 0.05 10*3/MM3 (ref 0–0.2)
BASOPHILS NFR BLD AUTO: 0.6 % (ref 0–1.5)
BILIRUB SERPL-MCNC: 0.4 MG/DL (ref 0–1.2)
BUN SERPL-MCNC: 13 MG/DL (ref 8–23)
BUN/CREAT SERPL: 12.4 (ref 7–25)
CALCIUM SPEC-SCNC: 8.8 MG/DL (ref 8.6–10.5)
CHLORIDE SERPL-SCNC: 101 MMOL/L (ref 98–107)
CO2 SERPL-SCNC: 28.2 MMOL/L (ref 22–29)
CREAT SERPL-MCNC: 1.05 MG/DL (ref 0.76–1.27)
CRP SERPL-MCNC: 0.82 MG/DL (ref 0–0.5)
DEPRECATED RDW RBC AUTO: 45.5 FL (ref 37–54)
EGFRCR SERPLBLD CKD-EPI 2021: 77.8 ML/MIN/1.73
EOSINOPHIL # BLD AUTO: 0.21 10*3/MM3 (ref 0–0.4)
EOSINOPHIL NFR BLD AUTO: 2.5 % (ref 0.3–6.2)
ERYTHROCYTE [DISTWIDTH] IN BLOOD BY AUTOMATED COUNT: 13.7 % (ref 12.3–15.4)
ERYTHROCYTE [SEDIMENTATION RATE] IN BLOOD: 55 MM/HR (ref 0–20)
GLOBULIN UR ELPH-MCNC: 4.9 GM/DL
GLUCOSE SERPL-MCNC: 166 MG/DL (ref 65–99)
HCT VFR BLD AUTO: 35.9 % (ref 37.5–51)
HGB BLD-MCNC: 11.4 G/DL (ref 13–17.7)
IMM GRANULOCYTES # BLD AUTO: 0.06 10*3/MM3 (ref 0–0.05)
IMM GRANULOCYTES NFR BLD AUTO: 0.7 % (ref 0–0.5)
LYMPHOCYTES # BLD AUTO: 3.11 10*3/MM3 (ref 0.7–3.1)
LYMPHOCYTES NFR BLD AUTO: 36.7 % (ref 19.6–45.3)
MCH RBC QN AUTO: 29.2 PG (ref 26.6–33)
MCHC RBC AUTO-ENTMCNC: 31.8 G/DL (ref 31.5–35.7)
MCV RBC AUTO: 92.1 FL (ref 79–97)
MONOCYTES # BLD AUTO: 0.58 10*3/MM3 (ref 0.1–0.9)
MONOCYTES NFR BLD AUTO: 6.8 % (ref 5–12)
NEUTROPHILS NFR BLD AUTO: 4.46 10*3/MM3 (ref 1.7–7)
NEUTROPHILS NFR BLD AUTO: 52.7 % (ref 42.7–76)
NRBC BLD AUTO-RTO: 0 /100 WBC (ref 0–0.2)
PLATELET # BLD AUTO: 419 10*3/MM3 (ref 140–450)
PMV BLD AUTO: 9.5 FL (ref 6–12)
POTASSIUM SERPL-SCNC: 3.7 MMOL/L (ref 3.5–5.2)
PROT SERPL-MCNC: 8.6 G/DL (ref 6–8.5)
RBC # BLD AUTO: 3.9 10*6/MM3 (ref 4.14–5.8)
SODIUM SERPL-SCNC: 140 MMOL/L (ref 136–145)
WBC NRBC COR # BLD AUTO: 8.47 10*3/MM3 (ref 3.4–10.8)

## 2023-12-04 PROCEDURE — 85025 COMPLETE CBC W/AUTO DIFF WBC: CPT | Performed by: INTERNAL MEDICINE

## 2023-12-04 PROCEDURE — 86140 C-REACTIVE PROTEIN: CPT | Performed by: INTERNAL MEDICINE

## 2023-12-04 PROCEDURE — 85652 RBC SED RATE AUTOMATED: CPT | Performed by: INTERNAL MEDICINE

## 2023-12-04 PROCEDURE — 80053 COMPREHEN METABOLIC PANEL: CPT | Performed by: INTERNAL MEDICINE

## 2023-12-07 ENCOUNTER — READMISSION MANAGEMENT (OUTPATIENT)
Dept: CALL CENTER | Facility: HOSPITAL | Age: 68
End: 2023-12-07
Payer: OTHER GOVERNMENT

## 2023-12-07 NOTE — OUTREACH NOTE
Sepsis Week 1 Survey      Flowsheet Row Responses   [facility] facility patient discharged from? [facility]   Does the patient have one of the following disease processes/diagnoses(primary or secondary)? Sepsis   Week 1 attempt successful? No   Unsuccessful attempts Attempt 1  [attempted pt and wife]            XENA POZO - Registered Nurse

## 2023-12-11 ENCOUNTER — LAB REQUISITION (OUTPATIENT)
Dept: LAB | Facility: HOSPITAL | Age: 68
End: 2023-12-11

## 2023-12-11 DIAGNOSIS — L03.90 CELLULITIS, UNSPECIFIED: ICD-10-CM

## 2023-12-11 LAB
ALBUMIN SERPL-MCNC: 3.8 G/DL (ref 3.5–5.2)
ALBUMIN/GLOB SERPL: 0.8 G/DL
ALP SERPL-CCNC: 91 U/L (ref 39–117)
ALT SERPL W P-5'-P-CCNC: 11 U/L (ref 1–41)
ANION GAP SERPL CALCULATED.3IONS-SCNC: 10.2 MMOL/L (ref 5–15)
AST SERPL-CCNC: 16 U/L (ref 1–40)
BASOPHILS # BLD AUTO: 0.07 10*3/MM3 (ref 0–0.2)
BASOPHILS NFR BLD AUTO: 0.9 % (ref 0–1.5)
BILIRUB SERPL-MCNC: 0.6 MG/DL (ref 0–1.2)
BUN SERPL-MCNC: 16 MG/DL (ref 8–23)
BUN/CREAT SERPL: 15.1 (ref 7–25)
CALCIUM SPEC-SCNC: 9.2 MG/DL (ref 8.6–10.5)
CHLORIDE SERPL-SCNC: 101 MMOL/L (ref 98–107)
CO2 SERPL-SCNC: 26.8 MMOL/L (ref 22–29)
CREAT SERPL-MCNC: 1.06 MG/DL (ref 0.76–1.27)
CRP SERPL-MCNC: 0.46 MG/DL (ref 0–0.5)
DEPRECATED RDW RBC AUTO: 44 FL (ref 37–54)
EGFRCR SERPLBLD CKD-EPI 2021: 76.9 ML/MIN/1.73
EOSINOPHIL # BLD AUTO: 0.27 10*3/MM3 (ref 0–0.4)
EOSINOPHIL NFR BLD AUTO: 3.6 % (ref 0.3–6.2)
ERYTHROCYTE [DISTWIDTH] IN BLOOD BY AUTOMATED COUNT: 13.3 % (ref 12.3–15.4)
ERYTHROCYTE [SEDIMENTATION RATE] IN BLOOD: 68 MM/HR (ref 0–20)
GLOBULIN UR ELPH-MCNC: 4.7 GM/DL
GLUCOSE SERPL-MCNC: 139 MG/DL (ref 65–99)
HCT VFR BLD AUTO: 36.1 % (ref 37.5–51)
HGB BLD-MCNC: 11.6 G/DL (ref 13–17.7)
IMM GRANULOCYTES # BLD AUTO: 0.03 10*3/MM3 (ref 0–0.05)
IMM GRANULOCYTES NFR BLD AUTO: 0.4 % (ref 0–0.5)
LYMPHOCYTES # BLD AUTO: 2.92 10*3/MM3 (ref 0.7–3.1)
LYMPHOCYTES NFR BLD AUTO: 38.5 % (ref 19.6–45.3)
MCH RBC QN AUTO: 29.2 PG (ref 26.6–33)
MCHC RBC AUTO-ENTMCNC: 32.1 G/DL (ref 31.5–35.7)
MCV RBC AUTO: 90.9 FL (ref 79–97)
MONOCYTES # BLD AUTO: 0.68 10*3/MM3 (ref 0.1–0.9)
MONOCYTES NFR BLD AUTO: 9 % (ref 5–12)
NEUTROPHILS NFR BLD AUTO: 3.62 10*3/MM3 (ref 1.7–7)
NEUTROPHILS NFR BLD AUTO: 47.6 % (ref 42.7–76)
NRBC BLD AUTO-RTO: 0 /100 WBC (ref 0–0.2)
PLATELET # BLD AUTO: 295 10*3/MM3 (ref 140–450)
PMV BLD AUTO: 10.1 FL (ref 6–12)
POTASSIUM SERPL-SCNC: 4.2 MMOL/L (ref 3.5–5.2)
PROT SERPL-MCNC: 8.5 G/DL (ref 6–8.5)
RBC # BLD AUTO: 3.97 10*6/MM3 (ref 4.14–5.8)
SODIUM SERPL-SCNC: 138 MMOL/L (ref 136–145)
WBC NRBC COR # BLD AUTO: 7.59 10*3/MM3 (ref 3.4–10.8)

## 2023-12-11 PROCEDURE — 80053 COMPREHEN METABOLIC PANEL: CPT | Performed by: INTERNAL MEDICINE

## 2023-12-11 PROCEDURE — 85652 RBC SED RATE AUTOMATED: CPT | Performed by: INTERNAL MEDICINE

## 2023-12-11 PROCEDURE — 86140 C-REACTIVE PROTEIN: CPT | Performed by: INTERNAL MEDICINE

## 2023-12-11 PROCEDURE — 85025 COMPLETE CBC W/AUTO DIFF WBC: CPT | Performed by: INTERNAL MEDICINE

## 2023-12-18 ENCOUNTER — READMISSION MANAGEMENT (OUTPATIENT)
Dept: CALL CENTER | Facility: HOSPITAL | Age: 68
End: 2023-12-18
Payer: OTHER GOVERNMENT

## 2023-12-18 NOTE — OUTREACH NOTE
Sepsis Week 3 Survey      Flowsheet Row Responses   [facility] patient discharged from? [facility]   Does the patient have one of the following disease processes/diagnoses(primary or secondary)? Sepsis   Week 3 attempt successful? No   Unsuccessful attempts Attempt 1            Mary Jo HERNANDEZ - Licensed Nurse

## 2023-12-19 ENCOUNTER — APPOINTMENT (OUTPATIENT)
Dept: GENERAL RADIOLOGY | Facility: HOSPITAL | Age: 68
End: 2023-12-19
Payer: OTHER GOVERNMENT

## 2023-12-19 ENCOUNTER — HOSPITAL ENCOUNTER (EMERGENCY)
Facility: HOSPITAL | Age: 68
Discharge: HOME | End: 2023-12-19
Attending: EMERGENCY MEDICINE | Admitting: EMERGENCY MEDICINE
Payer: OTHER GOVERNMENT

## 2023-12-19 VITALS
WEIGHT: 296.3 LBS | OXYGEN SATURATION: 96 % | TEMPERATURE: 98.8 F | HEIGHT: 69 IN | SYSTOLIC BLOOD PRESSURE: 122 MMHG | BODY MASS INDEX: 43.89 KG/M2 | HEART RATE: 76 BPM | DIASTOLIC BLOOD PRESSURE: 60 MMHG | RESPIRATION RATE: 18 BRPM

## 2023-12-19 DIAGNOSIS — L03.116 CELLULITIS OF LEFT LOWER EXTREMITY: Primary | ICD-10-CM

## 2023-12-19 LAB
ALBUMIN SERPL-MCNC: 3.6 G/DL (ref 3.5–5.2)
ALBUMIN/GLOB SERPL: 0.7 G/DL
ALP SERPL-CCNC: 89 U/L (ref 39–117)
ALT SERPL W P-5'-P-CCNC: 11 U/L (ref 1–41)
ANION GAP SERPL CALCULATED.3IONS-SCNC: 12 MMOL/L (ref 5–15)
AST SERPL-CCNC: 15 U/L (ref 1–40)
BASOPHILS # BLD AUTO: 0.03 10*3/MM3 (ref 0–0.2)
BASOPHILS NFR BLD AUTO: 0.4 % (ref 0–1.5)
BILIRUB SERPL-MCNC: 0.4 MG/DL (ref 0–1.2)
BUN SERPL-MCNC: 24 MG/DL (ref 8–23)
BUN/CREAT SERPL: 16.1 (ref 7–25)
CALCIUM SPEC-SCNC: 8.2 MG/DL (ref 8.6–10.5)
CHLORIDE SERPL-SCNC: 103 MMOL/L (ref 98–107)
CO2 SERPL-SCNC: 22 MMOL/L (ref 22–29)
CREAT SERPL-MCNC: 1.49 MG/DL (ref 0.76–1.27)
D-LACTATE SERPL-SCNC: 1.6 MMOL/L (ref 0.5–2)
DEPRECATED RDW RBC AUTO: 45 FL (ref 37–54)
EGFRCR SERPLBLD CKD-EPI 2021: 51.1 ML/MIN/1.73
EOSINOPHIL # BLD AUTO: 0.34 10*3/MM3 (ref 0–0.4)
EOSINOPHIL NFR BLD AUTO: 4.3 % (ref 0.3–6.2)
ERYTHROCYTE [DISTWIDTH] IN BLOOD BY AUTOMATED COUNT: 13.9 % (ref 12.3–15.4)
GLOBULIN UR ELPH-MCNC: 5 GM/DL
GLUCOSE SERPL-MCNC: 153 MG/DL (ref 65–99)
HCT VFR BLD AUTO: 34.3 % (ref 37.5–51)
HGB BLD-MCNC: 11.3 G/DL (ref 13–17.7)
IMM GRANULOCYTES # BLD AUTO: 0.04 10*3/MM3 (ref 0–0.05)
IMM GRANULOCYTES NFR BLD AUTO: 0.5 % (ref 0–0.5)
LYMPHOCYTES # BLD AUTO: 2.52 10*3/MM3 (ref 0.7–3.1)
LYMPHOCYTES NFR BLD AUTO: 32.2 % (ref 19.6–45.3)
MCH RBC QN AUTO: 29.4 PG (ref 26.6–33)
MCHC RBC AUTO-ENTMCNC: 32.9 G/DL (ref 31.5–35.7)
MCV RBC AUTO: 89.1 FL (ref 79–97)
MONOCYTES # BLD AUTO: 0.52 10*3/MM3 (ref 0.1–0.9)
MONOCYTES NFR BLD AUTO: 6.6 % (ref 5–12)
NEUTROPHILS NFR BLD AUTO: 4.38 10*3/MM3 (ref 1.7–7)
NEUTROPHILS NFR BLD AUTO: 56 % (ref 42.7–76)
NRBC BLD AUTO-RTO: 0 /100 WBC (ref 0–0.2)
PLATELET # BLD AUTO: 197 10*3/MM3 (ref 140–450)
PMV BLD AUTO: 10.1 FL (ref 6–12)
POTASSIUM SERPL-SCNC: 3.6 MMOL/L (ref 3.5–5.2)
PROT SERPL-MCNC: 8.6 G/DL (ref 6–8.5)
RBC # BLD AUTO: 3.85 10*6/MM3 (ref 4.14–5.8)
SODIUM SERPL-SCNC: 137 MMOL/L (ref 136–145)
WBC NRBC COR # BLD AUTO: 7.83 10*3/MM3 (ref 3.4–10.8)

## 2023-12-19 PROCEDURE — 85025 COMPLETE CBC W/AUTO DIFF WBC: CPT

## 2023-12-19 PROCEDURE — 87040 BLOOD CULTURE FOR BACTERIA: CPT

## 2023-12-19 PROCEDURE — 99283 EMERGENCY DEPT VISIT LOW MDM: CPT

## 2023-12-19 PROCEDURE — 80053 COMPREHEN METABOLIC PANEL: CPT | Performed by: EMERGENCY MEDICINE

## 2023-12-19 PROCEDURE — 36415 COLL VENOUS BLD VENIPUNCTURE: CPT

## 2023-12-19 PROCEDURE — 83605 ASSAY OF LACTIC ACID: CPT

## 2023-12-19 PROCEDURE — 73590 X-RAY EXAM OF LOWER LEG: CPT

## 2023-12-19 NOTE — ED PROVIDER NOTES
"Time: 2:57 PM EST  Date of encounter:  12/19/2023  Independent Historian/Clinical History and Information was obtained by:   Patient  Chief Complaint: Wound infection    History is limited by: N/A    History of Present Illness:  Patient is a 68 y.o. year old male who presents to the emergency department for evaluation of wound infection on left leg.  States he was hospitalized 11/21/2023 with sepsis secondary to left lower extremity cellulitis a PICC line was placed and he was giving IV Zosyn and vancomycin and was discharged home after 9 days.  [facility] doxycycline p.o. for 4 additional days, finished last Sunday.    HPI    Patient Care Team  Primary Care Provider: Nic Anderson DO    Past Medical History:     Allergies   Allergen Reactions    Morphine Shortness Of Breath     \"stops breathing\"     Past Medical History:   Diagnosis Date    Callus     COPD (chronic obstructive pulmonary disease)     Diabetes mellitus     Gout     Hypertension      Past Surgical History:   Procedure Laterality Date    CARPAL TUNNEL RELEASE      EYE SURGERY      JOINT REPLACEMENT       Family History   Problem Relation Age of Onset    Cancer Father     Diabetes Father     Heart disease Neg Hx     Hypertension Neg Hx     Thyroid disease Neg Hx        Home Medications:  Prior to Admission medications    Medication Sig Start Date End Date Taking? Authorizing Provider   allopurinol (ZYLOPRIM) 100 MG tablet Take 1 tablet by mouth Daily.    Mona Barron MD   atorvastatin (LIPITOR) 80 MG tablet Take 1 tablet by mouth Daily.    ProviderMona MD   Cyanocobalamin (VITAMIN B 12 PO) Take 500 mcg by mouth Daily.    Mona Barron MD   empagliflozin (JARDIANCE) 25 MG tablet tablet Take 1 tablet by mouth Daily.    Mona Barron MD   furosemide (LASIX) 20 MG tablet Take 1 tablet by mouth Daily.    Mona Barron MD   hydrOXYzine (ATARAX) 10 MG tablet Take 3 tablets by mouth Daily.    " Mona Barron MD   levothyroxine (SYNTHROID, LEVOTHROID) 175 MCG tablet Take 1 tablet by mouth Daily.    Mona Barron MD   losartan (COZAAR) 50 MG tablet Take 1 tablet by mouth Daily.    Mona Barron MD   metFORMIN (GLUCOPHAGE) 1000 MG tablet Take 1 tablet by mouth 2 (Two) Times a Day With Meals.    Mona Barron MD   naloxone (NARCAN) 4 MG/0.1ML nasal spray Call 911. Don't prime. Sweet in 1 nostril for overdose. Repeat in 2-3 minutes in other nostril if no or minimal breathing/responsiveness. 23   Wyatt Leiva MD   omeprazole (priLOSEC) 20 MG capsule Take 1 capsule by mouth Daily.    Mona Barron MD   PARoxetine (PAXIL) 30 MG tablet Take 2 tablets by mouth Every Morning.    Mona Barron MD   Semaglutide,0.25 or 0.5MG/DOS, (Ozempic, 0.25 or 0.5 MG/DOSE,) 2 MG/1.5ML solution pen-injector Inject 0.5 mg under the skin into the appropriate area as directed 1 (One) Time Per Week. Takes once a week.    Mona Barron MD   traZODone (DESYREL) 100 MG tablet Take 2 tablets by mouth At Night As Needed for Sleep.    Mona Barron MD        Social History:   Social History     Tobacco Use    Smoking status: Former     Packs/day: 1.00     Years: 40.00     Additional pack years: 0.00     Total pack years: 40.00     Types: Cigarettes, Cigars     Quit date: 2012     Years since quittin.0    Tobacco comments:     Never should have started   Vaping Use    Vaping Use: Never used   Substance Use Topics    Alcohol use: Yes     Alcohol/week: 3.0 standard drinks of alcohol     Types: 3 Shots of liquor per week     Comment: social    Drug use: Yes     Frequency: 2.0 times per week     Types: Marijuana         Review of Systems:  Review of Systems   Constitutional:  Negative for chills and fever.   HENT:  Negative for congestion, ear pain and sore throat.    Eyes:  Negative for pain.   Respiratory:  Negative for cough, chest tightness and shortness of  "breath.    Cardiovascular:  Negative for chest pain.   Gastrointestinal:  Negative for abdominal pain, diarrhea, nausea and vomiting.   Genitourinary:  Negative for flank pain and hematuria.   Musculoskeletal:  Negative for joint swelling.   Skin:  Positive for color change and wound. Negative for pallor.   Neurological:  Negative for seizures and headaches.   All other systems reviewed and are negative.         Physical Exam:  /60 (BP Location: Left arm)   Pulse 76   Temp 98.8 °F (37.1 °C) (Oral)   Resp 18   Ht 175.3 cm (69\")   Wt 134 kg (296 lb 4.8 oz)   SpO2 96%   BMI 43.76 kg/m²     Physical Exam  Vitals and nursing note reviewed.   Constitutional:       General: He is not in acute distress.     Appearance: Normal appearance. He is not toxic-appearing.   HENT:      Head: Normocephalic and atraumatic.      Mouth/Throat:      Mouth: Mucous membranes are moist.   Eyes:      General: No scleral icterus.  Cardiovascular:      Rate and Rhythm: Normal rate and regular rhythm.      Pulses: Normal pulses.      Heart sounds: Normal heart sounds.   Pulmonary:      Effort: Pulmonary effort is normal. No respiratory distress.      Breath sounds: Normal breath sounds.   Abdominal:      General: Abdomen is flat.      Palpations: Abdomen is soft.      Tenderness: There is no abdominal tenderness.   Musculoskeletal:         General: Normal range of motion.      Cervical back: Normal range of motion and neck supple.   Skin:     General: Skin is warm and dry.      Findings: Erythema present.      Comments: Cellulitis of the left lower leg with   Neurological:      Mental Status: He is alert and oriented to person, place, and time. Mental status is at baseline.   Psychiatric:         Judgment: Judgment normal.               Vital signs were reviewed under triage note.            Procedures:  Procedures      Medical Decision Making:      Comorbidities that affect care:    Diabetes, COPD, hypertension    External Notes " reviewed:    Hospital Discharge Summary: 11/21/2023 discharge of left lower leg cellulitis and Previous ED Note: Diagnosed with left lower leg cellulitis      The following orders were placed and all results were independently analyzed by me:  Orders Placed This Encounter   Procedures    Blood Culture - Blood,    Blood Culture - Blood,    XR Tibia Fibula 2 View Left    Comprehensive Metabolic Panel    CBC Auto Differential    Lactic Acid, Plasma    CBC & Differential       Medications Given in the Emergency Department:  Medications - No data to display     ED Course:         Labs:    Lab Results (last 24 hours)       Procedure Component Value Units Date/Time    Comprehensive Metabolic Panel [[id number]]  (Abnormal) Collected: 12/19/23 1316    Specimen: Blood Updated: 12/19/23 1346     Glucose 153 mg/dL      BUN 24 mg/dL      Creatinine 1.49 mg/dL      Sodium 137 mmol/L      Potassium 3.6 mmol/L      Chloride 103 mmol/L      CO2 22.0 mmol/L      Calcium 8.2 mg/dL      Total Protein 8.6 g/dL      Albumin 3.6 g/dL      ALT (SGPT) 11 U/L      AST (SGOT) 15 U/L      Alkaline Phosphatase 89 U/L      Total Bilirubin 0.4 mg/dL      Globulin 5.0 gm/dL      A/G Ratio 0.7 g/dL      BUN/Creatinine Ratio 16.1     Anion Gap 12.0 mmol/L      eGFR 51.1 mL/min/1.73     Narrative:      GFR Normal >60  Chronic Kidney Disease <60  Kidney Failure <15      CBC & Differential [[id number]]  (Abnormal) Collected: 12/19/23 1316    Specimen: Blood Updated: 12/19/23 1328    Narrative:      The following orders were created for panel order CBC & Differential.  Procedure                               Abnormality         Status                     ---------                               -----------         ------                     CBC Auto Differential[[id number]]        Abnormal            Final result                 Please view results for these tests on the individual orders.    CBC Auto Differential [[id number]]  (Abnormal) Collected: 12/19/23  1316    Specimen: Blood Updated: 12/19/23 1328     WBC 7.83 10*3/mm3      RBC 3.85 10*6/mm3      Hemoglobin 11.3 g/dL      Hematocrit 34.3 %      MCV 89.1 fL      MCH 29.4 pg      MCHC 32.9 g/dL      RDW 13.9 %      RDW-SD 45.0 fl      MPV 10.1 fL      Platelets 197 10*3/mm3      Neutrophil % 56.0 %      Lymphocyte % 32.2 %      Monocyte % 6.6 %      Eosinophil % 4.3 %      Basophil % 0.4 %      Immature Grans % 0.5 %      Neutrophils, Absolute 4.38 10*3/mm3      Lymphocytes, Absolute 2.52 10*3/mm3      Monocytes, Absolute 0.52 10*3/mm3      Eosinophils, Absolute 0.34 10*3/mm3      Basophils, Absolute 0.03 10*3/mm3      Immature Grans, Absolute 0.04 10*3/mm3      nRBC 0.0 /100 WBC     Lactic Acid, Plasma [[id number]] Collected: 12/19/23 1455    Specimen: Blood Updated: 12/19/23 1507    Blood Culture - Blood, Arm, Left [[id number]] Collected: 12/19/23 1455    Specimen: Blood from Arm, Left Updated: 12/19/23 1509    Blood Culture - Blood, Arm, Right [[id number]] Collected: 12/19/23 1455    Specimen: Blood from Arm, Right Updated: 12/19/23 1509             Imaging:    XR Tibia Fibula 2 View Left    Result Date: 12/19/2023  PROCEDURE: XR TIBIA FIBULA 2 VW LEFT  COMPARISON: None  INDICATIONS: CELLULITIS LEFT ANTERIOR MID LOWER LEG  FINDINGS:  Generalized soft tissue swelling is noted with induration of the subcutaneous fat.  There is no subcutaneous emphysema identified.  Osseous structures are unremarkable        1. Generalized soft tissue swelling compatible with given history of cellulitis      FESTUS GUPTA MD       Electronically Signed and Approved By: FESTUS GUPTA MD on 12/19/2023 at 15:04                Differential Diagnosis and Discussion:    Extremity Pain: Differential diagnosis includes but is not limited to soft tissue sprain, tendonitis, tendon injury, dislocation, fracture, deep vein thrombosis, arterial insufficiency, osteoarthritis, bursitis, and ligamentous damage.    All labs were reviewed and  interpreted by me.  All X-rays impressions were independently interpreted by me.    MDM     Amount and/or Complexity of Data Reviewed  Clinical lab tests: reviewed    Risk of Complications, Morbidity, and/or Mortality  Presenting problems: moderate  Diagnostic procedures: moderate  Management options: moderate         Patient Care Considerations:    ANTIBIOTICS: I considered prescribing antibiotics as an outpatient however no bacterial focus of infection was found.      Consultants/Shared Management Plan:    None    Social Determinants of Health:    Patient is independent, reliable, and has access to care.       Disposition and Care Coordination:    Discharged: The patient is suitable and stable for discharge with no need for consideration of observation or admission.    [unfilled]  I have explained discharge medications and the need for follow up with the patient/caretakers. This was also printed in the discharge instructions. Patient was discharged with the following medications and follow up:      Medication List      No changes were made to your prescriptions during this visit.      Nic Anderson, DO  [address] [facility] [address]  [phone]    Schedule an appointment as soon as possible for a visit   For wound care       Final diagnoses:   Cellulitis of left lower extremity        ED Disposition       ED Disposition   Discharge    Condition   Stable    Comment   --               This medical record created using voice recognition software.             Jessica Calderon, APRN  12/19/23 3784

## 2023-12-19 NOTE — DISCHARGE INSTRUCTIONS
Read and follow educational instructions provided to you in discharge packet. If symptoms worsen or fail to improve as anticipated return to  ER.  Follow up with your PCP for wound care.  Patient agrees to treatment plan.

## 2023-12-19 NOTE — ED PROVIDER NOTES
"Patient is 68 y.o. year old male that presents to the ED for evaluation of leg wound.     Physical Exam    I have personally reviewed uploaded media images from physical exam today.  I compared these images to his uploaded media images from last month and he certainly has improvement.    ED Course:    /60 (BP Location: Left arm)   Pulse 76   Temp 98.8 °F (37.1 °C) (Oral)   Resp 18   Ht 175.3 cm (69\")   Wt 134 kg (296 lb 4.8 oz)   SpO2 96%   BMI 43.76 kg/m²   Results for orders placed or performed during the hospital encounter of 12/19/23   Comprehensive Metabolic Panel    Specimen: Blood   Result Value Ref Range    Glucose 153 (H) 65 - 99 mg/dL    BUN 24 (H) 8 - 23 mg/dL    Creatinine 1.49 (H) 0.76 - 1.27 mg/dL    Sodium 137 136 - 145 mmol/L    Potassium 3.6 3.5 - 5.2 mmol/L    Chloride 103 98 - 107 mmol/L    CO2 22.0 22.0 - 29.0 mmol/L    Calcium 8.2 (L) 8.6 - 10.5 mg/dL    Total Protein 8.6 (H) 6.0 - 8.5 g/dL    Albumin 3.6 3.5 - 5.2 g/dL    ALT (SGPT) 11 1 - 41 U/L    AST (SGOT) 15 1 - 40 U/L    Alkaline Phosphatase 89 39 - 117 U/L    Total Bilirubin 0.4 0.0 - 1.2 mg/dL    Globulin 5.0 gm/dL    A/G Ratio 0.7 g/dL    BUN/Creatinine Ratio 16.1 7.0 - 25.0    Anion Gap 12.0 5.0 - 15.0 mmol/L    eGFR 51.1 (L) >60.0 mL/min/1.73   CBC Auto Differential    Specimen: Blood   Result Value Ref Range    WBC 7.83 3.40 - 10.80 10*3/mm3    RBC 3.85 (L) 4.14 - 5.80 10*6/mm3    Hemoglobin 11.3 (L) 13.0 - 17.7 g/dL    Hematocrit 34.3 (L) 37.5 - 51.0 %    MCV 89.1 79.0 - 97.0 fL    MCH 29.4 26.6 - 33.0 pg    MCHC 32.9 31.5 - 35.7 g/dL    RDW 13.9 12.3 - 15.4 %    RDW-SD 45.0 37.0 - 54.0 fl    MPV 10.1 6.0 - 12.0 fL    Platelets 197 140 - 450 10*3/mm3    Neutrophil % 56.0 42.7 - 76.0 %    Lymphocyte % 32.2 19.6 - 45.3 %    Monocyte % 6.6 5.0 - 12.0 %    Eosinophil % 4.3 0.3 - 6.2 %    Basophil % 0.4 0.0 - 1.5 %    Immature Grans % 0.5 0.0 - 0.5 %    Neutrophils, Absolute 4.38 1.70 - 7.00 10*3/mm3    Lymphocytes, " Absolute 2.52 0.70 - 3.10 10*3/mm3    Monocytes, Absolute 0.52 0.10 - 0.90 10*3/mm3    Eosinophils, Absolute 0.34 0.00 - 0.40 10*3/mm3    Basophils, Absolute 0.03 0.00 - 0.20 10*3/mm3    Immature Grans, Absolute 0.04 0.00 - 0.05 10*3/mm3    nRBC 0.0 0.0 - 0.2 /100 WBC     Medications - No data to display  XR Tibia Fibula 2 View Left    Result Date: 12/19/2023  Narrative: PROCEDURE: XR TIBIA FIBULA 2 VW LEFT  COMPARISON: None  INDICATIONS: CELLULITIS LEFT ANTERIOR MID LOWER LEG  FINDINGS:  Generalized soft tissue swelling is noted with induration of the subcutaneous fat.  There is no subcutaneous emphysema identified.  Osseous structures are unremarkable      Impression:   1. Generalized soft tissue swelling compatible with given history of cellulitis      FESTUS GUPTA MD       Electronically Signed and Approved By: FESTUS GUPTA MD on 12/19/2023 at 15:04             CT Lower Extremity Left Without Contrast    Result Date: 11/25/2023  Narrative: PROCEDURE: CT LOWER EXTREMITY LEFT WO CONTRAST  COMPARISON: [facility], CT, CT LOWER EXTREMITY LEFT W CONTRAST, 11/21/2023, 21:40.  INDICATIONS: Soft tissue infection suspected,LEFT lower leg, xray done  TECHNIQUE: After obtaining the patient's consent, multi-planar CT images of the extremity were created without non-ionic intravenous contrast.   PROTOCOL:   Standard imaging protocol performed    RADIATION:   DLP: 1263.8 mGy*cm   MA and/or KV was adjusted to minimize radiation dose.     FINDINGS:  BONES: Degenerative changes are present in the visualized left knee and left ankle.  No fractures or acute osseous abnormalities are identified. SOFT TISSUES: Increasing skin thickening and soft tissue edema.  There appear to be superficial skin blisters in the left lower leg.  No evidence of loculated soft tissue fluid collection.  No evidence of abnormal soft tissue gas. OTHER: Negative.      Impression:  Worsening soft tissue edema or cellulitis in the left  lower leg.  No evidence of acute osseous abnormality, loculated soft tissue fluid collection or abnormal soft tissue gas collection.     JEFRY HARRISON MD       Electronically Signed and Approved By: JEFRY HARRISON MD on 11/25/2023 at 15:27             CT Lower Extremity Left With Contrast    Result Date: 11/21/2023  Narrative: PROCEDURE: CT LOWER EXTREMITY LEFT W CONTRAST  COMPARISON: None  INDICATIONS: LOWER LEG PAIN AND REDNESS X 1 DAY, POST INJURY X 10 DAYS. Infection involving the mid to lower tibia  PROTOCOL:   Standard imaging protocol performed    RADIATION:   DLP: 293.5 mGy*cm   Automated exposure control was utilized to minimize radiation dose. CONTRAST: 92 cc Isovue 370 I.V. LABS:   eGFR: 59.7 ml/min/1.73m2  TECHNIQUE: After obtaining the patient's consent, multi-planar CT images of the extremity were created with non-ionic intravenous contrast.   FINDINGS:  There is a 2 mm calcification or radiopaque foreign body anterior to the mid to distal tibia on axial image 156. No acute fracture is seen.  There is edema in the subcutaneous fat and skin thickening of the lower leg.  There is ill-defined fluid in the subcutaneous fat of the lateral left lower leg.  No rim enhancing fluid collection is seen.  Tricompartmental arthritic changes of the knee and suprapatellar joint effusion are included.  There also degenerative changes of the partially included hindfoot.       Impression:   1. 2 mm calcification or foreign body in the soft tissues anterior to the mid to distal tibia. 2. Lower leg subcutaneous edema and skin thickening, which may be due to cellulitis.  Ill-defined fluid in the subcutaneous fat of the lateral lower leg, but no rim enhancing fluid collection is seen. 3. Tricompartmental arthritic changes of the knee and partially included suprapatellar joint effusion.     ALHAJI MARTINEZ MD       Electronically Signed and Approved By: ALHAJI MARTINEZ MD on 11/21/2023 at 22:32               MDM:    Procedures          This visit was performed by both myself and an APC.  The substantive portion of the medical decision making was performed by this me and I made or approved the management plan and take responsibility for patient management.  All studies documented in the APC note (if performed) were independently interpreted by me.      Dale John MD  15:13 EST  12/19/23         Dale John MD  12/19/23 1513

## 2023-12-21 ENCOUNTER — READMISSION MANAGEMENT (OUTPATIENT)
Dept: CALL CENTER | Facility: HOSPITAL | Age: 68
End: 2023-12-21
Payer: OTHER GOVERNMENT

## 2023-12-21 NOTE — OUTREACH NOTE
Sepsis Week 3 Survey      Flowsheet Row Responses   [facility] facility patient discharged from? [facility]   Does the patient have one of the following disease processes/diagnoses(primary or secondary)? Sepsis   Week 3 attempt successful? No   Unsuccessful attempts Attempt 2            Nikki FERGUSON - Registered Nurse

## 2023-12-22 ENCOUNTER — HOSPITAL ENCOUNTER (EMERGENCY)
Facility: HOSPITAL | Age: 68
Discharge: HOME | End: 2023-12-22
Attending: EMERGENCY MEDICINE
Payer: OTHER GOVERNMENT

## 2023-12-22 ENCOUNTER — APPOINTMENT (OUTPATIENT)
Dept: CT IMAGING | Facility: HOSPITAL | Age: 68
End: 2023-12-22
Payer: OTHER GOVERNMENT

## 2023-12-22 VITALS
SYSTOLIC BLOOD PRESSURE: 128 MMHG | HEIGHT: 69 IN | TEMPERATURE: 98.5 F | OXYGEN SATURATION: 97 % | DIASTOLIC BLOOD PRESSURE: 82 MMHG | RESPIRATION RATE: 18 BRPM | WEIGHT: 297 LBS | BODY MASS INDEX: 43.99 KG/M2 | HEART RATE: 75 BPM

## 2023-12-22 DIAGNOSIS — L03.116 CELLULITIS OF LEFT LOWER EXTREMITY: Primary | ICD-10-CM

## 2023-12-22 LAB
ALBUMIN SERPL-MCNC: 3.7 G/DL (ref 3.5–5.2)
ALBUMIN/GLOB SERPL: 0.8 G/DL
ALP SERPL-CCNC: 85 U/L (ref 39–117)
ALT SERPL W P-5'-P-CCNC: 15 U/L (ref 1–41)
ANION GAP SERPL CALCULATED.3IONS-SCNC: 13.2 MMOL/L (ref 5–15)
AST SERPL-CCNC: 26 U/L (ref 1–40)
BASOPHILS # BLD AUTO: 0.06 10*3/MM3 (ref 0–0.2)
BASOPHILS NFR BLD AUTO: 0.6 % (ref 0–1.5)
BILIRUB SERPL-MCNC: 0.3 MG/DL (ref 0–1.2)
BUN SERPL-MCNC: 19 MG/DL (ref 8–23)
BUN/CREAT SERPL: 15.6 (ref 7–25)
CALCIUM SPEC-SCNC: 8.7 MG/DL (ref 8.6–10.5)
CHLORIDE SERPL-SCNC: 101 MMOL/L (ref 98–107)
CO2 SERPL-SCNC: 24.8 MMOL/L (ref 22–29)
CREAT SERPL-MCNC: 1.22 MG/DL (ref 0.76–1.27)
D-LACTATE SERPL-SCNC: 0.9 MMOL/L (ref 0.5–2)
DEPRECATED RDW RBC AUTO: 44.4 FL (ref 37–54)
EGFRCR SERPLBLD CKD-EPI 2021: 65 ML/MIN/1.73
EOSINOPHIL # BLD AUTO: 0.32 10*3/MM3 (ref 0–0.4)
EOSINOPHIL NFR BLD AUTO: 3.3 % (ref 0.3–6.2)
ERYTHROCYTE [DISTWIDTH] IN BLOOD BY AUTOMATED COUNT: 13.8 % (ref 12.3–15.4)
GLOBULIN UR ELPH-MCNC: 4.9 GM/DL
GLUCOSE SERPL-MCNC: 111 MG/DL (ref 65–99)
HCT VFR BLD AUTO: 34.1 % (ref 37.5–51)
HGB BLD-MCNC: 11.3 G/DL (ref 13–17.7)
IMM GRANULOCYTES # BLD AUTO: 0.06 10*3/MM3 (ref 0–0.05)
IMM GRANULOCYTES NFR BLD AUTO: 0.6 % (ref 0–0.5)
LYMPHOCYTES # BLD AUTO: 3.42 10*3/MM3 (ref 0.7–3.1)
LYMPHOCYTES NFR BLD AUTO: 35.7 % (ref 19.6–45.3)
MCH RBC QN AUTO: 29.4 PG (ref 26.6–33)
MCHC RBC AUTO-ENTMCNC: 33.1 G/DL (ref 31.5–35.7)
MCV RBC AUTO: 88.6 FL (ref 79–97)
MONOCYTES # BLD AUTO: 0.86 10*3/MM3 (ref 0.1–0.9)
MONOCYTES NFR BLD AUTO: 9 % (ref 5–12)
NEUTROPHILS NFR BLD AUTO: 4.85 10*3/MM3 (ref 1.7–7)
NEUTROPHILS NFR BLD AUTO: 50.8 % (ref 42.7–76)
NRBC BLD AUTO-RTO: 0 /100 WBC (ref 0–0.2)
PLATELET # BLD AUTO: 210 10*3/MM3 (ref 140–450)
PMV BLD AUTO: 9.9 FL (ref 6–12)
POTASSIUM SERPL-SCNC: 3.4 MMOL/L (ref 3.5–5.2)
PROT SERPL-MCNC: 8.6 G/DL (ref 6–8.5)
RBC # BLD AUTO: 3.85 10*6/MM3 (ref 4.14–5.8)
SODIUM SERPL-SCNC: 139 MMOL/L (ref 136–145)
WBC NRBC COR # BLD AUTO: 9.57 10*3/MM3 (ref 3.4–10.8)

## 2023-12-22 PROCEDURE — 99285 EMERGENCY DEPT VISIT HI MDM: CPT

## 2023-12-22 PROCEDURE — 80053 COMPREHEN METABOLIC PANEL: CPT | Performed by: EMERGENCY MEDICINE

## 2023-12-22 PROCEDURE — 73701 CT LOWER EXTREMITY W/DYE: CPT

## 2023-12-22 PROCEDURE — 87040 BLOOD CULTURE FOR BACTERIA: CPT | Performed by: EMERGENCY MEDICINE

## 2023-12-22 PROCEDURE — 25510000001 IOPAMIDOL PER 1 ML: Performed by: EMERGENCY MEDICINE

## 2023-12-22 PROCEDURE — 85025 COMPLETE CBC W/AUTO DIFF WBC: CPT | Performed by: EMERGENCY MEDICINE

## 2023-12-22 PROCEDURE — 83605 ASSAY OF LACTIC ACID: CPT | Performed by: EMERGENCY MEDICINE

## 2023-12-22 PROCEDURE — 36415 COLL VENOUS BLD VENIPUNCTURE: CPT

## 2023-12-22 RX ORDER — HYDROCODONE BITARTRATE AND ACETAMINOPHEN 7.5; 325 MG/1; MG/1
1 TABLET ORAL EVERY 6 HOURS PRN
Qty: 15 TABLET | Refills: 0 | Status: SHIPPED | OUTPATIENT
Start: 2023-12-22

## 2023-12-22 RX ORDER — SULFAMETHOXAZOLE AND TRIMETHOPRIM 800; 160 MG/1; MG/1
1 TABLET ORAL 2 TIMES DAILY
Qty: 20 TABLET | Refills: 0 | Status: SHIPPED | OUTPATIENT
Start: 2023-12-22

## 2023-12-22 RX ORDER — POTASSIUM CHLORIDE 750 MG/1
40 CAPSULE, EXTENDED RELEASE ORAL ONCE
Status: COMPLETED | OUTPATIENT
Start: 2023-12-22 | End: 2023-12-22

## 2023-12-22 RX ORDER — AMOXICILLIN AND CLAVULANATE POTASSIUM 875; 125 MG/1; MG/1
1 TABLET, FILM COATED ORAL EVERY 12 HOURS
Qty: 20 TABLET | Refills: 0 | Status: SHIPPED | OUTPATIENT
Start: 2023-12-22

## 2023-12-22 RX ADMIN — MUPIROCIN 1 APPLICATION: 20 OINTMENT TOPICAL at 16:17

## 2023-12-22 RX ADMIN — IOPAMIDOL 100 ML: 755 INJECTION, SOLUTION INTRAVENOUS at 13:37

## 2023-12-22 RX ADMIN — POTASSIUM CHLORIDE 40 MEQ: 10 CAPSULE, COATED, EXTENDED RELEASE ORAL at 15:14

## 2023-12-22 NOTE — DISCHARGE INSTRUCTIONS
Wash your leg with antibacterial soap such as Hibiclens.  You can pick this up at [facility].  This is over-the-counter.  Keep the leg wrapped and covered.  Apply the Bactroban ointment to the scabbed lesions until they are fully healed.

## 2023-12-22 NOTE — ED PROVIDER NOTES
"Time: 3:32 PM EST  Date of encounter:  12/22/2023  Independent Historian/Clinical History and Information was obtained by:   Patient and Family  Chief Complaint: Leg pain    History is limited by: N/A    History of Present Illness:  Patient is a 68 y.o. year old male who presents to the emergency department for evaluation of increasing pain and swelling over his left lower extremity.  Patient was seen by me back in November 21, 2023 and admitted to hospital that time with a leg wound and cellulitis.  Patient was placed on antibiotics until 12/12/2023 at which point these were discontinued by infectious disease through the [facility].  He was then discharged home with for 5 days of oral antibiotics and the stopped on 17 December.  Patient reports he had some increasing pain and swelling was seen in the ED on the 19th.  Patient is wife states that last night she noticed some \"weeping\" from the leg and he had increased pain last night.  Patient is concerned he is getting a recurrence of his infection and Carlos presents to the ER now for evaluation.  Patient denies any new injury.  Patient denies any fevers.    HPI    Patient Care Team  Primary Care Provider: Nic Anderson DO    Past Medical History:     Allergies   Allergen Reactions    Morphine Shortness Of Breath     \"stops breathing\"     Past Medical History:   Diagnosis Date    Callus     COPD (chronic obstructive pulmonary disease)     Diabetes mellitus     Gout     Hypertension      Past Surgical History:   Procedure Laterality Date    CARPAL TUNNEL RELEASE      EYE SURGERY      JOINT REPLACEMENT       Family History   Problem Relation Age of Onset    Cancer Father     Diabetes Father     Heart disease Neg Hx     Hypertension Neg Hx     Thyroid disease Neg Hx        Home Medications:  Prior to Admission medications    Medication Sig Start Date End Date Taking? Authorizing Provider   allopurinol (ZYLOPRIM) 100 MG tablet Take 1 tablet by mouth Daily.    Provider, " MD Mona   atorvastatin (LIPITOR) 80 MG tablet Take 1 tablet by mouth Daily.    Mona Barron MD   Cyanocobalamin (VITAMIN B 12 PO) Take 500 mcg by mouth Daily.    Mona Barron MD   empagliflozin (JARDIANCE) 25 MG tablet tablet Take 1 tablet by mouth Daily.    Mona Barron MD   furosemide (LASIX) 20 MG tablet Take 1 tablet by mouth Daily.    Mona Barron MD   hydrOXYzine (ATARAX) 10 MG tablet Take 3 tablets by mouth Daily.    Mona Barron MD   levothyroxine (SYNTHROID, LEVOTHROID) 175 MCG tablet Take 1 tablet by mouth Daily.    Mona Barron MD   losartan (COZAAR) 50 MG tablet Take 1 tablet by mouth Daily.    Mona Barron MD   metFORMIN (GLUCOPHAGE) 1000 MG tablet Take 1 tablet by mouth 2 (Two) Times a Day With Meals.    Mona Barron MD   naloxone (NARCAN) 4 MG/0.1ML nasal spray Call 911. Don't prime. [place] in 1 nostril for overdose. Repeat in 2-3 minutes in other nostril if no or minimal breathing/responsiveness. 23   Wyatt Leiva MD   omeprazole (priLOSEC) 20 MG capsule Take 1 capsule by mouth Daily.    Mona Barron MD   PARoxetine (PAXIL) 30 MG tablet Take 2 tablets by mouth Every Morning.    Mona Barron MD   Semaglutide,0.25 or 0.5MG/DOS, (Ozempic, 0.25 or 0.5 MG/DOSE,) 2 MG/1.5ML solution pen-injector Inject 0.5 mg under the skin into the appropriate area as directed 1 (One) Time Per Week. Takes once a week.    Mona Barron MD   traZODone (DESYREL) 100 MG tablet Take 2 tablets by mouth At Night As Needed for Sleep.    Mona Barron MD        Social History:   Social History     Tobacco Use    Smoking status: Former     Packs/day: 1.00     Years: 40.00     Additional pack years: 0.00     Total pack years: 40.00     Types: Cigarettes, Cigars     Quit date: 2012     Years since quittin.0    Tobacco comments:     Never should have started   Vaping Use    Vaping Use: Never used  "  Substance Use Topics    Alcohol use: Yes     Alcohol/week: 3.0 standard drinks of alcohol     Types: 3 Shots of liquor per week     Comment: social    Drug use: Yes     Frequency: 2.0 times per week     Types: Marijuana         Review of Systems:  Review of Systems   Constitutional:  Negative for chills and fever.   HENT:  Negative for congestion, ear pain and sore throat.    Eyes:  Negative for pain.   Respiratory:  Negative for cough, chest tightness and shortness of breath.    Cardiovascular:  Positive for leg swelling (Left lower extremity). Negative for chest pain.   Gastrointestinal:  Negative for abdominal pain, diarrhea, nausea and vomiting.   Genitourinary:  Negative for flank pain and hematuria.   Musculoskeletal:  Negative for joint swelling.   Skin:  Positive for color change. Negative for pallor.   Neurological:  Negative for seizures and headaches.   All other systems reviewed and are negative.       Physical Exam:  /82   Pulse 75   Temp 98.5 °F (36.9 °C) (Oral)   Resp 18   Ht 175.3 cm (69\")   Wt 135 kg (297 lb)   SpO2 97%   BMI 43.86 kg/m²     Physical Exam    Vital signs were reviewed under triage note.  General appearance - Patient appears well-developed and well-nourished.  Patient is in no acute distress.  Head - Normocephalic, atraumatic.  Pupils - Equal, round, reactive to light.  Extraocular muscles are intact.  Conjunctiva is clear.  Nasal - Normal inspection.  No evidence of trauma or epistaxis.  Tympanic membranes - Gray, intact without erythema or retractions.  Oral mucosa - Pink and moist without lesions or erythema.  Uvula is midline.  Chest wall - Atraumatic.  Chest wall is nontender.  There are no vesicular rashes noted.  Neck - Supple.  Trachea was midline.  There is no palpable lymphadenopathy or thyromegaly.  There are no meningeal signs  Lungs - Clear to auscultation and percussion bilaterally.  Heart - Regular rate and rhythm without any murmurs, clicks, or " gallops.  Abdomen - Soft.  Bowel sounds are present.  There is no palpable tenderness.  There is no rebound, guarding, or rigidity.  There are no palpable masses.  There are no pulsatile masses.  Back - Spine is straight and midline.  There is no CVA tenderness.  Extremities - Intact x4 with full range of motion.  Patient has [place] maroon/light purple coloration to the left lower extremity.  There is no warmth to the leg.  There is no bright red erythema noted.  Patient is a couple crusted lesions that appear to be healing well.  There is no palpable edema.  Pulses are intact x4 and equal.          Neurologic - Patient is awake, alert, and oriented x3.  Cranial nerves II through XII are grossly intact.  Motor and sensory functions grossly intact.  Cerebellar function was normal.  Integument - There are no rashes.  There are no petechia or purpura lesions noted.  There are no vesicular lesions noted.             Procedures:  Procedures      Medical Decision Making:      Comorbidities that affect care:    History of recent cellulitis, diabetes, hypertension, COPD, gout    External Notes reviewed:    Hospital Discharge Summary: Hospital discharge summary date 12/1/2023 by Dr. Leiva was reviewed by me.      The following orders were placed and all results were independently analyzed by me:  Orders Placed This Encounter   Procedures    Blood Culture - Blood,    Blood Culture - Blood,    CT Lower Extremity Left With Contrast    Comprehensive Metabolic Panel    Lactic Acid, Plasma    CBC Auto Differential    Cleanse and dress wounds of leg.  Nursing Communication    CBC & Differential       Medications Given in the Emergency Department:  Medications   mupirocin (BACTROBAN) 2 % ointment 1 application  (has no administration in time range)   iopamidol (ISOVUE-370) 76 % injection 100 mL (100 mL Intravenous Given 12/22/23 1337)   potassium chloride (MICRO-K/KLOR-CON) CR capsule (40 mEq Oral Given 12/22/23 1514)        ED  Course:     The patient was seen and evaluated in the ED by me.  The above history and physical examination was performed as documented.  Diagnostic data was obtained.  Results reviewed.  Discussed with the patient.  The patient's ED workup was for the most part pretty unremarkable.  There is no fever.  There is no white count.  CT scan shows no discrete fluid collection or abscess.  I really feel this may be more of a healing process but patient is very concerned patient with him being a diabetic so I will empirically cover him with broad-spectrum antibiotics and some pain medication.  I have given him a my work schedule for the rest of this week through next Friday and that if his symptoms were to change or worsen to come back since I already know his story and his history.  Patient was agreeable to this.    Labs:    Lab Results (last 24 hours)       Procedure Component Value Units Date/Time    CBC & Differential [[id number]]  (Abnormal) Collected: 12/22/23 1256    Specimen: Blood Updated: 12/22/23 1314    Narrative:      The following orders were created for panel order CBC & Differential.  Procedure                               Abnormality         Status                     ---------                               -----------         ------                     CBC Auto Differential[[id number]]        Abnormal            Final result                 Please view results for these tests on the individual orders.    Comprehensive Metabolic Panel [[id number]]  (Abnormal) Collected: 12/22/23 1256    Specimen: Blood Updated: 12/22/23 1332     Glucose 111 mg/dL      BUN 19 mg/dL      Creatinine 1.22 mg/dL      Sodium 139 mmol/L      Potassium 3.4 mmol/L      Chloride 101 mmol/L      CO2 24.8 mmol/L      Calcium 8.7 mg/dL      Total Protein 8.6 g/dL      Albumin 3.7 g/dL      ALT (SGPT) 15 U/L      AST (SGOT) 26 U/L      Alkaline Phosphatase 85 U/L      Total Bilirubin 0.3 mg/dL      Globulin 4.9 gm/dL      A/G Ratio 0.8  g/dL      BUN/Creatinine Ratio 15.6     Anion Gap 13.2 mmol/L      eGFR 65.0 mL/min/1.73     Narrative:      GFR Normal >60  Chronic Kidney Disease <60  Kidney Failure <15      Lactic Acid, Plasma [[id number]]  (Normal) Collected: 12/22/23 1256    Specimen: Blood Updated: 12/22/23 1328     Lactate 0.9 mmol/L     Blood Culture - Blood, Arm, Left [[id number]] Collected: 12/22/23 1256    Specimen: Blood from Arm, Left Updated: 12/22/23 1309    Blood Culture - Blood, Arm, Right [[id number]] Collected: 12/22/23 1256    Specimen: Blood from Arm, Right Updated: 12/22/23 1310    CBC Auto Differential [[id number]]  (Abnormal) Collected: 12/22/23 1256    Specimen: Blood Updated: 12/22/23 1314     WBC 9.57 10*3/mm3      RBC 3.85 10*6/mm3      Hemoglobin 11.3 g/dL      Hematocrit 34.1 %      MCV 88.6 fL      MCH 29.4 pg      MCHC 33.1 g/dL      RDW 13.8 %      RDW-SD 44.4 fl      MPV 9.9 fL      Platelets 210 10*3/mm3      Neutrophil % 50.8 %      Lymphocyte % 35.7 %      Monocyte % 9.0 %      Eosinophil % 3.3 %      Basophil % 0.6 %      Immature Grans % 0.6 %      Neutrophils, Absolute 4.85 10*3/mm3      Lymphocytes, Absolute 3.42 10*3/mm3      Monocytes, Absolute 0.86 10*3/mm3      Eosinophils, Absolute 0.32 10*3/mm3      Basophils, Absolute 0.06 10*3/mm3      Immature Grans, Absolute 0.06 10*3/mm3      nRBC 0.0 /100 WBC              Imaging:    CT Lower Extremity Left With Contrast    Result Date: 12/22/2023  PROCEDURE: CT LOWER EXTREMITY LEFT W CONTRAST  COMPARISON: [facility], CT, CT LOWER EXTREMITY LEFT WO CONTRAST, 11/25/2023, 14:32.  INDICATIONS: Infection involving left lower leg  PROTOCOL:   Standard imaging protocol performed    RADIATION:   DLP: 1147.8 mGy*cm   Automated exposure control was utilized to minimize radiation dose.  TECHNIQUE: After obtaining the patient's consent, multi-planar CT images of the extremity were created with non-ionic intravenous contrast.   FINDINGS:  Residual diffuse  subcutaneous soft tissue edema is observed throughout the subcutaneous soft tissues circumferentially surrounding the left lower leg.  These findings are nonspecific but may indicate residual or continued changes of diffuse soft tissue cellulitis.  The edema has improved.  No well-defined focal fluid collection is seen to suggest abscess.  There is no gas production throughout the soft tissues.  There are no signs of necrosis.  There is no evidence for significant edema within the deep underlying muscular soft tissues.  The intermuscular fat planes are maintained.  There is no abnormal fluid collection with the deep muscular soft tissues.  No significant fluid or enhancement is seen along the fascia.  No cortical erosion or destruction is seen.  There is no evidence for periostitis.  No definitive signs of osteomyelitis are identified.  There is no fracture or malalignment.        1. Residual diffuse subcutaneous soft tissue edema is seen throughout the lower leg.  The findings have mildly improved.  The findings may indicate ongoing changes of soft tissue cellulitis. 2. No evidence for underlying deep muscular soft tissue involvement.  No well-defined abscess is observed.  There is no gas production throughout the soft tissues. 3. No evidence for osteomyelitis.     STEVEN DALTON MD       Electronically Signed and Approved By: STEVEN DALTON MD on 12/22/2023 at 14:50                Differential Diagnosis and Discussion:    Rash: Differential diagnosis includes but is not limited to sepsis, cellulitis, [place] Spotted Fever, meningitis, meningococcemia, Varicella, Strep infection, dermatitis, allergic reaction, Lyme disease, and toxic shock syndrome.    All labs were reviewed and interpreted by me.  CT scan radiology impression was interpreted by me.    MDM     Amount and/or Complexity of Data Reviewed  Clinical lab tests: reviewed  Tests in the radiology section of CPT®: reviewed             Patient Care  Considerations:    SEPSIS was considered but is NOT present in the emergency department as SIRS criteria is not present.      Consultants/Shared Management Plan:    None    Social Determinants of Health:    Patient is independent, reliable, and has access to care.       Disposition and Care Coordination:    Discharged: I considered escalation of care by admitting this patient for observation, however the patient has improved and is suitable and  stable for discharge.    I have explained the patient´s condition, diagnoses and treatment plan based on the information available to me at this time. I have answered questions and addressed any concerns. The patient has a good  understanding of the patient´s diagnosis, condition, and treatment plan as can be expected at this point. The vital signs have been stable. The patient´s condition is stable and appropriate for discharge from the emergency department.      The patient will pursue further outpatient evaluation with the primary care physician or other designated or consulting physician as outlined in the discharge instructions. They are agreeable to this plan of care and follow-up instructions have been explained in detail. The patient has received these instructions in written format and have expressed an understanding of the discharge instructions. The patient is aware that any significant change in condition or worsening of symptoms should prompt an immediate return to this or the closest emergency department or call to 911.  I have explained discharge medications and the need for follow up with the patient/caretakers. This was also printed in the discharge instructions. Patient was discharged with the following medications and follow up:      Medication List        New Prescriptions      amoxicillin-clavulanate 875-125 MG per tablet  Commonly known as: AUGMENTIN  Take 1 tablet by mouth Every 12 (Twelve) Hours.     HYDROcodone-acetaminophen 7.5-325 MG per  tablet  Commonly known as: NORCO  Take 1 tablet by mouth Every 6 (Six) Hours As Needed for Moderate Pain.     sulfamethoxazole-trimethoprim 800-160 MG per tablet  Commonly known as: BACTRIM DS,SEPTRA DS  Take 1 tablet by mouth 2 (Two) Times a Day.               Where to Get Your Medications        These medications were sent to [facility] - [place] - [address] - [phone]  - [phone]   [address] [place] [address]      Phone: [phone]   amoxicillin-clavulanate 875-125 MG per tablet  HYDROcodone-acetaminophen 7.5-325 MG per tablet  sulfamethoxazole-trimethoprim 800-160 MG per tablet      Nic Anderson,   [address] [facility] [address] [phone]    In 1 week  For wound re-check      Final diagnoses:   Cellulitis of left lower extremity        ED Disposition       ED Disposition   Discharge    Condition   Stable    Comment   --               This medical record created using voice recognition software.             Uziel Mann DO  12/23/23 8983

## 2023-12-24 LAB
BACTERIA SPEC AEROBE CULT: NORMAL
BACTERIA SPEC AEROBE CULT: NORMAL

## 2023-12-27 LAB
BACTERIA SPEC AEROBE CULT: NORMAL
BACTERIA SPEC AEROBE CULT: NORMAL

## 2025-07-20 ENCOUNTER — HOSPITAL ENCOUNTER (EMERGENCY)
Facility: HOSPITAL | Age: 70
Discharge: HOME | End: 2025-07-20
Attending: EMERGENCY MEDICINE | Admitting: EMERGENCY MEDICINE
Payer: OTHER GOVERNMENT

## 2025-07-20 ENCOUNTER — APPOINTMENT (OUTPATIENT)
Dept: GENERAL RADIOLOGY | Facility: HOSPITAL | Age: 70
End: 2025-07-20
Payer: OTHER GOVERNMENT

## 2025-07-20 VITALS
RESPIRATION RATE: 18 BRPM | SYSTOLIC BLOOD PRESSURE: 142 MMHG | WEIGHT: 292.99 LBS | BODY MASS INDEX: 43.4 KG/M2 | HEART RATE: 77 BPM | DIASTOLIC BLOOD PRESSURE: 80 MMHG | OXYGEN SATURATION: 99 % | HEIGHT: 69 IN | TEMPERATURE: 98.2 F

## 2025-07-20 DIAGNOSIS — R60.0 PEDAL EDEMA: Primary | ICD-10-CM

## 2025-07-20 DIAGNOSIS — B37.49 CANDIDIASIS OF GENITALIA: ICD-10-CM

## 2025-07-20 LAB
ALBUMIN SERPL-MCNC: 3.7 G/DL (ref 3.5–5.2)
ALBUMIN/GLOB SERPL: 0.9 G/DL
ALP SERPL-CCNC: 73 U/L (ref 39–117)
ALT SERPL W P-5'-P-CCNC: 16 U/L (ref 1–41)
ANION GAP SERPL CALCULATED.3IONS-SCNC: 11.3 MMOL/L (ref 5–15)
AST SERPL-CCNC: 19 U/L (ref 1–40)
BASOPHILS # BLD AUTO: 0.05 10*3/MM3 (ref 0–0.2)
BASOPHILS NFR BLD AUTO: 0.6 % (ref 0–1.5)
BILIRUB SERPL-MCNC: 0.4 MG/DL (ref 0–1.2)
BILIRUB UR QL STRIP: NEGATIVE
BUN SERPL-MCNC: 17.7 MG/DL (ref 8–23)
BUN/CREAT SERPL: 16.5 (ref 7–25)
CALCIUM SPEC-SCNC: 8.8 MG/DL (ref 8.6–10.5)
CHLORIDE SERPL-SCNC: 101 MMOL/L (ref 98–107)
CLARITY UR: CLEAR
CO2 SERPL-SCNC: 25.7 MMOL/L (ref 22–29)
COLOR UR: YELLOW
CREAT SERPL-MCNC: 1.07 MG/DL (ref 0.76–1.27)
DEPRECATED RDW RBC AUTO: 44.6 FL (ref 37–54)
EGFRCR SERPLBLD CKD-EPI 2021: 75.1 ML/MIN/1.73
EOSINOPHIL # BLD AUTO: 0.54 10*3/MM3 (ref 0–0.4)
EOSINOPHIL NFR BLD AUTO: 6.4 % (ref 0.3–6.2)
ERYTHROCYTE [DISTWIDTH] IN BLOOD BY AUTOMATED COUNT: 13 % (ref 12.3–15.4)
GLOBULIN UR ELPH-MCNC: 3.9 GM/DL
GLUCOSE SERPL-MCNC: 92 MG/DL (ref 65–99)
GLUCOSE UR STRIP-MCNC: NEGATIVE MG/DL
HCT VFR BLD AUTO: 36.3 % (ref 37.5–51)
HGB BLD-MCNC: 12 G/DL (ref 13–17.7)
HGB UR QL STRIP.AUTO: NEGATIVE
HOLD SPECIMEN: NORMAL
IMM GRANULOCYTES # BLD AUTO: 0.04 10*3/MM3 (ref 0–0.05)
IMM GRANULOCYTES NFR BLD AUTO: 0.5 % (ref 0–0.5)
INR PPP: 1.02 (ref 0.86–1.15)
KETONES UR QL STRIP: NEGATIVE
LEUKOCYTE ESTERASE UR QL STRIP.AUTO: NEGATIVE
LYMPHOCYTES # BLD AUTO: 3.17 10*3/MM3 (ref 0.7–3.1)
LYMPHOCYTES NFR BLD AUTO: 37.4 % (ref 19.6–45.3)
MCH RBC QN AUTO: 30.9 PG (ref 26.6–33)
MCHC RBC AUTO-ENTMCNC: 33.1 G/DL (ref 31.5–35.7)
MCV RBC AUTO: 93.6 FL (ref 79–97)
MONOCYTES # BLD AUTO: 0.87 10*3/MM3 (ref 0.1–0.9)
MONOCYTES NFR BLD AUTO: 10.3 % (ref 5–12)
NEUTROPHILS NFR BLD AUTO: 3.81 10*3/MM3 (ref 1.7–7)
NEUTROPHILS NFR BLD AUTO: 44.8 % (ref 42.7–76)
NITRITE UR QL STRIP: NEGATIVE
NRBC BLD AUTO-RTO: 0 /100 WBC (ref 0–0.2)
NT-PROBNP SERPL-MCNC: 325.7 PG/ML (ref 0–900)
PH UR STRIP.AUTO: <=5 [PH] (ref 5–8)
PLATELET # BLD AUTO: 226 10*3/MM3 (ref 140–450)
PMV BLD AUTO: 9.6 FL (ref 6–12)
POTASSIUM SERPL-SCNC: 3.6 MMOL/L (ref 3.5–5.2)
PROT SERPL-MCNC: 7.6 G/DL (ref 6–8.5)
PROT UR QL STRIP: NEGATIVE
PROTHROMBIN TIME: 13.9 SECONDS (ref 11.8–14.9)
QT INTERVAL: 440 MS
QTC INTERVAL: 511 MS
RBC # BLD AUTO: 3.88 10*6/MM3 (ref 4.14–5.8)
SODIUM SERPL-SCNC: 138 MMOL/L (ref 136–145)
SP GR UR STRIP: 1.01 (ref 1–1.03)
UROBILINOGEN UR QL STRIP: NORMAL
WBC NRBC COR # BLD AUTO: 8.48 10*3/MM3 (ref 3.4–10.8)

## 2025-07-20 PROCEDURE — 80053 COMPREHEN METABOLIC PANEL: CPT | Performed by: EMERGENCY MEDICINE

## 2025-07-20 PROCEDURE — 85610 PROTHROMBIN TIME: CPT | Performed by: EMERGENCY MEDICINE

## 2025-07-20 PROCEDURE — 93005 ELECTROCARDIOGRAM TRACING: CPT | Performed by: EMERGENCY MEDICINE

## 2025-07-20 PROCEDURE — 99284 EMERGENCY DEPT VISIT MOD MDM: CPT

## 2025-07-20 PROCEDURE — 85025 COMPLETE CBC W/AUTO DIFF WBC: CPT | Performed by: EMERGENCY MEDICINE

## 2025-07-20 PROCEDURE — 71045 X-RAY EXAM CHEST 1 VIEW: CPT

## 2025-07-20 PROCEDURE — 83880 ASSAY OF NATRIURETIC PEPTIDE: CPT | Performed by: EMERGENCY MEDICINE

## 2025-07-20 PROCEDURE — 81003 URINALYSIS AUTO W/O SCOPE: CPT | Performed by: EMERGENCY MEDICINE

## 2025-07-20 RX ORDER — FLUCONAZOLE 150 MG/1
TABLET ORAL
Qty: 5 TABLET | Refills: 0 | Status: SHIPPED | OUTPATIENT
Start: 2025-07-20

## 2025-07-20 RX ORDER — PREDNISONE 20 MG/1
TABLET ORAL
Qty: 10 TABLET | Refills: 0 | Status: SHIPPED | OUTPATIENT
Start: 2025-07-20

## 2025-07-20 RX ORDER — CLOTRIMAZOLE AND BETAMETHASONE DIPROPIONATE 10; .64 MG/G; MG/G
1 CREAM TOPICAL 2 TIMES DAILY
Qty: 45 G | Refills: 0 | Status: SHIPPED | OUTPATIENT
Start: 2025-07-20

## 2025-07-20 NOTE — DISCHARGE INSTRUCTIONS
Increase your Lasix to 40 mg in the morning for the next 5 days.  Wash the genital area with soapy water and dry well.  Apply Lotrisone ointment twice daily.  Take prednisone for the next 5 days.  Please be sure to check your blood sugars closely.  Take Diflucan which is a strong antifungal, 1 time weekly for the next 5 weeks

## 2025-07-20 NOTE — ED PROVIDER NOTES
"Time: 5:25 PM EDT  Date of encounter:  7/20/2025  Independent Historian/Clinical History and Information was obtained by:   Patient    History is limited by: N/A    Chief Complaint: Irritating rash to scrotum, swelling in legs      History of Present Illness:  Patient is a 70 y.o. year old male who presents to the emergency department for evaluation of irritating rash to scrotum and swelling in legs.  This patient presents to the emergency department with the above symptoms for the last several weeks.  He has tried various different topical agents which have not been helpful.  The patient has had no fever chills cough and he denies any chest pain or shortness of breath.  He has had no vomiting or diarrhea.  He does have chronic leg swelling which seems to be worse over the last several days.  His scrotum seems maybe a little bit edematous but he has no pain in the testicles proper.  He has no dysuria or other urinary complaints.      Patient Care Team  Primary Care Provider: Nic Anderson DO    Past Medical History:     Allergies   Allergen Reactions    Morphine Shortness Of Breath     \"stops breathing\"     Past Medical History:   Diagnosis Date    Callus     COPD (chronic obstructive pulmonary disease)     Diabetes mellitus     Gout     Hypertension      Past Surgical History:   Procedure Laterality Date    CARPAL TUNNEL RELEASE      EYE SURGERY      JOINT REPLACEMENT       Family History   Problem Relation Age of Onset    Cancer Father     Diabetes Father     Heart disease Neg Hx     Hypertension Neg Hx     Thyroid disease Neg Hx        Home Medications:  Prior to Admission medications    Medication Sig Start Date End Date Taking? Authorizing Provider   allopurinol (ZYLOPRIM) 100 MG tablet Take 1 tablet by mouth Daily.    ProviderMona MD   atorvastatin (LIPITOR) 80 MG tablet Take 1 tablet by mouth Daily.    ProviderMona MD   benzonatate (TESSALON) 100 MG capsule Take 1 capsule by mouth 3 " (Three) Times a Day As Needed for Cough. 12/26/24   Nirav Mcfarlane DO   Continuous Glucose  (Dexcom G7 ) device Use 1 each Every 10 (Ten) Days.    Mona Barron MD   Cyanocobalamin (VITAMIN B 12 PO) Take 500 mcg by mouth Daily.    Mona Barron MD   empagliflozin (JARDIANCE) 25 MG tablet tablet Take 1 tablet by mouth Daily.    Mona Barron MD   furosemide (LASIX) 20 MG tablet Take 1 tablet by mouth Daily.    Mona Barron MD   HYDROcodone-acetaminophen (NORCO) 7.5-325 MG per tablet Take 1 tablet by mouth Every 6 (Six) Hours As Needed for Moderate Pain. 12/22/23   Uziel Mann DO   ipratropium (ATROVENT) 0.03 % nasal spray Administer 2 sprays into the nostril(s) as directed by provider 3 (Three) Times a Day. 12/26/24   Nirav Mcfarlane DO   levothyroxine (SYNTHROID, LEVOTHROID) 175 MCG tablet Take 1 tablet by mouth Daily.    Mona Barron MD   losartan (COZAAR) 50 MG tablet Take 1 tablet by mouth Daily.    Mona Barron MD   metFORMIN (GLUCOPHAGE) 1000 MG tablet Take 1 tablet by mouth 2 (Two) Times a Day With Meals.    Mona Barron MD   naloxone (NARCAN) 4 MG/0.1ML nasal spray Call 911. Don't prime. [place] in 1 nostril for overdose. Repeat in 2-3 minutes in other nostril if no or minimal breathing/responsiveness. 12/1/23   Wyatt Leiva MD   omeprazole (priLOSEC) 20 MG capsule Take 1 capsule by mouth Daily.    Mona Barron MD   POTASSIUM CHLORIDE ER PO Take 1 tablet by mouth Daily.    Mona Barron MD   Semaglutide,0.25 or 0.5MG/DOS, (Ozempic, 0.25 or 0.5 MG/DOSE,) 2 MG/1.5ML solution pen-injector Inject 0.5 mg under the skin into the appropriate area as directed 1 (One) Time Per Week. Takes once a week.    Mona Barron MD        Social History:   Social History     Tobacco Use    Smoking status: Former     Current packs/day: 0.00     Average packs/day: 1 pack/day for 40.0 years (40.0 ttl pk-yrs)     Types:  "Cigarettes, Cigars     Start date: 1972     Quit date: 2012     Years since quittin.5    Tobacco comments:     Never should have started   Vaping Use    Vaping status: Never Used   Substance Use Topics    Alcohol use: Yes     Alcohol/week: 3.0 standard drinks of alcohol     Types: 3 Shots of liquor per week     Comment: social    Drug use: Yes     Frequency: 2.0 times per week     Types: Marijuana         Review of Systems:  Review of Systems   Constitutional:  Negative for chills and fever.   HENT:  Negative for congestion, ear pain and sore throat.    Eyes:  Negative for pain.   Respiratory:  Negative for cough, chest tightness and shortness of breath.    Cardiovascular:  Positive for leg swelling. Negative for chest pain.   Gastrointestinal:  Negative for abdominal pain, diarrhea, nausea and vomiting.   Genitourinary:  Negative for flank pain and hematuria.   Musculoskeletal:  Negative for joint swelling.   Skin:  Positive for rash. Negative for pallor.   Neurological:  Negative for seizures and headaches.   All other systems reviewed and are negative.       Physical Exam:  /66   Pulse 74   Temp 98.2 °F (36.8 °C)   Resp 20   Ht 175.3 cm (69\")   Wt 133 kg (292 lb 15.9 oz)   SpO2 99%   BMI 43.27 kg/m²     Physical Exam  Vitals and nursing note reviewed.   Constitutional:       General: He is not in acute distress.     Appearance: Normal appearance. He is not toxic-appearing.   HENT:      Head: Normocephalic and atraumatic.      Mouth/Throat:      Mouth: Mucous membranes are moist.   Eyes:      General: No scleral icterus.  Cardiovascular:      Rate and Rhythm: Normal rate and regular rhythm.      Pulses: Normal pulses.      Heart sounds: Normal heart sounds.   Pulmonary:      Effort: Pulmonary effort is normal. No respiratory distress.      Breath sounds: Normal breath sounds.   Abdominal:      General: Abdomen is flat.      Palpations: Abdomen is soft.      Tenderness: There is no " abdominal tenderness.   Genitourinary:     Comments: The patient has a confluent erythematous rash to the scrotum, perineal and pubic areas which has peripheral satellite lesions.  There is no purulent drainage, there is no sign of Terra's gangrene, there are no lymphangitic streaks.  Musculoskeletal:         General: Normal range of motion.      Cervical back: Normal range of motion and neck supple.      Right lower leg: Edema present.      Left lower leg: Edema present.   Skin:     General: Skin is warm and dry.      Capillary Refill: Capillary refill takes less than 2 seconds.   Neurological:      General: No focal deficit present.      Mental Status: He is alert and oriented to person, place, and time. Mental status is at baseline.                    Medical Decision Making:      Comorbidities that affect care:    COPD, Diabetes, Hypertension    External Notes reviewed:    Previous Clinic Note: Office visit for URI in December 2020 for      The following orders were placed and all results were independently analyzed by me:  Orders Placed This Encounter   Procedures    XR Chest 1 View    Comprehensive Metabolic Panel    Protime-INR    BNP    Urinalysis With Culture If Indicated - Urine, Clean Catch    CBC Auto Differential    ECG 12 Lead Rhythm Change    CBC & Differential    Extra Tubes    Gold Top - SST       Medications Given in the Emergency Department:  Medications - No data to display     ED Course:         EKG: Sinus rhythm with a rate of 71 bpm   no acute ischemic changes are noted    Labs:    Lab Results (last 24 hours)       Procedure Component Value Units Date/Time    Urinalysis With Culture If Indicated - Urine, Clean Catch [[id number]]  (Normal) Collected: 07/20/25 1733    Specimen: Urine, Clean Catch Updated: 07/20/25 1745     Color, UA Yellow     Appearance, UA Clear     pH, UA <=5.0     Specific Gravity, UA 1.011     Glucose, UA Negative     Ketones, UA Negative     Bilirubin, UA Negative      Blood, UA Negative     Protein, UA Negative     Leuk Esterase, UA Negative     Nitrite, UA Negative     Urobilinogen, UA 0.2 E.U./dL    Narrative:      In absence of clinical symptoms, the presence of pyuria, bacteria, and/or nitrites on the urinalysis result does not correlate with infection.  Urine microscopic not indicated.    CBC & Differential [[id number]]  (Abnormal) Collected: 07/20/25 1737    Specimen: Blood Updated: 07/20/25 1744    Narrative:      The following orders were created for panel order CBC & Differential.  Procedure                               Abnormality         Status                     ---------                               -----------         ------                     CBC Auto Differential[[id number]]        Abnormal            Final result                 Please view results for these tests on the individual orders.    Comprehensive Metabolic Panel [[id number]] Collected: 07/20/25 1737    Specimen: Blood Updated: 07/20/25 1812     Glucose 92 mg/dL      BUN 17.7 mg/dL      Creatinine 1.07 mg/dL      Sodium 138 mmol/L      Potassium 3.6 mmol/L      Chloride 101 mmol/L      CO2 25.7 mmol/L      Calcium 8.8 mg/dL      Total Protein 7.6 g/dL      Albumin 3.7 g/dL      ALT (SGPT) 16 U/L      AST (SGOT) 19 U/L      Alkaline Phosphatase 73 U/L      Total Bilirubin 0.4 mg/dL      Globulin 3.9 gm/dL      A/G Ratio 0.9 g/dL      BUN/Creatinine Ratio 16.5     Anion Gap 11.3 mmol/L      eGFR 75.1 mL/min/1.73     Narrative:      GFR Categories in Chronic Kidney Disease (CKD)              GFR Category          GFR (mL/min/1.73)    Interpretation  G1                    90 or greater        Normal or high (1)  G2                    60-89                Mild decrease (1)  G3a                   45-59                Mild to moderate decrease  G3b                   30-44                Moderate to severe decrease  G4                    15-29                Severe decrease  G5                    14 or less            Kidney failure    (1)In the absence of evidence of kidney disease, neither GFR category G1 or G2 fulfill the criteria for CKD.    eGFR calculation 2021 CKD-EPI creatinine equation, which does not include race as a factor    Protime-INR [[id number]]  (Normal) Collected: 07/20/25 1737    Specimen: Blood Updated: 07/20/25 1753     Protime 13.9 Seconds      INR 1.02    Narrative:      Suggested Therapeutic Ranges For Oral Anticoagulant Therapy:  Level of Therapy                      INR Target Range  Standard Dose                            2.0-3.0  High Dose                                2.5-3.5  Patients not receiving anticoagulant  Therapy Normal Range                     0.86-1.15    BNP [[id number]]  (Normal) Collected: 07/20/25 1737    Specimen: Blood Updated: 07/20/25 1809     proBNP 325.7 pg/mL     Narrative:      This assay is used as an aid in the diagnosis of individuals suspected of having heart failure. It can be used as an aid in the diagnosis of acute decompensated heart failure (ADHF) in patients presenting with signs and symptoms of ADHF to the emergency department (ED). In addition, NT-proBNP of <300 pg/mL indicates ADHF is not likely.    Age Range Result Interpretation  NT-proBNP Concentration (pg/mL:      <50             Positive            >450                   Gray                 300-450                    Negative             <300    50-75           Positive            >900                  Gray                300-900                  Negative            <300      >75             Positive            >1800                  Gray                300-1800                  Negative            <300    CBC Auto Differential [[id number]]  (Abnormal) Collected: 07/20/25 1737    Specimen: Blood Updated: 07/20/25 1744     WBC 8.48 10*3/mm3      RBC 3.88 10*6/mm3      Hemoglobin 12.0 g/dL      Hematocrit 36.3 %      MCV 93.6 fL      MCH 30.9 pg      MCHC 33.1 g/dL      RDW 13.0 %      RDW-SD 44.6 fl       MPV 9.6 fL      Platelets 226 10*3/mm3      Neutrophil % 44.8 %      Lymphocyte % 37.4 %      Monocyte % 10.3 %      Eosinophil % 6.4 %      Basophil % 0.6 %      Immature Grans % 0.5 %      Neutrophils, Absolute 3.81 10*3/mm3      Lymphocytes, Absolute 3.17 10*3/mm3      Monocytes, Absolute 0.87 10*3/mm3      Eosinophils, Absolute 0.54 10*3/mm3      Basophils, Absolute 0.05 10*3/mm3      Immature Grans, Absolute 0.04 10*3/mm3      nRBC 0.0 /100 WBC              Imaging:    XR Chest 1 View  Result Date: 7/20/2025  XR CHEST 1 VW Date of Exam: 7/20/2025 5:40 PM EDT Indication: Shortness of breath Comparison: 7/9/2023 FINDINGS: The lungs are well-expanded. The heart and pulmonary vasculature are within normal limits. No pleural effusions are identified. There are no active appearing infiltrates. Stable elevation of the right hemidiaphragm.     No active disease. Electronically Signed: Trell Nguyen MD  7/20/2025 5:57 PM EDT  Workstation ID: [id number]        Differential Diagnosis and Discussion:    Rash: Differential diagnosis includes but is not limited to sepsis, cellulitis, [place] Spotted Fever, meningitis, meningococcemia, Varicella, Strep infection, dermatitis, allergic reaction, Lyme disease, and toxic shock syndrome.    PROCEDURES:    Labs were collected in the emergency department and all labs were reviewed and interpreted by me.  X-ray were performed in the emergency department and all X-ray impressions were independently interpreted by me.  An EKG was performed and the EKG was interpreted by me.    ECG 12 Lead Rhythm Change   Preliminary Result   HEART RATE=81  bpm   RR Edyvpfbe=421  ms   ID Bnlbdmcd=240  ms   P Horizontal Axis=8  deg   P Front Axis=29  deg   QRSD Nihdncuq=696  ms   QT Jhejsklo=842  ms   KPrG=788  ms   QRS Axis=45  deg   T Wave Axis=85  deg   - ABNORMAL ECG -   Sinus rhythm   Borderline T abnormalities, lateral leads   Prolonged QT interval   Date and Time of Study:2025-07-20 17:39:42           Procedures    MDM     Amount and/or Complexity of Data Reviewed  Clinical lab tests: reviewed  Tests in the radiology section of CPT®: reviewed  Tests in the medicine section of CPT®: reviewed                       Patient Care Considerations:    SEPSIS was considered but is NOT present in the emergency department as SIRS criteria is not present.      Consultants/Shared Management Plan:    None    Social Determinants of Health:    Patient has presented with family members who are responsible, reliable and will ensure follow up care.      Disposition and Care Coordination:    Discharged: The patient is suitable and stable for discharge with no need for consideration of admission.    I have explained discharge medications and the need for follow up with the patient/caretakers. This was also printed in the discharge instructions. Patient was discharged with the following medications and follow up:      Medication List        New Prescriptions      clotrimazole-betamethasone 1-0.05 % cream  Commonly known as: LOTRISONE  Apply 1 Application topically to the appropriate area as directed 2 (Two) Times a Day.     fluconazole 150 MG tablet  Commonly known as: DIFLUCAN  Take 1 p.o. weekly for 5 weeks     predniSONE 20 MG tablet  Commonly known as: DELTASONE  Take 2 p.o. daily for 5 days               Where to Get Your Medications        These medications were sent to [facility] - [place] - [address] - [phone]  - [phone] FX  [address], [place] [address]      Phone: [phone]   clotrimazole-betamethasone 1-0.05 % cream  fluconazole 150 MG tablet  predniSONE 20 MG tablet      Nic Anderson DO  [address] [facility] [address] [phone]    In 3 days  If no better.       Final diagnoses:   Pedal edema   Candidiasis of genitalia        ED Disposition       ED Disposition   Discharge    Condition   Stable    Comment   --               This medical record created  using voice recognition software.             Fausto Blandon,   07/20/25 1900       Fausto Blandon,   07/20/25 1900

## 2025-08-11 LAB
QT INTERVAL: 440 MS
QTC INTERVAL: 511 MS